# Patient Record
Sex: MALE | Race: WHITE | NOT HISPANIC OR LATINO | Employment: FULL TIME | URBAN - NONMETROPOLITAN AREA
[De-identification: names, ages, dates, MRNs, and addresses within clinical notes are randomized per-mention and may not be internally consistent; named-entity substitution may affect disease eponyms.]

---

## 2017-01-13 ENCOUNTER — TELEPHONE (OUTPATIENT)
Dept: MEDICAL GROUP | Facility: PHYSICIAN GROUP | Age: 57
End: 2017-01-13

## 2017-01-17 RX ORDER — GLIPIZIDE 5 MG/1
TABLET ORAL
Qty: 180 TAB | Refills: 3 | Status: SHIPPED | OUTPATIENT
Start: 2017-01-17 | End: 2017-11-07

## 2017-01-19 ENCOUNTER — APPOINTMENT (OUTPATIENT)
Dept: RADIOLOGY | Facility: IMAGING CENTER | Age: 57
End: 2017-01-19
Attending: ORTHOPAEDIC SURGERY
Payer: COMMERCIAL

## 2017-01-19 ENCOUNTER — NON-PROVIDER VISIT (OUTPATIENT)
Dept: URGENT CARE | Facility: PHYSICIAN GROUP | Age: 57
End: 2017-01-19
Payer: COMMERCIAL

## 2017-01-19 DIAGNOSIS — M54.5 LOW BACK PAIN, UNSPECIFIED BACK PAIN LATERALITY, UNSPECIFIED CHRONICITY, WITH SCIATICA PRESENCE UNSPECIFIED: ICD-10-CM

## 2017-01-19 DIAGNOSIS — M54.6 THORACIC BACK PAIN, UNSPECIFIED BACK PAIN LATERALITY, UNSPECIFIED CHRONICITY: ICD-10-CM

## 2017-01-19 DIAGNOSIS — M53.2X5: ICD-10-CM

## 2017-01-19 PROCEDURE — 72100 X-RAY EXAM L-S SPINE 2/3 VWS: CPT | Mod: TC | Performed by: PHYSICIAN ASSISTANT

## 2017-01-25 ENCOUNTER — HOSPITAL ENCOUNTER (OUTPATIENT)
Dept: LAB | Facility: MEDICAL CENTER | Age: 57
End: 2017-01-25
Attending: INTERNAL MEDICINE
Payer: COMMERCIAL

## 2017-01-25 DIAGNOSIS — Z12.5 SCREENING FOR PROSTATE CANCER: ICD-10-CM

## 2017-01-25 DIAGNOSIS — E66.9 DIABETES MELLITUS TYPE 2 IN OBESE (HCC): ICD-10-CM

## 2017-01-25 DIAGNOSIS — D64.9 ANEMIA OF UNKNOWN ETIOLOGY: ICD-10-CM

## 2017-01-25 DIAGNOSIS — E11.69 DIABETES MELLITUS TYPE 2 IN OBESE (HCC): ICD-10-CM

## 2017-01-25 LAB
ALBUMIN SERPL BCP-MCNC: 4 G/DL (ref 3.2–4.9)
ALBUMIN/GLOB SERPL: 1.4 G/DL
ALP SERPL-CCNC: 131 U/L (ref 30–99)
ALT SERPL-CCNC: 26 U/L (ref 2–50)
ANION GAP SERPL CALC-SCNC: 11 MMOL/L (ref 0–11.9)
AST SERPL-CCNC: 27 U/L (ref 12–45)
BASOPHILS # BLD AUTO: 0.04 K/UL (ref 0–0.12)
BASOPHILS NFR BLD AUTO: 0.6 % (ref 0–1.8)
BILIRUB SERPL-MCNC: 0.7 MG/DL (ref 0.1–1.5)
BUN SERPL-MCNC: 14 MG/DL (ref 8–22)
CALCIUM SERPL-MCNC: 9.2 MG/DL (ref 8.5–10.5)
CHLORIDE SERPL-SCNC: 102 MMOL/L (ref 96–112)
CO2 SERPL-SCNC: 25 MMOL/L (ref 20–33)
CREAT SERPL-MCNC: 1.07 MG/DL (ref 0.5–1.4)
EOSINOPHIL # BLD: 0.14 K/UL (ref 0–0.51)
EOSINOPHIL NFR BLD AUTO: 1.9 % (ref 0–6.9)
ERYTHROCYTE [DISTWIDTH] IN BLOOD BY AUTOMATED COUNT: 45.5 FL (ref 35.9–50)
EST. AVERAGE GLUCOSE BLD GHB EST-MCNC: 166 MG/DL
GLOBULIN SER CALC-MCNC: 2.9 G/DL (ref 1.9–3.5)
GLUCOSE SERPL-MCNC: 147 MG/DL (ref 65–99)
HBA1C MFR BLD: 7.4 % (ref 0–5.6)
HCT VFR BLD AUTO: 32.7 % (ref 42–52)
HGB BLD-MCNC: 10 G/DL (ref 14–18)
IMM GRANULOCYTES # BLD AUTO: 0.11 K/UL (ref 0–0.11)
IMM GRANULOCYTES NFR BLD AUTO: 1.5 % (ref 0–0.9)
LYMPHOCYTES # BLD: 1.21 K/UL (ref 1–4.8)
LYMPHOCYTES NFR BLD AUTO: 16.8 % (ref 22–41)
MCH RBC QN AUTO: 26.7 PG (ref 27–33)
MCHC RBC AUTO-ENTMCNC: 30.6 G/DL (ref 33.7–35.3)
MCV RBC AUTO: 87.2 FL (ref 81.4–97.8)
MONOCYTES # BLD: 0.42 K/UL (ref 0–0.85)
MONOCYTES NFR BLD AUTO: 5.8 % (ref 0–13.4)
NEUTROPHILS # BLD: 5.3 K/UL (ref 1.82–7.42)
NEUTROPHILS NFR BLD AUTO: 73.4 % (ref 44–72)
NRBC # BLD AUTO: 0 K/UL
NRBC BLD-RTO: 0 /100 WBC
PLATELET # BLD AUTO: 295 K/UL (ref 164–446)
PMV BLD AUTO: 9.2 FL (ref 9–12.9)
POTASSIUM SERPL-SCNC: 4.6 MMOL/L (ref 3.6–5.5)
PROT SERPL-MCNC: 6.9 G/DL (ref 6–8.2)
PSA SERPL DL<=0.01 NG/ML-MCNC: 0.77 NG/ML (ref 0–4)
RBC # BLD AUTO: 3.75 M/UL (ref 4.7–6.1)
SODIUM SERPL-SCNC: 138 MMOL/L (ref 135–145)
WBC # BLD AUTO: 7.2 K/UL (ref 4.8–10.8)

## 2017-01-25 PROCEDURE — 36415 COLL VENOUS BLD VENIPUNCTURE: CPT

## 2017-01-25 PROCEDURE — 80053 COMPREHEN METABOLIC PANEL: CPT

## 2017-01-25 PROCEDURE — 84153 ASSAY OF PSA TOTAL: CPT

## 2017-01-25 PROCEDURE — 83036 HEMOGLOBIN GLYCOSYLATED A1C: CPT

## 2017-01-25 PROCEDURE — 85025 COMPLETE CBC W/AUTO DIFF WBC: CPT

## 2017-01-31 ENCOUNTER — OFFICE VISIT (OUTPATIENT)
Dept: MEDICAL GROUP | Facility: PHYSICIAN GROUP | Age: 57
End: 2017-01-31
Payer: COMMERCIAL

## 2017-01-31 VITALS
HEIGHT: 72 IN | DIASTOLIC BLOOD PRESSURE: 84 MMHG | WEIGHT: 305 LBS | TEMPERATURE: 98.2 F | SYSTOLIC BLOOD PRESSURE: 132 MMHG | RESPIRATION RATE: 16 BRPM | BODY MASS INDEX: 41.31 KG/M2 | HEART RATE: 78 BPM | OXYGEN SATURATION: 95 %

## 2017-01-31 DIAGNOSIS — E11.69 DIABETES MELLITUS TYPE 2 IN OBESE (HCC): ICD-10-CM

## 2017-01-31 DIAGNOSIS — I10 ESSENTIAL HYPERTENSION: ICD-10-CM

## 2017-01-31 DIAGNOSIS — N40.1 BENIGN NODULAR PROSTATIC HYPERPLASIA WITH LOWER URINARY TRACT SYMPTOMS: ICD-10-CM

## 2017-01-31 DIAGNOSIS — Z79.891 CHRONIC USE OF OPIATE DRUGS THERAPEUTIC PURPOSES: ICD-10-CM

## 2017-01-31 DIAGNOSIS — D64.9 ANEMIA OF UNKNOWN ETIOLOGY: ICD-10-CM

## 2017-01-31 DIAGNOSIS — M51.37 DEGENERATION OF LUMBAR OR LUMBOSACRAL INTERVERTEBRAL DISC: ICD-10-CM

## 2017-01-31 DIAGNOSIS — M48.062 LUMBAR STENOSIS WITH NEUROGENIC CLAUDICATION: ICD-10-CM

## 2017-01-31 DIAGNOSIS — E66.9 DIABETES MELLITUS TYPE 2 IN OBESE (HCC): ICD-10-CM

## 2017-01-31 PROCEDURE — 99214 OFFICE O/P EST MOD 30 MIN: CPT | Performed by: INTERNAL MEDICINE

## 2017-01-31 RX ORDER — LANOLIN ALCOHOL/MO/W.PET/CERES
325 CREAM (GRAM) TOPICAL 2 TIMES DAILY WITH MEALS
Qty: 60 TAB | Refills: 11 | Status: SHIPPED | OUTPATIENT
Start: 2017-01-31 | End: 2017-05-18

## 2017-01-31 NOTE — MR AVS SNAPSHOT
"        Jamey Riojas   2017 1:40 PM   Office Visit   MRN: [id number]    Department:  [facility]   Dept Phone:  [phone]    Description:  Male : 1960   Provider:  Amira Vizcarra M.D.           Reason for Visit     Diabetes fv DM, labs      Allergies as of 2017     Allergen Noted Reactions    Penicillins 10/20/2009   Swelling    \"Tongue swells up\"    Phenergan [Promethazine Hcl] 10/15/2009       \"Jittery and hallucinating\"    Influenza Virus Vacc 2012       \"deathly ill\"      You were diagnosed with     Anemia of unknown etiology   [669133]       Lumbar stenosis with neurogenic claudication   [098194]       Chronic use of opiate drugs therapeutic purposes   [2152740]       Diabetes mellitus type 2 in obese (CMS-HCC)   [884413]       Essential hypertension   [9956017]       Degeneration of lumbar or lumbosacral intervertebral disc   [722.52.ICD-9-CM]   Uncontrolled, patient following with neurosurgery    Chronic use of opiate drugs therapeutic purposes   [5299860]   Uncontrolled, refill medication    Lumbar stenosis with neurogenic claudication   [776689]   Uncontrolled, patient to follow with Worker's Comp.      Vital Signs     Blood Pressure Pulse Temperature Respirations Height Weight    132/84 mmHg 78 36.8 °C (98.2 °F) 16 1.834 m (6' 0.2\") 138.347 kg (305 lb)    Body Mass Index Oxygen Saturation Smoking Status             41.13 kg/m2 95% Never Smoker          Basic Information     Date Of Birth Sex Race Ethnicity Preferred Language    1960 Male White Non- English      Problem List              ICD-10-CM Priority Class Noted - Resolved    Diabetes mellitus type 2 in obese (CMS-HCC) E11.9, E66.9   2011 - Present    HTN (hypertension) I10 Medium  2011 - Present    Insomnia G47.00   2011 - Present    GERD (gastroesophageal reflux disease) K21.9   2011 - Present    Chronic back pain M54.9, G89.29   2011 - Present    Vitamin D " deficiency disease E55.9 Low  1/12/2012 - Present    Sleep disorder breathing G47.30   8/30/2012 - Present    Chronic pain of left knee M25.562, G89.29   9/18/2012 - Present    Degeneration of cervical intervertebral disc M50.30 High  11/30/2012 - Present    Major depression F32.9 Low  12/2/2012 - Present    Obstructive sleep apnea G47.33 Medium  12/2/2012 - Present    Nausea & vomiting R11.2 High  12/2/2012 - Present    Iron deficiency anemia D50.9 Medium  12/2/2012 - Present    Degeneration of lumbar or lumbosacral intervertebral disc M51.37   12/30/2012 - Present    BPH (benign prostatic hyperplasia) N40.0   12/20/2013 - Present    Nephrolithiasis N20.0   7/30/2014 - Present    Dysuria R30.0   7/30/2014 - Present    Body aches R52   11/19/2014 - Present    Sinusitis J32.9   4/21/2015 - Present    Anemia of unknown etiology D64.9   5/21/2015 - Present    Rib pain on left side R07.81   10/22/2015 - Present    Lumbar stenosis with neurogenic claudication M48.06   12/31/2015 - Present    Chronic use of opiate drugs therapeutic purposes Z79.899   8/11/2016 - Present      Health Maintenance        Date Due Completion Dates    IMM HEP B VACCINE (1 of 3 - Primary Series) 1960 ---    RETINAL SCREENING 6/12/1980 ---    DIABETES MONOFILAMENT / LE EXAM 1/12/2013 1/12/2012 (N/S)    Override on 1/12/2012: (N/S)    IMM INFLUENZA (1) 9/1/2016 ---    COLONOSCOPY 1/1/2017 1/1/2012 (Done)    Override on 1/1/2012: Done (GI consult- polyps)    A1C SCREENING 7/25/2017 1/25/2017, 8/9/2016, 12/31/2015, 5/18/2015, 12/30/2013, 9/25/2013, 4/25/2013, 12/2/2012, 10/20/2012, 7/7/2012, 2/2/2012, 1/5/2012, 6/9/2010, 9/4/2007    FASTING LIPID PROFILE 8/9/2017 8/9/2016, 5/18/2015, 9/25/2013, 4/25/2013, 1/5/2012    URINE ACR / MICROALBUMIN 8/9/2017 8/9/2016, 5/19/2015, 2/2/2012    SERUM CREATININE 1/25/2018 1/25/2017, 8/9/2016, 12/31/2015, 12/11/2015, 5/18/2015, 12/30/2013, 9/25/2013, 4/25/2013, 1/3/2013, 1/2/2013, 12/5/2012, 12/3/2012,  12/2/2012, 11/27/2012, 10/20/2012, 9/10/2012, 7/7/2012, 4/25/2012, 4/23/2012, 2/2/2012, 1/5/2012, 11/15/2010, 6/9/2010, 5/10/2010, 10/15/2009, 9/10/2007, 9/4/2007    IMM DTaP/Tdap/Td Vaccine (2 - Td) 11/8/2025 11/8/2015            Current Immunizations     Pneumococcal polysaccharide vaccine (PPSV-23) 12/30/2005    Tdap Vaccine 11/8/2015      Below and/or attached are the medications your provider expects you to take. Review all of your home medications and newly ordered medications with your provider and/or pharmacist. Follow medication instructions as directed by your provider and/or pharmacist. Please keep your medication list with you and share with your provider. Update the information when medications are discontinued, doses are changed, or new medications (including over-the-counter products) are added; and carry medication information at all times in the event of emergency situations     Allergies:  PENICILLINS - Swelling     PHENERGAN - (reactions not documented)     INFLUENZA VIRUS VACC - (reactions not documented)               Medications  Valid as of: January 31, 2017 -  2:08 PM    Generic Name Brand Name Tablet Size Instructions for use    Albuterol Sulfate (Aero Soln) albuterol 108 (90 BASE) MCG/ACT Inhale 2 Puffs by mouth every 6 hours as needed for Shortness of Breath.        AmLODIPine Besylate (Tab) NORVASC 10 MG TAKE ONE TABLET BY MOUTH ONCE DAILY        Aspirin (Tab) aspirin 81 MG Take 81 mg by mouth every day.        Celecoxib (Cap) CELEBREX 200 MG TAKE ONE CAPSULE BY MOUTH TWICE DAILY        Cholecalciferol (Cap) Vitamin D 2000 UNITS Take  by mouth every day.        DULoxetine HCl (Cap DR Particles) CYMBALTA 30 MG Take 1 Cap by mouth every day.        Ferrous Sulfate (Tablet Delayed Response) ferrous sulfate 325 (65 FE) MG Take 1 Tab by mouth 2 times a day, with meals.        Fexofenadine HCl   Take  by mouth as needed.        Finasteride (Tab) PROSCAR 5 MG Take 1 Tab by mouth every day.           Fluticasone Propionate (Suspension) FLONASE 50 MCG/ACT Spray 2 Sprays in nose every day. Each Nostril        Gabapentin (Cap) NEURONTIN 300 MG Take 2 Caps by mouth 3 times a day.        GlipiZIDE (Tab) GLUCOTROL 5 MG TAKE ONE TABLET BY MOUTH TWICE DAILY        Glucosamine-Chondroitin-MSM   Take 2 Tabs by mouth 2 Times a Day.        HydrOXYzine HCl (Tab) ATARAX 50 MG Take 1 Tab by mouth 2 times a day as needed (insomnia).        Lansoprazole (CAPSULE DELAYED RELEASE) PREVACID 30 MG Take 1 Cap by mouth every day.        Losartan Potassium (Tab) COZAAR 100 MG TAKE ONE TABLET BY MOUTH ONCE DAILY        MetFORMIN HCl (Tab) GLUCOPHAGE 1000 MG TAKE ONE TABLET BY MOUTH TWICE DAILY WITH MEALS        Misc. Devices (Misc) Misc. Devices  One touch ultra test strips. Patient has DM type 2 and tests twice per day        Misc. Devices (Misc) Misc. Devices  CPAP supplies; fax to [facility] [phone].        Multiple Vitamins-Minerals   Take 1 Tab by mouth every day.          Nateglinide (Tab) STARLIX 120 MG TAKE ONE TABLET BY MOUTH THREE TIMES DAILY BEFORE MEAL(S)        Nystatin (Ointment) MYCOSTATIN 772630 UNIT/GM Apply to decubitus ulcer 8 times per day in a thick layer        Omeprazole (CAPSULE DELAYED RELEASE) PRILOSEC 20 MG TAKE ONE CAPSULE BY MOUTH ONCE DAILY        Oxycodone-Acetaminophen (Tab) PERCOCET-10  MG Take 1-2 Tabs by mouth every 6 hours as needed for Moderate Pain or Severe Pain (back pain).        QUEtiapine Fumarate (Tab) SEROQUEL 25 MG Take 1-2 Tabs by mouth every bedtime.        Sertraline HCl (Tab) ZOLOFT 25 MG Take 1 Tab by mouth every day.        Tamsulosin HCl (Cap) FLOMAX 0.4 MG Take 2 Caps by mouth every day.        TiZANidine HCl (Cap) ZANAFLEX 2 MG TAKE ONE CAPSULE BY MOUTH TWICE DAILY        TiZANidine HCl (Tab) ZANAFLEX 4 MG Take 1 Tab by mouth every 6 hours as needed (back pain).        TraZODone HCl (Tab) DESYREL 100 MG Take 100-200mg at night for sleep if needed.        Zolpidem Tartrate  (Tab) AMBIEN 10 MG TAKE ONE TABLET BY MOUTH AT BEDTIME AS NEEDED FOR SLEEP        .                 Medicines prescribed today were sent to:     [facility] - [place], [place] - [address]    [address] [place] [address]    Phone: [phone] Fax: [phone]    Open 24 Hours?: No    [facility] [address] [place], [place] - [address] [facility]    [address] [address]    Phone: [phone] Fax: [phone]    Open 24 Hours?: No      Medication refill instructions:       If your prescription bottle indicates you have medication refills left, it is not necessary to call your provider’s office. Please contact your pharmacy and they will refill your medication.    If your prescription bottle indicates you do not have any refills left, you may request refills at any time through one of the following ways: The online [employer] system (except Urgent Care), by calling your provider’s office, or by asking your pharmacy to contact your provider’s office with a refill request. Medication refills are processed only during regular business hours and may not be available until the next business day. Your provider may request additional information or to have a follow-up visit with you prior to refilling your medication.   *Please Note: Medication refills are assigned a new Rx number when refilled electronically. Your pharmacy may indicate that no refills were authorized even though a new prescription for the same medication is available at the pharmacy. Please request the medicine by name with the pharmacy before contacting your provider for a refill.        Instructions    1. Continue on your medications.    2. Follow up with Dr. Alcaraz in 4 weeks.       Other Notes About Your Plan     Last UDS:  5/21/15  DR ALCARAZ  Contolled Substance agreement signed: 5/21/15  DR ALCARAZ           [employer] Access Code: [email or web address]  Expires:  2/25/2017 11:39 AM    [employer]  A secure, online tool to manage your health information     [employer]® is a secure, online tool that connects you to your personalized health information from the privacy of your home -- day or night - making it very easy for you to manage your healthcare. Once the activation process is completed, you can even access your medical information using the [employer] piotr, which is available for free in the Apple Piotr store or Google Play store.     [employer] provides the following levels of access (as shown below):   My Chart Features   [facility] Doctor [facility]  Specialists [facility]  Urgent  Care Non-[facility]  Primary Care  Doctor   Email your healthcare team securely and privately 24/7 X X X    Manage appointments: schedule your next appointment; view details of past/upcoming appointments X      Request prescription refills. X      View recent personal medical records, including lab and immunizations X X X X   View health record, including health history, allergies, medications X X X X   Read reports about your outpatient visits, procedures, consult and ER notes X X X X   See your discharge summary, which is a recap of your hospital and/or ER visit that includes your diagnosis, lab results, and care plan. X X       How to register for [employer]:  1. Go to  [email or web address].  2. Click on the Sign Up Now box, which takes you to the New Member Sign Up page. You will need to provide the following information:  a. Enter your [employer] Access Code exactly as it appears at the top of this page. (You will not need to use this code after you’ve completed the sign-up process. If you do not sign up before the expiration date, you must request a new code.)   b. Enter your date of birth.   c. Enter your home email address.   d. Click Submit, and follow the next screen’s instructions.  3. Create a [employer] ID. This will be your [employer] login ID and cannot be changed, so think of one  that is secure and easy to remember.  4. Create a [employer] password. You can change your password at any time.  5. Enter your Password Reset Question and Answer. This can be used at a later time if you forget your password.   6. Enter your e-mail address. This allows you to receive e-mail notifications when new information is available in [employer].  7. Click Sign Up. You can now view your health information.    For assistance activating your [employer] account, call [phone]

## 2017-02-02 NOTE — ASSESSMENT & PLAN NOTE
Patient had anemia since his surgery last year. He has had a colonoscopy in the past. He recently had a CBC rechecked which shows persistent anemia but this is just after his recent surgery.

## 2017-02-02 NOTE — PROGRESS NOTES
Chief Complaint   Patient presents with   • Diabetes     fv DM, labs       HISTORY OF PRESENT ILLNESS: Patient is a 56 y.o. male established patient who presents today to be seen for acute and chronic issues.    Lumbar stenosis with neurogenic claudication  Patient is a 56-year-old male who's had a long history of chronic back problems. He had surgery in January 2016 on his back by Dr. Chan for chronic issues. 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 He had the onset of excruciating back pain and right knee pain. This was a Worker's Comp. issue and so it took several months for him to be able to have an MRI and see his neurosurgeon. He just underwent surgery by Dr. Chan this month and is recovering. He notes his pain is better but he does continue on narcotics. From our practice has received up to 6 tablets of Percocet per day previously. Neurosurgery has taken over his pain medications currently. He is going to see his neurosurgeon tomorrow. He is using anywhere from 6-8 Percocet per day. I've asked him to continue to follow with his neurosurgeon for pain meds for right now. I will see him back in a month and then take over his narcotics at that time I have told him he cannot write him for more than 6 tabs of Percocet per day. Patient's postoperative course was complicated by wound infection. He was treated with antibiotics and has improved.    BPH (benign prostatic hyperplasia)  At his last visit, patient describes symptoms of BPH. He was started on Flomax and had a PSA which is normal. Symptoms are much improved on the medication.    Anemia of unknown etiology  Patient had anemia since his surgery last year. He has had a colonoscopy in the past. He recently had a CBC rechecked which shows persistent anemia but this is just after his recent surgery.    Diabetes mellitus type 2 in obese  Patient has type 2 diabetes and continues on glipizide, Starlix and metformin.  Recent hemoglobin A1c is 7.4%. This is actually not terrible given the fact that the patient's undergone back surgery and had a postoperative infection. We discussed continuing his medications for now and will recheck hemoglobin A1c in 3 months. He is on losartan and statin.      Patient Active Problem List    Diagnosis Date Noted   • Nausea & vomiting 12/02/2012     Priority: High   • Degeneration of cervical intervertebral disc 11/30/2012     Priority: High   • Obstructive sleep apnea 12/02/2012     Priority: Medium   • Iron deficiency anemia 12/02/2012     Priority: Medium   • HTN (hypertension) 12/21/2011     Priority: Medium   • Major depression 12/02/2012     Priority: Low   • Vitamin D deficiency disease 01/12/2012     Priority: Low   • Chronic use of opiate drugs therapeutic purposes 08/11/2016   • Lumbar stenosis with neurogenic claudication 12/31/2015   • Rib pain on left side 10/22/2015   • Anemia of unknown etiology 05/21/2015   • Sinusitis 04/21/2015   • Body aches 11/19/2014   • Nephrolithiasis 07/30/2014   • Dysuria 07/30/2014   • BPH (benign prostatic hyperplasia) 12/20/2013   • Degeneration of lumbar or lumbosacral intervertebral disc 12/30/2012   • Chronic pain of left knee 09/18/2012   • Sleep disorder breathing 08/30/2012   • Diabetes mellitus type 2 in obese (CMS-[facility]) 12/21/2011   • Insomnia 12/21/2011   • GERD (gastroesophageal reflux disease) 12/21/2011   • Chronic back pain 12/21/2011       Allergies:Penicillins; Phenergan; and Influenza virus vacc    Current Outpatient Prescriptions Ordered in [facility]   Medication Sig Dispense Refill   • ferrous sulfate 325 (65 FE) MG EC tablet Take 1 Tab by mouth 2 times a day, with meals. 60 Tab 11   • glipiZIDE (GLUCOTROL) 5 MG Tab TAKE ONE TABLET BY MOUTH TWICE DAILY 180 Tab 3   • finasteride (PROSCAR) 5 MG Tab Take 1 Tab by mouth every day. 90 Tab 3   • oxycodone-acetaminophen (PERCOCET-10)  MG Tab Take 1-2 Tabs by mouth every 6 hours as needed for  Moderate Pain or Severe Pain (back pain). 180 Tab 0   • trazodone (DESYREL) 100 MG Tab Take 100-200mg at night for sleep if needed. 180 Tab 3   • celecoxib (CELEBREX) 200 MG Cap TAKE ONE CAPSULE BY MOUTH TWICE DAILY 180 Cap 3   • tamsulosin (FLOMAX) 0.4 MG capsule Take 2 Caps by mouth every day. 180 Cap 3   • nateglinide (STARLIX) 120 MG Tab TAKE ONE TABLET BY MOUTH THREE TIMES DAILY BEFORE MEAL(S) 270 Tab 3   • sertraline (ZOLOFT) 25 MG tablet Take 1 Tab by mouth every day. 90 Tab 3   • tizanidine (ZANAFLEX) 4 MG Tab Take 1 Tab by mouth every 6 hours as needed (back pain). 90 Tab 3   • amlodipine (NORVASC) 10 MG Tab TAKE ONE TABLET BY MOUTH ONCE DAILY 90 Tab 3   • losartan (COZAAR) 100 MG Tab TAKE ONE TABLET BY MOUTH ONCE DAILY 90 Tab 3   • metformin (GLUCOPHAGE) 1000 MG tablet TAKE ONE TABLET BY MOUTH TWICE DAILY WITH MEALS 180 Tab 3   • gabapentin (NEURONTIN) 300 MG Cap Take 2 Caps by mouth 3 times a day. 360 Cap 3   • lansoprazole (PREVACID) 30 MG CAPSULE DELAYED RELEASE Take 1 Cap by mouth every day. 30 Cap 3   • omeprazole (PRILOSEC) 20 MG delayed-release capsule TAKE ONE CAPSULE BY MOUTH ONCE DAILY 90 Cap 3   • nystatin (MYCOSTATIN) 570042 UNIT/GM Ointment Apply to decubitus ulcer 8 times per day in a thick layer 6 Tube 3   • Misc. Devices Misc CPAP supplies; fax to [facility] [phone]. 100 Device 3   • fluticasone (FLONASE) 50 MCG/ACT nasal spray Spray 2 Sprays in nose every day. Each Nostril 1 Bottle 3   • albuterol (VENTOLIN OR PROVENTIL) 108 (90 BASE) MCG/ACT AERS inhalation aerosol Inhale 2 Puffs by mouth every 6 hours as needed for Shortness of Breath. 8.5 g 3   • Misc. Devices MISC One touch ultra test strips. Patient has DM type 2 and tests twice per day 100 Each 3   • Multiple Vitamins-Minerals (MULTIVITAMIN PO) Take 1 Tab by mouth every day.       • Fexofenadine HCl (ALLEGRA PO) Take  by mouth as needed.     • Cholecalciferol (VITAMIN D) 2000 UNIT CAPS Take  by mouth every day.     •  GLUCOSAMINE-CHONDROITIN-MSM PO Take 2 Tabs by mouth 2 Times a Day.     • aspirin 81 MG tablet Take 81 mg by mouth every day.     • zolpidem (AMBIEN) 10 MG Tab TAKE ONE TABLET BY MOUTH AT BEDTIME AS NEEDED FOR SLEEP 90 Tab 0   • duloxetine (CYMBALTA) 30 MG Cap DR Particles Take 1 Cap by mouth every day. 30 Cap 3   • quetiapine (SEROQUEL) 25 MG Tab Take 1-2 Tabs by mouth every bedtime. 60 Tab 3   • hydrOXYzine (ATARAX) 50 MG Tab Take 1 Tab by mouth 2 times a day as needed (insomnia). 20 Tab 0   • tizanidine (ZANAFLEX) 2 MG capsule TAKE ONE CAPSULE BY MOUTH TWICE DAILY 180 Cap 3     No current Epic-ordered facility-administered medications on file.       Past Medical History   Diagnosis Date   • Arthritis      knees and shoulder joints and hands   • Snoring    • Diabetes      oral medication and diet   • Pain 14     lower back,neck, 5/10   • Hypertension    • Heart burn    • Insomnia 2011   • GERD (gastroesophageal reflux disease) 2011   • Psychiatric problem      depression   • Sleep apnea      CPAP, O2 @ HS 2.5 liters   • Bronchitis    • Enlarged liver    • Indigestion    • Anesthesia      mother has hard time waking up   • Nephrolithiasis 2014     This is a recurring problem. Patient states that he was evaluated for a kidney stone . CT scan showed a stone that was not obstructing at that time. He has been having increasing right-sided back pain for approximately the last month. He has not had any fever or chills.   • Dysuria 2014   • Body aches 2014   • Anxiety    • Obesity    • BRAIN (obstructive sleep apnea)        Social History   Substance Use Topics   • Smoking status: Never Smoker    • Smokeless tobacco: Never Used   • Alcohol Use: No       Family Status   Relation Status Death Age   • Mother Alive    • Father     • Maternal Grandfather       Family History   Problem Relation Age of Onset   • Other Mother      osteopenia   • Hypertension Mother    •  "Psychiatry Mother    • Other Father      brain tumor   • Heart Attack Maternal Grandfather        ROS:  Review of Systems   Constitutional: Negative for fever and malaise/fatigue.   HENT: Negative for congestion  Respiratory: Negative for cough  Cardiovascular: Negative for chest pain  Musculoskeletal Positive for back pain  All other systems reviewed and are negative except as in HPI.      Exam:  Blood pressure 132/84, pulse 78, temperature 36.8 °C (98.2 °F), resp. rate 16, height 1.834 m (6' 0.2\"), weight 138.347 kg (305 lb), SpO2 95 %.  General: Obese male in NAD  Head is grossly normal.  Neck: Supple without JVD   Pulmonary: Clear to ausculation and percussion.  Normal effort. No rales, ronchi, or wheezing.  Cardiovascular: Regular rate and rhythm without murmur. Carotid and radial pulses are intact and equal bilaterally.  Extremities: no clubbing, cyanosis, or edema.   MSK: Healing incision along the patient's back without erythema. Staples are in place.    Hospital Outpatient Visit on 01/25/2017   Component Date Value Ref Range Status   • Sodium 01/25/2017 138  135 - 145 mmol/L Final   • Potassium 01/25/2017 4.6  3.6 - 5.5 mmol/L Final   • Chloride 01/25/2017 102  96 - 112 mmol/L Final   • Co2 01/25/2017 25  20 - 33 mmol/L Final   • Anion Gap 01/25/2017 11.0  0.0 - 11.9 Final   • Glucose 01/25/2017 147* 65 - 99 mg/dL Final   • Bun 01/25/2017 14  8 - 22 mg/dL Final   • Creatinine 01/25/2017 1.07  0.50 - 1.40 mg/dL Final   • Calcium 01/25/2017 9.2  8.5 - 10.5 mg/dL Final   • AST(SGOT) 01/25/2017 27  12 - 45 U/L Final   • ALT(SGPT) 01/25/2017 26  2 - 50 U/L Final   • Alkaline Phosphatase 01/25/2017 131* 30 - 99 U/L Final   • Total Bilirubin 01/25/2017 0.7  0.1 - 1.5 mg/dL Final   • Albumin 01/25/2017 4.0  3.2 - 4.9 g/dL Final   • Total Protein 01/25/2017 6.9  6.0 - 8.2 g/dL Final   • Globulin 01/25/2017 2.9  1.9 - 3.5 g/dL Final   • A-G Ratio 01/25/2017 1.4   Final   • Glycohemoglobin 01/25/2017 7.4* 0.0 - 5.6 % " Final    Comment: Increased risk for diabetes:  5.7 -6.4%  Diabetes:  >6.4%  Glycemic control for adults with diabetes:  <7.0%  The above interpretations are per ADA guidelines.  Diagnosis  of diabetes mellitus on the basis of elevated Hemoglobin A1c  should be confirmed by repeating the Hb A1c test.     • Est Avg Glucose 01/25/2017 166   Final    Comment: The eAG calculation is based on the A1c-Derived Daily Glucose  (ADAG) study.  See the ADA's website for additional information.     • WBC 01/25/2017 7.2  4.8 - 10.8 K/uL Final   • RBC 01/25/2017 3.75* 4.70 - 6.10 M/uL Final   • Hemoglobin 01/25/2017 10.0* 14.0 - 18.0 g/dL Final   • Hematocrit 01/25/2017 32.7* 42.0 - 52.0 % Final   • MCV 01/25/2017 87.2  81.4 - 97.8 fL Final   • MCH 01/25/2017 26.7* 27.0 - 33.0 pg Final   • MCHC 01/25/2017 30.6* 33.7 - 35.3 g/dL Final   • RDW 01/25/2017 45.5  35.9 - 50.0 fL Final   • Platelet Count 01/25/2017 295  164 - 446 K/uL Final   • MPV 01/25/2017 9.2  9.0 - 12.9 fL Final   • Neutrophils-Polys 01/25/2017 73.40* 44.00 - 72.00 % Final   • Lymphocytes 01/25/2017 16.80* 22.00 - 41.00 % Final   • Monocytes 01/25/2017 5.80  0.00 - 13.40 % Final   • Eosinophils 01/25/2017 1.90  0.00 - 6.90 % Final   • Basophils 01/25/2017 0.60  0.00 - 1.80 % Final   • Immature Granulocytes 01/25/2017 1.50* 0.00 - 0.90 % Final   • Nucleated RBC 01/25/2017 0.00   Final   • Neutrophils (Absolute) 01/25/2017 5.30  1.82 - 7.42 K/uL Final    Includes immature neutrophils, if present.   • Lymphs (Absolute) 01/25/2017 1.21  1.00 - 4.80 K/uL Final   • Monos (Absolute) 01/25/2017 0.42  0.00 - 0.85 K/uL Final   • Eos (Absolute) 01/25/2017 0.14  0.00 - 0.51 K/uL Final   • Baso (Absolute) 01/25/2017 0.04  0.00 - 0.12 K/uL Final   • Immature Granulocytes (abs) 01/25/2017 0.11  0.00 - 0.11 K/uL Final   • NRBC (Absolute) 01/25/2017 0.00   Final   • Prostatic Specific Antigen Tot 01/25/2017 0.77  0.00 - 4.00 ng/mL Final    Comment: The Access [employer] PSA assay is  a paramagnetic particle,  chemiluminescent immunoassay for the quantitative determination  of total prostate specific antigen (PSA) levels using the  Access Immunoassay System. Values obtained with different  methods cannot be used interchangeably for patient monitoring.     • GFR If  01/25/2017 >60  >60 mL/min/1.73 m 2 Final   • GFR If Non  01/25/2017 >60  >60 mL/min/1.73 m 2 Final           Assessment/Plan:  1. Anemia of unknown etiology  ferrous sulfate 325 (65 FE) MG EC tablet    Control, blood loss from surgery   2. Lumbar stenosis with neurogenic claudication      Uncontrolled, following with neurosurgery   3. Chronic use of opiate drugs therapeutic purposes     4. Diabetes mellitus type 2 in obese (CMS-HCC)      Uncontrolled, mildly elevated but given recent surgery, will monitor   5. Essential hypertension      Control, on medications   6. Degeneration of lumbar or lumbosacral intervertebral disc      Uncontrolled, patient following with neurosurgery   7. Chronic use of opiate drugs therapeutic purposes      Uncontrolled, refill medication   8. Lumbar stenosis with neurogenic claudication      Uncontrolled, patient to follow with Worker's Comp.   9. Benign nodular prostatic hyperplasia with lower urinary tract symptoms      Controlled, improved on Flomax     Please note that this dictation was created using voice recognition software. I have made every reasonable attempt to correct obvious errors, but I expect that there are errors of grammar and possibly content that I did not discover before finalizing the note.

## 2017-02-02 NOTE — ASSESSMENT & PLAN NOTE
At his last visit, patient describes symptoms of BPH. He was started on Flomax and had a PSA which is normal. Symptoms are much improved on the medication.

## 2017-02-02 NOTE — ASSESSMENT & PLAN NOTE
Patient has type 2 diabetes and continues on glipizide, Starlix and metformin. Recent hemoglobin A1c is 7.4%. This is actually not terrible given the fact that the patient's undergone back surgery and had a postoperative infection. We discussed continuing his medications for now and will recheck hemoglobin A1c in 3 months. He is on losartan and statin.

## 2017-02-20 ENCOUNTER — NON-PROVIDER VISIT (OUTPATIENT)
Dept: URGENT CARE | Facility: PHYSICIAN GROUP | Age: 57
End: 2017-02-20
Payer: COMMERCIAL

## 2017-02-20 ENCOUNTER — APPOINTMENT (OUTPATIENT)
Dept: RADIOLOGY | Facility: IMAGING CENTER | Age: 57
End: 2017-02-20
Attending: ORTHOPAEDIC SURGERY
Payer: COMMERCIAL

## 2017-02-20 DIAGNOSIS — M54.6 THORACIC BACK PAIN, UNSPECIFIED BACK PAIN LATERALITY, UNSPECIFIED CHRONICITY: ICD-10-CM

## 2017-02-20 DIAGNOSIS — M54.5 LOW BACK PAIN, UNSPECIFIED BACK PAIN LATERALITY, UNSPECIFIED CHRONICITY, WITH SCIATICA PRESENCE UNSPECIFIED: ICD-10-CM

## 2017-02-20 PROCEDURE — 72100 X-RAY EXAM L-S SPINE 2/3 VWS: CPT | Mod: TC | Performed by: PHYSICIAN ASSISTANT

## 2017-02-20 PROCEDURE — 72070 X-RAY EXAM THORAC SPINE 2VWS: CPT | Mod: TC | Performed by: PHYSICIAN ASSISTANT

## 2017-02-27 ENCOUNTER — OFFICE VISIT (OUTPATIENT)
Dept: MEDICAL GROUP | Facility: PHYSICIAN GROUP | Age: 57
End: 2017-02-27
Payer: COMMERCIAL

## 2017-02-27 VITALS
HEIGHT: 72 IN | WEIGHT: 315 LBS | RESPIRATION RATE: 16 BRPM | OXYGEN SATURATION: 97 % | HEART RATE: 76 BPM | SYSTOLIC BLOOD PRESSURE: 124 MMHG | BODY MASS INDEX: 42.66 KG/M2 | TEMPERATURE: 98.1 F | DIASTOLIC BLOOD PRESSURE: 72 MMHG

## 2017-02-27 DIAGNOSIS — E66.9 DIABETES MELLITUS TYPE 2 IN OBESE (HCC): ICD-10-CM

## 2017-02-27 DIAGNOSIS — G89.29 CHRONIC BILATERAL LOW BACK PAIN WITHOUT SCIATICA: ICD-10-CM

## 2017-02-27 DIAGNOSIS — G47.33 OBSTRUCTIVE SLEEP APNEA: ICD-10-CM

## 2017-02-27 DIAGNOSIS — M48.062 LUMBAR STENOSIS WITH NEUROGENIC CLAUDICATION: ICD-10-CM

## 2017-02-27 DIAGNOSIS — M51.37 DEGENERATION OF LUMBAR OR LUMBOSACRAL INTERVERTEBRAL DISC: ICD-10-CM

## 2017-02-27 DIAGNOSIS — M54.50 CHRONIC BILATERAL LOW BACK PAIN WITHOUT SCIATICA: ICD-10-CM

## 2017-02-27 DIAGNOSIS — E11.8 TYPE 2 DIABETES MELLITUS WITH COMPLICATION, WITHOUT LONG-TERM CURRENT USE OF INSULIN (HCC): ICD-10-CM

## 2017-02-27 DIAGNOSIS — E11.69 DIABETES MELLITUS TYPE 2 IN OBESE (HCC): ICD-10-CM

## 2017-02-27 DIAGNOSIS — Z79.891 CHRONIC USE OF OPIATE DRUGS THERAPEUTIC PURPOSES: ICD-10-CM

## 2017-02-27 DIAGNOSIS — M48.061 LUMBAR STENOSIS: ICD-10-CM

## 2017-02-27 DIAGNOSIS — F33.1 MODERATE EPISODE OF RECURRENT MAJOR DEPRESSIVE DISORDER (HCC): ICD-10-CM

## 2017-02-27 DIAGNOSIS — F32.A DEPRESSIVE DISORDER: ICD-10-CM

## 2017-02-27 PROCEDURE — 99214 OFFICE O/P EST MOD 30 MIN: CPT | Performed by: INTERNAL MEDICINE

## 2017-02-27 RX ORDER — OXYCODONE AND ACETAMINOPHEN 10; 325 MG/1; MG/1
1-2 TABLET ORAL EVERY 6 HOURS PRN
Qty: 180 TAB | Refills: 0 | Status: SHIPPED | OUTPATIENT
Start: 2017-02-27 | End: 2017-02-27 | Stop reason: SDUPTHER

## 2017-02-27 RX ORDER — DIAZEPAM 5 MG/1
5 TABLET ORAL EVERY 6 HOURS PRN
COMMUNITY
End: 2017-05-18

## 2017-02-27 RX ORDER — TIZANIDINE 4 MG/1
4 TABLET ORAL EVERY 6 HOURS PRN
Qty: 270 TAB | Refills: 3 | Status: SHIPPED | OUTPATIENT
Start: 2017-02-27

## 2017-02-27 RX ORDER — OXYCODONE AND ACETAMINOPHEN 10; 325 MG/1; MG/1
1-2 TABLET ORAL EVERY 6 HOURS PRN
Qty: 180 TAB | Refills: 0 | Status: SHIPPED | OUTPATIENT
Start: 2017-02-27 | End: 2017-05-18 | Stop reason: SDUPTHER

## 2017-02-27 ASSESSMENT — ENCOUNTER SYMPTOMS: DEPRESSION: 1

## 2017-02-27 ASSESSMENT — LIFESTYLE VARIABLES: HISTORY_ALCOHOL_USE: 0

## 2017-02-27 NOTE — PATIENT INSTRUCTIONS
1. Increase sertraline to 50mg a day.    2. Dr. Vzicarra is taking your pain meds back over.    3. Follow up in early May with Diabetes labs

## 2017-02-27 NOTE — MR AVS SNAPSHOT
"        Jamey Riojas   2017 2:40 PM   Office Visit   MRN: [id number]    Department:  [facility]   Dept Phone:  [phone]    Description:  Male : 1960   Provider:  Amira Vizcarra M.D.           Reason for Visit     Pain fv back pain      Allergies as of 2017     Allergen Noted Reactions    Penicillins 10/20/2009   Swelling    \"Tongue swells up\"    Phenergan [Promethazine Hcl] 10/15/2009       \"Jittery and hallucinating\"    Influenza Virus Vacc 2012       \"deathly ill\"      You were diagnosed with     Lumbar stenosis with neurogenic claudication   [570144]       Chronic use of opiate drugs therapeutic purposes   [5446852]       Moderate episode of recurrent major depressive disorder (CMS-HCC)   [2701194]       Depressive disorder   [042405]       Diabetes mellitus type 2 in obese (CMS-HCC)   [790608]       Obstructive sleep apnea   [910360]       Type 2 diabetes mellitus with complication, without long-term current use of insulin (CMS-HCC)   [2338882]       Degeneration of lumbar or lumbosacral intervertebral disc   [722.52.ICD-9-CM]   Uncontrolled, patient following with neurosurgery    Lumbar stenosis with neurogenic claudication   [643751]   Uncontrolled, patient following with neurosurgery    Chronic bilateral low back pain without sciatica   [6025136]       Lumbar stenosis   [060752]       Chronic use of opiate drugs therapeutic purposes   [7312188]   Uncontrolled, refill medication    Lumbar stenosis with neurogenic claudication   [943812]   Uncontrolled, patient to follow with Worker's Comp.      Vital Signs     Blood Pressure Pulse Temperature Respirations Height Weight    124/72 mmHg 76 36.7 °C (98.1 °F) 16 1.829 m (6' 0.01\") 146.512 kg (323 lb)    Body Mass Index Oxygen Saturation Smoking Status             43.80 kg/m2 97% Never Smoker          Basic Information     Date Of Birth Sex Race Ethnicity Preferred Language    1960 Male White Non- English   "   Problem List              ICD-10-CM Priority Class Noted - Resolved    Diabetes mellitus type 2 in obese (CMS-HCC) E11.9, E66.9   12/21/2011 - Present    HTN (hypertension) I10 Medium  12/21/2011 - Present    Insomnia G47.00   12/21/2011 - Present    GERD (gastroesophageal reflux disease) K21.9   12/21/2011 - Present    Chronic back pain M54.9, G89.29   12/21/2011 - Present    Vitamin D deficiency disease E55.9 Low  1/12/2012 - Present    Sleep disorder breathing G47.30   8/30/2012 - Present    Chronic pain of left knee M25.562, G89.29   9/18/2012 - Present    Degeneration of cervical intervertebral disc M50.30 High  11/30/2012 - Present    Major depression F32.9 Low  12/2/2012 - Present    Obstructive sleep apnea G47.33 Medium  12/2/2012 - Present    Nausea & vomiting R11.2 High  12/2/2012 - Present    Iron deficiency anemia D50.9 Medium  12/2/2012 - Present    Degeneration of lumbar or lumbosacral intervertebral disc M51.37   12/30/2012 - Present    BPH (benign prostatic hyperplasia) N40.0   12/20/2013 - Present    Nephrolithiasis N20.0   7/30/2014 - Present    Dysuria R30.0   7/30/2014 - Present    Body aches R52   11/19/2014 - Present    Sinusitis J32.9   4/21/2015 - Present    Anemia of unknown etiology D64.9   5/21/2015 - Present    Rib pain on left side R07.81   10/22/2015 - Present    Lumbar stenosis with neurogenic claudication M48.06   12/31/2015 - Present    Chronic use of opiate drugs therapeutic purposes Z79.899   8/11/2016 - Present      Health Maintenance        Date Due Completion Dates    IMM HEP B VACCINE (1 of 3 - Primary Series) 1960 ---    RETINAL SCREENING 6/12/1980 ---    DIABETES MONOFILAMENT / LE EXAM 1/12/2013 1/12/2012 (N/S)    Override on 1/12/2012: (N/S)    IMM INFLUENZA (1) 9/1/2016 ---    COLONOSCOPY 1/1/2017 1/1/2012 (Done)    Override on 1/1/2012: Done (GI consult- polyps)    A1C SCREENING 7/25/2017 1/25/2017, 8/9/2016, 12/31/2015, 5/18/2015, 12/30/2013, 9/25/2013, 4/25/2013,  12/2/2012, 10/20/2012, 7/7/2012, 2/2/2012, 1/5/2012, 6/9/2010, 9/4/2007    FASTING LIPID PROFILE 8/9/2017 8/9/2016, 5/18/2015, 9/25/2013, 4/25/2013, 1/5/2012    URINE ACR / MICROALBUMIN 8/9/2017 8/9/2016, 5/19/2015, 2/2/2012    SERUM CREATININE 1/25/2018 1/25/2017, 8/9/2016, 12/31/2015, 12/11/2015, 5/18/2015, 12/30/2013, 9/25/2013, 4/25/2013, 1/3/2013, 1/2/2013, 12/5/2012, 12/3/2012, 12/2/2012, 11/27/2012, 10/20/2012, 9/10/2012, 7/7/2012, 4/25/2012, 4/23/2012, 2/2/2012, 1/5/2012, 11/15/2010, 6/9/2010, 5/10/2010, 10/15/2009, 9/10/2007, 9/4/2007    IMM DTaP/Tdap/Td Vaccine (2 - Td) 11/8/2025 11/8/2015            Current Immunizations     Pneumococcal polysaccharide vaccine (PPSV-23) 12/30/2005    Tdap Vaccine 11/8/2015      Below and/or attached are the medications your provider expects you to take. Review all of your home medications and newly ordered medications with your provider and/or pharmacist. Follow medication instructions as directed by your provider and/or pharmacist. Please keep your medication list with you and share with your provider. Update the information when medications are discontinued, doses are changed, or new medications (including over-the-counter products) are added; and carry medication information at all times in the event of emergency situations     Allergies:  PENICILLINS - Swelling     PHENERGAN - (reactions not documented)     INFLUENZA VIRUS VACC - (reactions not documented)               Medications  Valid as of: February 27, 2017 -  3:07 PM    Generic Name Brand Name Tablet Size Instructions for use    Albuterol Sulfate (Aero Soln) albuterol 108 (90 BASE) MCG/ACT Inhale 2 Puffs by mouth every 6 hours as needed for Shortness of Breath.        AmLODIPine Besylate (Tab) NORVASC 10 MG TAKE ONE TABLET BY MOUTH ONCE DAILY        Aspirin (Tab) aspirin 81 MG Take 81 mg by mouth every day.        Celecoxib (Cap) CELEBREX 200 MG TAKE ONE CAPSULE BY MOUTH TWICE DAILY        Cholecalciferol (Cap)  Vitamin D 2000 UNITS Take  by mouth every day.        DiazePAM (Tab) VALIUM 5 MG Take 5 mg by mouth every 6 hours as needed for Anxiety.        DULoxetine HCl (Cap DR Particles) CYMBALTA 30 MG Take 1 Cap by mouth every day.        Ferrous Sulfate (Tablet Delayed Response) ferrous sulfate 325 (65 FE) MG Take 1 Tab by mouth 2 times a day, with meals.        Fexofenadine HCl   Take  by mouth as needed.        Finasteride (Tab) PROSCAR 5 MG Take 1 Tab by mouth every day.        Fluticasone Propionate (Suspension) FLONASE 50 MCG/ACT Spray 2 Sprays in nose every day. Each Nostril        Gabapentin (Cap) NEURONTIN 300 MG Take 2 Caps by mouth 3 times a day.        GlipiZIDE (Tab) GLUCOTROL 5 MG TAKE ONE TABLET BY MOUTH TWICE DAILY        Glucosamine-Chondroitin-MSM   Take 2 Tabs by mouth 2 Times a Day.        HydrOXYzine HCl (Tab) ATARAX 50 MG Take 1 Tab by mouth 2 times a day as needed (insomnia).        Lansoprazole (CAPSULE DELAYED RELEASE) PREVACID 30 MG Take 1 Cap by mouth every day.        Losartan Potassium (Tab) COZAAR 100 MG TAKE ONE TABLET BY MOUTH ONCE DAILY        MetFORMIN HCl (Tab) GLUCOPHAGE 1000 MG TAKE ONE TABLET BY MOUTH TWICE DAILY WITH MEALS        Misc. Devices (Misc) Misc. Devices  CPAP supplies; fax to [facility] [phone].        Misc. Devices (Misc) Misc. Devices  One touch ultra test strips. Patient has DM type 2 and tests twice per day        Misc. Devices (Misc) Misc. Devices  One touch ultra lancets; patient has diabetes mellitus type 2 and test once per day        Multiple Vitamins-Minerals   Take 1 Tab by mouth every day.          Nateglinide (Tab) STARLIX 120 MG TAKE ONE TABLET BY MOUTH THREE TIMES DAILY BEFORE MEAL(S)        Nystatin (Ointment) MYCOSTATIN 412903 UNIT/GM Apply to decubitus ulcer 8 times per day in a thick layer        Omeprazole (CAPSULE DELAYED RELEASE) PRILOSEC 20 MG TAKE ONE CAPSULE BY MOUTH ONCE DAILY        Oxycodone-Acetaminophen (Tab) PERCOCET-10  MG Take 1-2 Tabs  by mouth every 6 hours as needed for Moderate Pain or Severe Pain (back pain).        Sertraline HCl (Tab) ZOLOFT 50 MG Take 1 Tab by mouth every day.        Tamsulosin HCl (Cap) FLOMAX 0.4 MG Take 2 Caps by mouth every day.        TiZANidine HCl (Cap) ZANAFLEX 2 MG TAKE ONE CAPSULE BY MOUTH TWICE DAILY        TiZANidine HCl (Tab) ZANAFLEX 4 MG Take 1 Tab by mouth every 6 hours as needed (back pain).        TraZODone HCl (Tab) DESYREL 100 MG Take 100-200mg at night for sleep if needed.        Zolpidem Tartrate (Tab) AMBIEN 10 MG TAKE ONE TABLET BY MOUTH AT BEDTIME AS NEEDED FOR SLEEP        .                 Medicines prescribed today were sent to:     [facility] [address] [place] [address] [address] [facility]    [address] [place] [address]    Phone: [phone] Fax: [phone]    Open 24 Hours?: No    [facility] MAIL ORDER [address] [place] [address] MAIL SERVICES    [address] [place] [address]    Phone: [phone] Fax: [phone]    Open 24 Hours?: No      Medication refill instructions:       If your prescription bottle indicates you have medication refills left, it is not necessary to call your provider’s office. Please contact your pharmacy and they will refill your medication.    If your prescription bottle indicates you do not have any refills left, you may request refills at any time through one of the following ways: The online [employer] system (except Urgent Care), by calling your provider’s office, or by asking your pharmacy to contact your provider’s office with a refill request. Medication refills are processed only during regular business hours and may not be available until the next business day. Your provider may request additional information or to have a follow-up visit with you prior to refilling your medication.   *Please Note: Medication refills are assigned a new Rx number when refilled electronically. Your pharmacy  may indicate that no refills were authorized even though a new prescription for the same medication is available at the pharmacy. Please request the medicine by name with the pharmacy before contacting your provider for a refill.        Your To Do List     Future Labs/Procedures Complete By Expires    COMP METABOLIC PANEL  As directed 2/27/2018    HEMOGLOBIN A1C  As directed 2/27/2018      Instructions    1. Increase sertraline to 50mg a day.    2. Dr. Alcaraz is taking your pain meds back over.    3. Follow up in early May with Diabetes labs       Other Notes About Your Plan     Last UDS:  5/21/15  DR ALCARAZ  Contolled Substance agreement signed: 5/21/15  DR ALCARAZ           [facility] Access Code: [email or web address]  Expires: 3/28/2017 10:26 AM    [facility]  A secure, online tool to manage your health information     [employer]’s [facility]® is a secure, online tool that connects you to your personalized health information from the privacy of your home -- day or night - making it very easy for you to manage your healthcare. Once the activation process is completed, you can even access your medical information using the [facility] piotr, which is available for free in the [employer] Piotr store or Google Play store.     [facility] provides the following levels of access (as shown below):   My Chart Features   [facility] Doctor [facility]  Specialists [facility]  Urgent  Care Non-[facility]  Primary Care  Doctor   Email your healthcare team securely and privately 24/7 X X X    Manage appointments: schedule your next appointment; view details of past/upcoming appointments X      Request prescription refills. X      View recent personal medical records, including lab and immunizations X X X X   View health record, including health history, allergies, medications X X X X   Read reports about your outpatient visits, procedures, consult and ER notes X X X X   See your discharge summary, which is a recap of your hospital and/or ER visit that includes  your diagnosis, lab results, and care plan. X X       How to register for [facility]:  1. Go to  [email or web address].  2. Click on the Sign Up Now box, which takes you to the New Member Sign Up page. You will need to provide the following information:  a. Enter your [facility] Access Code exactly as it appears at the top of this page. (You will not need to use this code after you’ve completed the sign-up process. If you do not sign up before the expiration date, you must request a new code.)   b. Enter your date of birth.   c. Enter your home email address.   d. Click Submit, and follow the next screen’s instructions.  3. Create a [facility] ID. This will be your [facility] login ID and cannot be changed, so think of one that is secure and easy to remember.  4. Create a [facility] password. You can change your password at any time.  5. Enter your Password Reset Question and Answer. This can be used at a later time if you forget your password.   6. Enter your e-mail address. This allows you to receive e-mail notifications when new information is available in [facility].  7. Click Sign Up. You can now view your health information.    For assistance activating your [facility] account, call [phone]

## 2017-02-28 NOTE — ASSESSMENT & PLAN NOTE
Patient has major depressive disorder and has been on sertraline 25 mg a day for several years. Recently has had worsening of mood in the setting of this frustration of trying to get his back and his knee assessed Worker's Compensation. He's not had any suicidal thoughts. We discussed increasing his Zoloft to 50 mg a day.

## 2017-02-28 NOTE — PROGRESS NOTES
Chief Complaint   Patient presents with   • Pain     fv back pain       HISTORY OF PRESENT ILLNESS: Patient is a 56 y.o. male established patient who presents today to be seen for acute and chronic issues.    Lumbar stenosis with neurogenic claudication  Patient is a 56-year-old male who comes in for follow-up. I last saw him about one month ago. Patient has a long history of chronic back problems and had surgery in January 2016. He was recovering from that surgery returned to work in the spring of 2016 when he had an injury at work. He had acute new back pain and worsening symptoms for several months. It is a long time for Worker's Compensation to address his symptoms and get him in with his surgeon. It was found that his hardware had become displaced from his most recent surgery. Patient has continued on a pain contract with our practice which he has been on for several years. He recently underwent back surgery by his surgeon and has been on narcotics from his surgeon. He will be transitioning back to us for his pain medications.    Chronic use of opiate drugs therapeutic purposes  He has not been on benzodiazepines but is currently on Ambien. We discussed that there is been no clear guidelines on use of narcotics and Ambien and while I'm okay with it for now, that may change in the future or with another provider. Patient most recently received #90 tabs of oxycodone acetaminophen 10/325mg from his surgeon on February 20. He is taking up to 8 tabs a day. I had clearly told him his last visit that I cannot write for this amount and he is willing to reduce to 6 tablets a day. He will be due for refill on March 3. I am prescribing his refill for #180 tabs of this medication to last one month dated March 3. He notes that his surgeon also gave him Valium to help with muscle spasm for sleep at night. I told him he must discontinue this medication and he tells me he will stop it. Patient received prescriptions for the  next 3 months.      I consulted the  report as of today and no concerns were noted.  Consequences of Chronic Opiate therapy:  (5 A's)  Analgesia:  not changed  Activity:  not changed  Adverse Events:  none  Aberrant Behaviors:  none  Affect/Mood: good grooming, full facial expressions  Last CMP:   This year  Appropriate Imaging done:   yes    Diabetes mellitus type 2 in obese  Patient has type 2 diabetes and does continue on metformin, glipizide, Starlix. He is on statin and losartan. Hemoglobin A1c was recent 7.4% which is not terrible given the fact that he's been in a lot of pain and had surgery. He is continuing to work on diet and will recheck in 3 months.    Major depression  Patient has major depressive disorder and has been on sertraline 25 mg a day for several years. Recently has had worsening of mood in the setting of this frustration of trying to get his back and his knee assessed Worker's Compensation. He's not had any suicidal thoughts. We discussed increasing his Zoloft to 50 mg a day.      Patient Active Problem List    Diagnosis Date Noted   • Nausea & vomiting 12/02/2012     Priority: High   • Degeneration of cervical intervertebral disc 11/30/2012     Priority: High   • Obstructive sleep apnea 12/02/2012     Priority: Medium   • Iron deficiency anemia 12/02/2012     Priority: Medium   • HTN (hypertension) 12/21/2011     Priority: Medium   • Major depression 12/02/2012     Priority: Low   • Vitamin D deficiency disease 01/12/2012     Priority: Low   • Chronic use of opiate drugs therapeutic purposes 08/11/2016   • Lumbar stenosis with neurogenic claudication 12/31/2015   • Rib pain on left side 10/22/2015   • Anemia of unknown etiology 05/21/2015   • Sinusitis 04/21/2015   • Body aches 11/19/2014   • Nephrolithiasis 07/30/2014   • Dysuria 07/30/2014   • BPH (benign prostatic hyperplasia) 12/20/2013   • Degeneration of lumbar or lumbosacral intervertebral disc 12/30/2012   • Chronic pain of left  knee 09/18/2012   • Sleep disorder breathing 08/30/2012   • Diabetes mellitus type 2 in obese (CMS-[facility]) 12/21/2011   • Insomnia 12/21/2011   • GERD (gastroesophageal reflux disease) 12/21/2011   • Chronic back pain 12/21/2011       Allergies:Penicillins; Phenergan; and Influenza virus vacc    Current Outpatient Prescriptions Ordered in [facility]   Medication Sig Dispense Refill   • diazepam (VALIUM) 5 MG Tab Take 5 mg by mouth every 6 hours as needed for Anxiety.     • sertraline (ZOLOFT) 50 MG Tab Take 1 Tab by mouth every day. 90 Tab 3   • Misc. Devices Misc One touch ultra test strips. Patient has DM type 2 and tests twice per day 100 Each 3   • Misc. Devices Misc One touch ultra lancets; patient has diabetes mellitus type 2 and test once per day 100 Device 3   • tizanidine (ZANAFLEX) 4 MG Tab Take 1 Tab by mouth every 6 hours as needed (back pain). 270 Tab 3   • oxycodone-acetaminophen (PERCOCET-10)  MG Tab Take 1-2 Tabs by mouth every 6 hours as needed for Moderate Pain or Severe Pain (back pain). 180 Tab 0   • ferrous sulfate 325 (65 FE) MG EC tablet Take 1 Tab by mouth 2 times a day, with meals. 60 Tab 11   • glipiZIDE (GLUCOTROL) 5 MG Tab TAKE ONE TABLET BY MOUTH TWICE DAILY 180 Tab 3   • finasteride (PROSCAR) 5 MG Tab Take 1 Tab by mouth every day. 90 Tab 3   • trazodone (DESYREL) 100 MG Tab Take 100-200mg at night for sleep if needed. 180 Tab 3   • celecoxib (CELEBREX) 200 MG Cap TAKE ONE CAPSULE BY MOUTH TWICE DAILY 180 Cap 3   • tamsulosin (FLOMAX) 0.4 MG capsule Take 2 Caps by mouth every day. 180 Cap 3   • nateglinide (STARLIX) 120 MG Tab TAKE ONE TABLET BY MOUTH THREE TIMES DAILY BEFORE MEAL(S) 270 Tab 3   • zolpidem (AMBIEN) 10 MG Tab TAKE ONE TABLET BY MOUTH AT BEDTIME AS NEEDED FOR SLEEP 90 Tab 0   • losartan (COZAAR) 100 MG Tab TAKE ONE TABLET BY MOUTH ONCE DAILY 90 Tab 3   • metformin (GLUCOPHAGE) 1000 MG tablet TAKE ONE TABLET BY MOUTH TWICE DAILY WITH MEALS 180 Tab 3   • gabapentin (NEURONTIN)  300 MG Cap Take 2 Caps by mouth 3 times a day. 360 Cap 3   • lansoprazole (PREVACID) 30 MG CAPSULE DELAYED RELEASE Take 1 Cap by mouth every day. 30 Cap 3   • omeprazole (PRILOSEC) 20 MG delayed-release capsule TAKE ONE CAPSULE BY MOUTH ONCE DAILY 90 Cap 3   • nystatin (MYCOSTATIN) 522938 UNIT/GM Ointment Apply to decubitus ulcer 8 times per day in a thick layer 6 Tube 3   • Misc. Devices Misc CPAP supplies; fax to [facility] [phone]. 100 Device 3   • Multiple Vitamins-Minerals (MULTIVITAMIN PO) Take 1 Tab by mouth every day.       • Fexofenadine HCl (ALLEGRA PO) Take  by mouth as needed.     • Cholecalciferol (VITAMIN D) 2000 UNIT CAPS Take  by mouth every day.     • GLUCOSAMINE-CHONDROITIN-MSM PO Take 2 Tabs by mouth 2 Times a Day.     • duloxetine (CYMBALTA) 30 MG Cap DR Particles Take 1 Cap by mouth every day. 30 Cap 3   • hydrOXYzine (ATARAX) 50 MG Tab Take 1 Tab by mouth 2 times a day as needed (insomnia). 20 Tab 0   • amlodipine (NORVASC) 10 MG Tab TAKE ONE TABLET BY MOUTH ONCE DAILY 90 Tab 3   • tizanidine (ZANAFLEX) 2 MG capsule TAKE ONE CAPSULE BY MOUTH TWICE DAILY 180 Cap 3   • fluticasone (FLONASE) 50 MCG/ACT nasal spray Spray 2 Sprays in nose every day. Each Nostril 1 Bottle 3   • albuterol (VENTOLIN OR PROVENTIL) 108 (90 BASE) MCG/ACT AERS inhalation aerosol Inhale 2 Puffs by mouth every 6 hours as needed for Shortness of Breath. 8.5 g 3   • aspirin 81 MG tablet Take 81 mg by mouth every day.       No current [facility]-ordered facility-administered medications on file.       Past Medical History   Diagnosis Date   • Arthritis      knees and shoulder joints and hands   • Snoring    • Diabetes      oral medication and diet   • Pain 12-14-12     lower back,neck, 5/10   • Hypertension    • Heart burn    • Insomnia 12/21/2011   • GERD (gastroesophageal reflux disease) 12/21/2011   • Psychiatric problem      depression   • Sleep apnea      CPAP, O2 @ HS 2.5 liters   • Bronchitis 2009   • Enlarged liver    •  "Indigestion    • Anesthesia      mother has hard time waking up   • Nephrolithiasis 2014     This is a recurring problem. Patient states that he was evaluated for a kidney stone . CT scan showed a stone that was not obstructing at that time. He has been having increasing right-sided back pain for approximately the last month. He has not had any fever or chills.   • Dysuria 2014   • Body aches 2014   • Anxiety    • Obesity    • BRAIN (obstructive sleep apnea)        Social History   Substance Use Topics   • Smoking status: Never Smoker    • Smokeless tobacco: Never Used   • Alcohol Use: No       Family Status   Relation Status Death Age   • Mother Alive    • Father     • Maternal Grandfather       Family History   Problem Relation Age of Onset   • Other Mother      osteopenia   • Hypertension Mother    • Psychiatry Mother    • Other Father      brain tumor   • Heart Attack Maternal Grandfather        ROS:  Review of Systems   Constitutional: Negative for fever and malaise/fatigue.   HENT: Negative for congestion  Respiratory: Negative for cough  Cardiovascular: Negative for chest pain  Gastrointestinal: Negative for nausea, vomiting and abdominal pain.  Musculoskeletal:positive for back pain, left knee pain  Psych: positive for depression, denies suicidal ideation  All other systems reviewed and are negative except as in HPI.      Exam:  Blood pressure 124/72, pulse 76, temperature 36.7 °C (98.1 °F), resp. rate 16, height 1.829 m (6' 0.01\"), weight 146.512 kg (323 lb), SpO2 97 %.  General: obese male in NAD  Head is grossly normal.  Neck: Supple without JVD   Pulmonary: Clear to ausculation and percussion.  Normal effort. No rales, ronchi, or wheezing.  Cardiovascular: Regular rate and rhythm without murmur. Carotid and radial pulses are intact and equal bilaterally.  Extremities: no clubbing, cyanosis, or edema.  Psych:alert and oriented x 3, depressed mood and " affect        Assessment/Plan:  1. Lumbar stenosis with neurogenic claudication  tizanidine (ZANAFLEX) 4 MG Tab    oxycodone-acetaminophen (PERCOCET-10)  MG Tab    DISCONTINUED: oxycodone-acetaminophen (PERCOCET-10)  MG Tab    DISCONTINUED: oxycodone-acetaminophen (PERCOCET-10)  MG Tab    Uncontrolled, healing after surgery   2. Chronic use of opiate drugs therapeutic purposes  oxycodone-acetaminophen (PERCOCET-10)  MG Tab    DISCONTINUED: oxycodone-acetaminophen (PERCOCET-10)  MG Tab    DISCONTINUED: oxycodone-acetaminophen (PERCOCET-10)  MG Tab    Controlled, continues on narcotics   3. Moderate episode of recurrent major depressive disorder (CMS-HCC)  sertraline (ZOLOFT) 50 MG Tab    Uncontrolled, increasing Zoloft   4. Depressive disorder     5. Diabetes mellitus type 2 in obese (CMS-HCC)  HEMOGLOBIN A1C    COMP METABOLIC PANEL    Uncontrolled, on medications   6. Obstructive sleep apnea     7. Type 2 diabetes mellitus with complication, without long-term current use of insulin ([facility])  Misc. Devices Misc   8. Degeneration of lumbar or lumbosacral intervertebral disc  tizanidine (ZANAFLEX) 4 MG Tab    oxycodone-acetaminophen (PERCOCET-10)  MG Tab    DISCONTINUED: oxycodone-acetaminophen (PERCOCET-10)  MG Tab    DISCONTINUED: oxycodone-acetaminophen (PERCOCET-10)  MG Tab    Uncontrolled, patient following with neurosurgery   9. Lumbar stenosis with neurogenic claudication  tizanidine (ZANAFLEX) 4 MG Tab    oxycodone-acetaminophen (PERCOCET-10)  MG Tab    DISCONTINUED: oxycodone-acetaminophen (PERCOCET-10)  MG Tab    DISCONTINUED: oxycodone-acetaminophen (PERCOCET-10)  MG Tab    Uncontrolled, patient following with neurosurgery   10. Chronic bilateral low back pain without sciatica  tizanidine (ZANAFLEX) 4 MG Tab   11. Lumbar stenosis  tizanidine (ZANAFLEX) 4 MG Tab   12. Chronic use of opiate drugs therapeutic purposes  oxycodone-acetaminophen  (PERCOCET-10)  MG Tab    DISCONTINUED: oxycodone-acetaminophen (PERCOCET-10)  MG Tab    DISCONTINUED: oxycodone-acetaminophen (PERCOCET-10)  MG Tab    Uncontrolled, refill medication   13. Lumbar stenosis with neurogenic claudication  tizanidine (ZANAFLEX) 4 MG Tab    oxycodone-acetaminophen (PERCOCET-10)  MG Tab    DISCONTINUED: oxycodone-acetaminophen (PERCOCET-10)  MG Tab    DISCONTINUED: oxycodone-acetaminophen (PERCOCET-10)  MG Tab    Uncontrolled, patient to follow with Worker's Comp.     Please note that this dictation was created using voice recognition software. I have made every reasonable attempt to correct obvious errors, but I expect that there are errors of grammar and possibly content that I did not discover before finalizing the note.

## 2017-02-28 NOTE — ASSESSMENT & PLAN NOTE
Patient is a 56-year-old male who comes in for follow-up. I last saw him about one month ago. Patient has a long history of chronic back problems and had surgery in January 2016. He was recovering from that surgery returned to work in the spring of 2016 when he had an injury at work. He had acute new back pain and worsening symptoms for several months. It is a long time for Worker's Compensation to address his symptoms and get him in with his surgeon. It was found that his hardware had become displaced from his most recent surgery. Patient has continued on a pain contract with our practice which he has been on for several years. He recently underwent back surgery by his surgeon and has been on narcotics from his surgeon. He will be transitioning back to us for his pain medications.

## 2017-02-28 NOTE — ASSESSMENT & PLAN NOTE
He has not been on benzodiazepines but is currently on Ambien. We discussed that there is been no clear guidelines on use of narcotics and Ambien and while I'm okay with it for now, that may change in the future or with another provider. Patient most recently received #90 tabs of oxycodone acetaminophen 10/325mg from his surgeon on February 20. He is taking up to 8 tabs a day. I had clearly told him his last visit that I cannot write for this amount and he is willing to reduce to 6 tablets a day. He will be due for refill on March 3. I am prescribing his refill for #180 tabs of this medication to last one month dated March 3. He notes that his surgeon also gave him Valium to help with muscle spasm for sleep at night. I told him he must discontinue this medication and he tells me he will stop it. Patient received prescriptions for the next 3 months.      I consulted the  report as of today and no concerns were noted.  Consequences of Chronic Opiate therapy:  (5 A's)  Analgesia:  not changed  Activity:  not changed  Adverse Events:  none  Aberrant Behaviors:  none  Affect/Mood: good grooming, full facial expressions  Last CMP:   This year  Appropriate Imaging done:   yes

## 2017-02-28 NOTE — ASSESSMENT & PLAN NOTE
Patient has type 2 diabetes and does continue on metformin, glipizide, Starlix. He is on statin and losartan. Hemoglobin A1c was recent 7.4% which is not terrible given the fact that he's been in a lot of pain and had surgery. He is continuing to work on diet and will recheck in 3 months.

## 2017-04-25 ENCOUNTER — HOSPITAL ENCOUNTER (OUTPATIENT)
Dept: LAB | Facility: MEDICAL CENTER | Age: 57
End: 2017-04-25
Attending: INTERNAL MEDICINE
Payer: COMMERCIAL

## 2017-04-25 DIAGNOSIS — E66.9 DIABETES MELLITUS TYPE 2 IN OBESE (HCC): ICD-10-CM

## 2017-04-25 DIAGNOSIS — E11.69 DIABETES MELLITUS TYPE 2 IN OBESE (HCC): ICD-10-CM

## 2017-04-25 LAB
ALBUMIN SERPL BCP-MCNC: 4.5 G/DL (ref 3.2–4.9)
ALBUMIN/GLOB SERPL: 1.6 G/DL
ALP SERPL-CCNC: 136 U/L (ref 30–99)
ALT SERPL-CCNC: 37 U/L (ref 2–50)
ANION GAP SERPL CALC-SCNC: 8 MMOL/L (ref 0–11.9)
AST SERPL-CCNC: 31 U/L (ref 12–45)
BILIRUB SERPL-MCNC: 0.9 MG/DL (ref 0.1–1.5)
BUN SERPL-MCNC: 17 MG/DL (ref 8–22)
CALCIUM SERPL-MCNC: 9.7 MG/DL (ref 8.5–10.5)
CHLORIDE SERPL-SCNC: 104 MMOL/L (ref 96–112)
CO2 SERPL-SCNC: 27 MMOL/L (ref 20–33)
CREAT SERPL-MCNC: 0.92 MG/DL (ref 0.5–1.4)
EST. AVERAGE GLUCOSE BLD GHB EST-MCNC: 174 MG/DL
GFR SERPL CREATININE-BSD FRML MDRD: >60 ML/MIN/1.73 M 2
GLOBULIN SER CALC-MCNC: 2.9 G/DL (ref 1.9–3.5)
GLUCOSE SERPL-MCNC: 152 MG/DL (ref 65–99)
HBA1C MFR BLD: 7.7 % (ref 0–5.6)
POTASSIUM SERPL-SCNC: 4.4 MMOL/L (ref 3.6–5.5)
PROT SERPL-MCNC: 7.4 G/DL (ref 6–8.2)
SODIUM SERPL-SCNC: 139 MMOL/L (ref 135–145)

## 2017-04-25 PROCEDURE — 80053 COMPREHEN METABOLIC PANEL: CPT

## 2017-04-25 PROCEDURE — 36415 COLL VENOUS BLD VENIPUNCTURE: CPT

## 2017-04-25 PROCEDURE — 83036 HEMOGLOBIN GLYCOSYLATED A1C: CPT

## 2017-04-27 NOTE — TELEPHONE ENCOUNTER
Was the patient seen in the last year in this department? Yes       Does patient have an active prescription for medications requested? No     Received Request Via: Pharmacy      Pt met protocol?: Yes pt last ov 2/2017

## 2017-04-28 RX ORDER — GABAPENTIN 300 MG/1
CAPSULE ORAL
Qty: 540 CAP | Refills: 0 | Status: SHIPPED | OUTPATIENT
Start: 2017-04-28 | End: 2017-08-10 | Stop reason: SDUPTHER

## 2017-05-18 ENCOUNTER — OFFICE VISIT (OUTPATIENT)
Dept: MEDICAL GROUP | Facility: PHYSICIAN GROUP | Age: 57
End: 2017-05-18
Payer: COMMERCIAL

## 2017-05-18 VITALS
DIASTOLIC BLOOD PRESSURE: 80 MMHG | OXYGEN SATURATION: 94 % | WEIGHT: 315 LBS | HEIGHT: 73 IN | RESPIRATION RATE: 16 BRPM | SYSTOLIC BLOOD PRESSURE: 128 MMHG | TEMPERATURE: 97.5 F | HEART RATE: 77 BPM | BODY MASS INDEX: 41.75 KG/M2

## 2017-05-18 DIAGNOSIS — Z79.891 CHRONIC USE OF OPIATE DRUGS THERAPEUTIC PURPOSES: ICD-10-CM

## 2017-05-18 DIAGNOSIS — F51.01 PRIMARY INSOMNIA: ICD-10-CM

## 2017-05-18 DIAGNOSIS — D50.9 IRON DEFICIENCY ANEMIA, UNSPECIFIED IRON DEFICIENCY ANEMIA TYPE: ICD-10-CM

## 2017-05-18 DIAGNOSIS — I10 ESSENTIAL HYPERTENSION: ICD-10-CM

## 2017-05-18 DIAGNOSIS — G47.33 OBSTRUCTIVE SLEEP APNEA: ICD-10-CM

## 2017-05-18 DIAGNOSIS — M48.062 LUMBAR STENOSIS WITH NEUROGENIC CLAUDICATION: ICD-10-CM

## 2017-05-18 DIAGNOSIS — E11.69 DIABETES MELLITUS TYPE 2 IN OBESE (HCC): ICD-10-CM

## 2017-05-18 DIAGNOSIS — M51.37 DEGENERATION OF LUMBAR OR LUMBOSACRAL INTERVERTEBRAL DISC: ICD-10-CM

## 2017-05-18 DIAGNOSIS — E66.9 DIABETES MELLITUS TYPE 2 IN OBESE (HCC): ICD-10-CM

## 2017-05-18 PROCEDURE — 99214 OFFICE O/P EST MOD 30 MIN: CPT | Performed by: FAMILY MEDICINE

## 2017-05-18 RX ORDER — OXYCODONE AND ACETAMINOPHEN 10; 325 MG/1; MG/1
1-2 TABLET ORAL EVERY 6 HOURS PRN
Qty: 180 TAB | Refills: 0 | Status: SHIPPED | OUTPATIENT
Start: 2017-05-18 | End: 2017-05-18 | Stop reason: SDUPTHER

## 2017-05-18 RX ORDER — OXYCODONE AND ACETAMINOPHEN 10; 325 MG/1; MG/1
1-2 TABLET ORAL EVERY 6 HOURS PRN
Qty: 180 TAB | Refills: 0 | Status: SHIPPED | OUTPATIENT
Start: 2017-07-16 | End: 2017-08-15

## 2017-05-18 RX ORDER — OXYCODONE AND ACETAMINOPHEN 10; 325 MG/1; MG/1
1-2 TABLET ORAL EVERY 6 HOURS PRN
Qty: 180 TAB | Refills: 0 | Status: SHIPPED | OUTPATIENT
Start: 2017-06-16 | End: 2017-05-18 | Stop reason: SDUPTHER

## 2017-05-18 RX ORDER — NALOXONE HYDROCHLORIDE 4 MG/.1ML
1 SPRAY NASAL PRN
Qty: 1 EACH | Refills: 0 | Status: SHIPPED | OUTPATIENT
Start: 2017-05-18 | End: 2018-05-10

## 2017-05-18 ASSESSMENT — PATIENT HEALTH QUESTIONNAIRE - PHQ9: CLINICAL INTERPRETATION OF PHQ2 SCORE: 0

## 2017-05-18 NOTE — ASSESSMENT & PLAN NOTE
He has chronic insomnia. This is managed with trazodone. He is also on a CPAP machine. He states symptoms are controlled.

## 2017-05-18 NOTE — PROGRESS NOTES
Subjective:   Jamey Riojas is a 56 y.o. male here today for hypertension, chronic pain and pain medication refills, diabetes type 2, and iron deficiency anemia.     HTN (hypertension)  Controlled on losartan and amlodipine. Patient states he has not tried lisinopril in the past. He has no vision changes. He has occasional mild headaches. He has no chest pain. He uses CPAP at night.    Chronic use of opiate drugs therapeutic purposes  Patient has significant back pain, lumbar radiculopathy and significant knee pain due to a meniscus injury on his left knee after a fall a year ago. He has had numerous surgeries on his back, last one being 3 years ago. The fall, twisted his back and caused injury to the site of previous surgery. Patient is currently on Percocet 10/325 milligrams tablets up to 6 times a day. He is not able to work because the pain. He does want to return to work and has knee surgery planned on June 16 to correct the meniscus injury. Patient states that he is unable to decrease the frequency or strength of the medication. Currently, due to severe pain. I advised him that for the next 3 months. We can stay at the current dose, but regardless of how surgery pends out. We will be weaning off opioid pain medication as this is not indicated for chronic pain management. He continues on Celebrex and gabapentin . Patient is on Zoloft 50 mg.  BMP and UDS reviewed with no inconsistencies.      Iron deficiency anemia  He states he has anemia only after back surgeries. He's had numerous spine surgeries in the past. Currently he is not on an iron supplement. He had a colonoscopy 4 years ago which showed one polyp and his next colonoscopy is due next year. He reports no blood in the stool, no night sweats, no lymphadenopathy.    Diabetes mellitus type 2 in obese  His last A1c was 7.7  He has had significant increase in weight due to decreased activity with recent pain in his knees. He is scheduled for knee  surgery.   LDL 59 without statin  He is on aspirin 81 mg and losartan.    He takes metformin 1000 mg twice a day and glipizide 5 mg. He states he is working on portion control and decreasing carbohydrates.     Insomnia  He has chronic insomnia. This is managed with trazodone. He is also on a CPAP machine. He states symptoms are controlled.    Obstructive sleep apnea  Patient continues on CPAP for sleep apnea. He is working on weight loss.         Current medicines (including changes today)  Current Outpatient Prescriptions   Medication Sig Dispense Refill   • Naloxone HCl 4 MG/0.1ML Liquid Spray 1 Spray in nose as needed (For severe sleepiness or difficulty breathing from possible overdose. Call 911 after administration.). 1 Each 0   • [START ON 7/16/2017] oxycodone-acetaminophen (PERCOCET-10)  MG Tab Take 1-2 Tabs by mouth every 6 hours as needed for Moderate Pain or Severe Pain (back pain) for up to 30 days. 180 Tab 0   • gabapentin (NEURONTIN) 300 MG Cap TAKE TWO CAPSULES BY MOUTH THREE TIMES DAILY 540 Cap 0   • sertraline (ZOLOFT) 50 MG Tab Take 1 Tab by mouth every day. 90 Tab 3   • Misc. Devices Misc One touch ultra test strips. Patient has DM type 2 and tests twice per day 100 Each 3   • Misc. Devices Misc One touch ultra lancets; patient has diabetes mellitus type 2 and test once per day 100 Device 3   • tizanidine (ZANAFLEX) 4 MG Tab Take 1 Tab by mouth every 6 hours as needed (back pain). 270 Tab 3   • glipiZIDE (GLUCOTROL) 5 MG Tab TAKE ONE TABLET BY MOUTH TWICE DAILY 180 Tab 3   • finasteride (PROSCAR) 5 MG Tab Take 1 Tab by mouth every day. 90 Tab 3   • trazodone (DESYREL) 100 MG Tab Take 100-200mg at night for sleep if needed. 180 Tab 3   • celecoxib (CELEBREX) 200 MG Cap TAKE ONE CAPSULE BY MOUTH TWICE DAILY 180 Cap 3   • tamsulosin (FLOMAX) 0.4 MG capsule Take 2 Caps by mouth every day. 180 Cap 3   • nateglinide (STARLIX) 120 MG Tab TAKE ONE TABLET BY MOUTH THREE TIMES DAILY BEFORE MEAL(S) 270  "Tab 3   • amlodipine (NORVASC) 10 MG Tab TAKE ONE TABLET BY MOUTH ONCE DAILY 90 Tab 3   • losartan (COZAAR) 100 MG Tab TAKE ONE TABLET BY MOUTH ONCE DAILY 90 Tab 3   • metformin (GLUCOPHAGE) 1000 MG tablet TAKE ONE TABLET BY MOUTH TWICE DAILY WITH MEALS 180 Tab 3   • lansoprazole (PREVACID) 30 MG CAPSULE DELAYED RELEASE Take 1 Cap by mouth every day. 30 Cap 3   • omeprazole (PRILOSEC) 20 MG delayed-release capsule TAKE ONE CAPSULE BY MOUTH ONCE DAILY 90 Cap 3   • nystatin (MYCOSTATIN) 637098 UNIT/GM Ointment Apply to decubitus ulcer 8 times per day in a thick layer 6 Tube 3   • Misc. Devices Misc CPAP supplies; fax to [facility] [phone]. 100 Device 3   • fluticasone (FLONASE) 50 MCG/ACT nasal spray Spray 2 Sprays in nose every day. Each Nostril 1 Bottle 3   • albuterol (VENTOLIN OR PROVENTIL) 108 (90 BASE) MCG/ACT AERS inhalation aerosol Inhale 2 Puffs by mouth every 6 hours as needed for Shortness of Breath. 8.5 g 3   • Multiple Vitamins-Minerals (MULTIVITAMIN PO) Take 1 Tab by mouth every day.       • Fexofenadine HCl (ALLEGRA PO) Take  by mouth as needed.     • Cholecalciferol (VITAMIN D) 2000 UNIT CAPS Take  by mouth every day.     • GLUCOSAMINE-CHONDROITIN-MSM PO Take 2 Tabs by mouth 2 Times a Day.     • aspirin 81 MG tablet Take 81 mg by mouth every day.       No current facility-administered medications for this visit.     He  has a past medical history of Arthritis; Snoring; Diabetes; Pain (12-14-12); Hypertension; Heart burn; Insomnia (12/21/2011); GERD (gastroesophageal reflux disease) (12/21/2011); Psychiatric problem; Sleep apnea; Bronchitis (2009); Enlarged liver; Indigestion; Anesthesia; Nephrolithiasis (7/30/2014); Dysuria (7/30/2014); Body aches (11/19/2014); Anxiety; Obesity; and BRAIN (obstructive sleep apnea).    ROS   No chest pain, no shortness of breath, no abdominal pain       Objective:     Blood pressure 128/80, pulse 77, temperature 36.4 °C (97.5 °F), resp. rate 16, height 1.854 m (6' 1\"), " weight 143.79 kg (317 lb), SpO2 94 %. Body mass index is 41.83 kg/(m^2).   Physical Exam:  Constitutional: Alert, no distress.  Skin: Warm, dry, good turgor, no rashes in visible areas.  Eye: Equal, round and reactive, conjunctiva clear, lids normal.  ENMT: Lips without lesions, good dentition, oropharynx clear.  Neck: Trachea midline, no masses, no thyromegaly. No cervical or supraclavicular lymphadenopathy  Respiratory: Unlabored respiratory effort, lungs clear to auscultation, no wheezes, no ronchi.  Cardiovascular: Normal S1, S2, no murmur, no edema.  Abdomen: Soft, non-tender, no masses, no hepatosplenomegaly.  Psych: Alert and oriented x3, normal affect and mood.        Assessment and Plan:   The following treatment plan was discussed    1. Essential hypertension  Controlled. Continue current medications.    2. Degeneration of lumbar or lumbosacral intervertebral disc  Continue with weight loss, regular exercise, gabapentin for pain control. Pain medications refilled  - oxycodone-acetaminophen (PERCOCET-10)  MG Tab; Take 1-2 Tabs by mouth every 6 hours as needed for Moderate Pain or Severe Pain (back pain) for up to 30 days.  Dispense: 180 Tab; Refill: 0    3. Lumbar stenosis with neurogenic claudication  Follow-up with spine specialist. Pain medications refilled. Advised on weight loss.  - oxycodone-acetaminophen (PERCOCET-10)  MG Tab; Take 1-2 Tabs by mouth every 6 hours as needed for Moderate Pain or Severe Pain (back pain) for up to 30 days.  Dispense: 180 Tab; Refill: 0    4. Chronic use of opiate drugs therapeutic purposes   advised on risk of respiratory depression. Patient did not seem to think this is a real risk. He understands that we will be gradually weaning down Percocet at next visit.  - Naloxone HCl 4 MG/0.1ML Liquid; Spray 1 Spray in nose as needed (For severe sleepiness or difficulty breathing from possible overdose. Call 911 after administration.).  Dispense: 1 Each; Refill: 0  -  oxycodone-acetaminophen (PERCOCET-10)  MG Tab; Take 1-2 Tabs by mouth every 6 hours as needed for Moderate Pain or Severe Pain (back pain) for up to 30 days.  Dispense: 180 Tab; Refill: 0    5. Iron deficiency anemia, unspecified iron deficiency anemia type  Follow-up with colonoscopy next year. May need iron supplementation after surgery planned next month.    6. Diabetes mellitus type 2 in obese (CMS-HCC)  Continue current medications.  - MICROALBUMIN CREAT RATIO URINE (LAB COLLECT); Future    7. Primary insomnia  Continue trazodone. 8. Obstructive sleep apnea  Continue CPAP machine. Advised on weight loss.      Followup: Return in about 3 months (around 8/18/2017) for htn, dm, pain med refills.

## 2017-05-18 NOTE — MR AVS SNAPSHOT
"        Jamey Riojas   2017 10:20 AM   Office Visit   MRN: [id number]    Department:  [facility]   Dept Phone:  [phone]    Description:  Male : 1960   Provider:  Ryann Street M.D.           Reason for Visit     Diabetes     Medication Refill percocet      Allergies as of 2017     Allergen Noted Reactions    Penicillins 10/20/2009   Swelling    \"Tongue swells up\"    Phenergan [Promethazine Hcl] 10/15/2009       \"Jittery and hallucinating\"    Influenza Virus Vacc 2012       \"deathly ill\"      You were diagnosed with     Essential hypertension   [2779445]       Degeneration of lumbar or lumbosacral intervertebral disc   [722.52.ICD-9-CM]   Uncontrolled, patient following with neurosurgery    Lumbar stenosis with neurogenic claudication   [088226]   Uncontrolled, healing after surgery    Chronic use of opiate drugs therapeutic purposes   [0559985]   Controlled, continues on narcotics    Iron deficiency anemia, unspecified iron deficiency anemia type   [7120643]       Diabetes mellitus type 2 in obese (CMS-[facility])   [017762]       Primary insomnia   [003062]         Vital Signs     Blood Pressure Pulse Temperature Respirations Height Weight    128/80 mmHg 77 36.4 °C (97.5 °F) 16 1.854 m (6' 1\") 143.79 kg (317 lb)    Body Mass Index Oxygen Saturation Smoking Status             41.83 kg/m2 94% Never Smoker          Basic Information     Date Of Birth Sex Race Ethnicity Preferred Language    1960 Male White Non- English      Your appointments     Aug 10, 2017 10:20 AM   Established Patient with Ryann Street M.D.   [facility] ([address] [address] [phone]           You will be receiving a confirmation call a few days before your appointment from our automated call confirmation system.              Problem List              ICD-10-CM Priority Class Noted - Resolved    Diabetes mellitus type 2 in obese " (CMS-HCC) E11.9, E66.9   12/21/2011 - Present    HTN (hypertension) I10 Medium  12/21/2011 - Present    Insomnia G47.00   12/21/2011 - Present    GERD (gastroesophageal reflux disease) K21.9   12/21/2011 - Present    Chronic back pain M54.9, G89.29   12/21/2011 - Present    Vitamin D deficiency disease E55.9 Low  1/12/2012 - Present    Sleep disorder breathing G47.30   8/30/2012 - Present    Chronic pain of left knee M25.562, G89.29   9/18/2012 - Present    Degeneration of cervical intervertebral disc M50.30 High  11/30/2012 - Present    Major depression (CMS-HCC) F32.9 Low  12/2/2012 - Present    Obstructive sleep apnea G47.33 Medium  12/2/2012 - Present    Nausea & vomiting R11.2 High  12/2/2012 - Present    Iron deficiency anemia D50.9 Medium  12/2/2012 - Present    Degeneration of lumbar or lumbosacral intervertebral disc M51.37   12/30/2012 - Present    BPH (benign prostatic hyperplasia) N40.0   12/20/2013 - Present    Nephrolithiasis N20.0   7/30/2014 - Present    Dysuria R30.0   7/30/2014 - Present    Body aches R52   11/19/2014 - Present    Sinusitis J32.9   4/21/2015 - Present    Anemia of unknown etiology D64.9   5/21/2015 - Present    Rib pain on left side R07.81   10/22/2015 - Present    Lumbar stenosis with neurogenic claudication M48.06   12/31/2015 - Present    Chronic use of opiate drugs therapeutic purposes Z79.891   8/11/2016 - Present      Health Maintenance        Date Due Completion Dates    IMM HEP B VACCINE (1 of 3 - Primary Series) 1960 ---    RETINAL SCREENING 6/12/1980 ---    DIABETES MONOFILAMENT / LE EXAM 1/12/2013 1/12/2012 (N/S)    Override on 1/12/2012: (N/S)    COLONOSCOPY 1/1/2017 1/1/2012 (Done)    Override on 1/1/2012: Done (GI consult- polyps)    FASTING LIPID PROFILE 8/9/2017 8/9/2016, 5/18/2015, 9/25/2013, 4/25/2013, 1/5/2012    URINE ACR / MICROALBUMIN 8/9/2017 8/9/2016, 5/19/2015, 2/2/2012    A1C SCREENING 10/25/2017 4/25/2017, 1/25/2017, 8/9/2016, 12/31/2015, 5/18/2015,  12/30/2013, 9/25/2013, 4/25/2013, 12/2/2012, 10/20/2012, 7/7/2012, 2/2/2012, 1/5/2012, 6/9/2010, 9/4/2007    SERUM CREATININE 4/25/2018 4/25/2017, 1/25/2017, 8/9/2016, 12/31/2015, 12/11/2015, 5/18/2015, 12/30/2013, 9/25/2013, 4/25/2013, 1/3/2013, 1/2/2013, 12/5/2012, 12/3/2012, 12/2/2012, 11/27/2012, 10/20/2012, 9/10/2012, 7/7/2012, 4/25/2012, 4/23/2012, 2/2/2012, 1/5/2012, 11/15/2010, 6/9/2010, 5/10/2010, 10/15/2009, 9/10/2007, 9/4/2007    IMM DTaP/Tdap/Td Vaccine (2 - Td) 11/8/2025 11/8/2015            Current Immunizations     Pneumococcal polysaccharide vaccine (PPSV-23) 12/30/2005    Tdap Vaccine 11/8/2015      Below and/or attached are the medications your provider expects you to take. Review all of your home medications and newly ordered medications with your provider and/or pharmacist. Follow medication instructions as directed by your provider and/or pharmacist. Please keep your medication list with you and share with your provider. Update the information when medications are discontinued, doses are changed, or new medications (including over-the-counter products) are added; and carry medication information at all times in the event of emergency situations     Allergies:  PENICILLINS - Swelling     PHENERGAN - (reactions not documented)     INFLUENZA VIRUS VACC - (reactions not documented)               Medications  Valid as of: May 18, 2017 - 11:12 AM    Generic Name Brand Name Tablet Size Instructions for use    Albuterol Sulfate (Aero Soln) albuterol 108 (90 BASE) MCG/ACT Inhale 2 Puffs by mouth every 6 hours as needed for Shortness of Breath.        AmLODIPine Besylate (Tab) NORVASC 10 MG TAKE ONE TABLET BY MOUTH ONCE DAILY        Aspirin (Tab) aspirin 81 MG Take 81 mg by mouth every day.        Celecoxib (Cap) CELEBREX 200 MG TAKE ONE CAPSULE BY MOUTH TWICE DAILY        Cholecalciferol (Cap) Vitamin D 2000 UNITS Take  by mouth every day.        Fexofenadine HCl   Take  by mouth as needed.         Finasteride (Tab) PROSCAR 5 MG Take 1 Tab by mouth every day.        Fluticasone Propionate (Suspension) FLONASE 50 MCG/ACT Spray 2 Sprays in nose every day. Each Nostril        Gabapentin (Cap) NEURONTIN 300 MG TAKE TWO CAPSULES BY MOUTH THREE TIMES DAILY        GlipiZIDE (Tab) GLUCOTROL 5 MG TAKE ONE TABLET BY MOUTH TWICE DAILY        Glucosamine-Chondroitin-MSM   Take 2 Tabs by mouth 2 Times a Day.        Lansoprazole (CAPSULE DELAYED RELEASE) PREVACID 30 MG Take 1 Cap by mouth every day.        Losartan Potassium (Tab) COZAAR 100 MG TAKE ONE TABLET BY MOUTH ONCE DAILY        MetFORMIN HCl (Tab) GLUCOPHAGE 1000 MG TAKE ONE TABLET BY MOUTH TWICE DAILY WITH MEALS        Misc. Devices (Misc) Misc. Devices  CPAP supplies; fax to [facility] [phone].        Misc. Devices (Misc) Misc. Devices  One touch ultra test strips. Patient has DM type 2 and tests twice per day        Misc. Devices (Misc) Misc. Devices  One touch ultra lancets; patient has diabetes mellitus type 2 and test once per day        Multiple Vitamins-Minerals   Take 1 Tab by mouth every day.          Naloxone HCl (Liquid) Naloxone HCl 4 MG/0.1ML Spray 1 Spray in nose as needed (For severe sleepiness or difficulty breathing from possible overdose. Call 911 after administration.).        Nateglinide (Tab) STARLIX 120 MG TAKE ONE TABLET BY MOUTH THREE TIMES DAILY BEFORE MEAL(S)        Nystatin (Ointment) MYCOSTATIN 721751 UNIT/GM Apply to decubitus ulcer 8 times per day in a thick layer        Omeprazole (CAPSULE DELAYED RELEASE) PRILOSEC 20 MG TAKE ONE CAPSULE BY MOUTH ONCE DAILY        Oxycodone-Acetaminophen (Tab) PERCOCET-10  MG Take 1-2 Tabs by mouth every 6 hours as needed for Moderate Pain or Severe Pain (back pain) for up to 30 days.        Sertraline HCl (Tab) ZOLOFT 50 MG Take 1 Tab by mouth every day.        Tamsulosin HCl (Cap) FLOMAX 0.4 MG Take 2 Caps by mouth every day.        TiZANidine HCl (Tab) ZANAFLEX 4 MG Take 1 Tab by mouth every  6 hours as needed (back pain).        TraZODone HCl (Tab) DESYREL 100 MG Take 100-200mg at night for sleep if needed.        .                 Medicines prescribed today were sent to:     [id number]  [place], [place] - [address]    [address] [place] [address]    Phone: [phone] Fax: [phone]    Open 24 Hours?: No    [facility] [address] [place], [place] - [address]    [address] [address]    Phone: [phone] Fax: [phone]    Open 24 Hours?: No      Medication refill instructions:       If your prescription bottle indicates you have medication refills left, it is not necessary to call your provider’s office. Please contact your pharmacy and they will refill your medication.    If your prescription bottle indicates you do not have any refills left, you may request refills at any time through one of the following ways: The online [employer] system (except Urgent Care), by calling your provider’s office, or by asking your pharmacy to contact your provider’s office with a refill request. Medication refills are processed only during regular business hours and may not be available until the next business day. Your provider may request additional information or to have a follow-up visit with you prior to refilling your medication.   *Please Note: Medication refills are assigned a new Rx number when refilled electronically. Your pharmacy may indicate that no refills were authorized even though a new prescription for the same medication is available at the pharmacy. Please request the medicine by name with the pharmacy before contacting your provider for a refill.        Your To Do List     Future Labs/Procedures Complete By Expires    MICROALBUMIN CREAT RATIO URINE (LAB COLLECT)  As directed 5/18/2018      Other Notes About Your Plan     Last UDS:  5/21/15  DR KIERAN Morganed Substance agreement signed:  5/21/15  DR ALCARAZ           MEETiiN Access Code: [email or web address]  Expires: 6/9/2017  1:18 PM    MEETiiN  A secure, online tool to manage your health information     [email or web address]’s MEETiiN® is a secure, online tool that connects you to your personalized health information from the privacy of your home -- day or night - making it very easy for you to manage your healthcare. Once the activation process is completed, you can even access your medical information using the MEETiiN piotr, which is available for free in the Apple Piotr store or Google Play store.     MEETiiN provides the following levels of access (as shown below):   My Chart Features   [facility] Primary [facility] Doctor [facility]  Specialists [facility]  Urgent  Care Non-[facility]  Primary Care  Doctor   Email your healthcare team securely and privately 24/7 X X X    Manage appointments: schedule your next appointment; view details of past/upcoming appointments X      Request prescription refills. X      View recent personal medical records, including lab and immunizations X X X X   View health record, including health history, allergies, medications X X X X   Read reports about your outpatient visits, procedures, consult and ER notes X X X X   See your discharge summary, which is a recap of your hospital and/or ER visit that includes your diagnosis, lab results, and care plan. X X       How to register for MEETiiN:  1. Go to  [email or web address].  2. Click on the Sign Up Now box, which takes you to the New Member Sign Up page. You will need to provide the following information:  a. Enter your MEETiiN Access Code exactly as it appears at the top of this page. (You will not need to use this code after you’ve completed the sign-up process. If you do not sign up before the expiration date, you must request a new code.)   b. Enter your date of birth.   c. Enter your home email address.   d. Click Submit, and follow the next screen’s instructions.  3. Create a MEETiiN ID.  This will be your Erenis login ID and cannot be changed, so think of one that is secure and easy to remember.  4. Create a Erenis password. You can change your password at any time.  5. Enter your Password Reset Question and Answer. This can be used at a later time if you forget your password.   6. Enter your e-mail address. This allows you to receive e-mail notifications when new information is available in Erenis.  7. Click Sign Up. You can now view your health information.    For assistance activating your Erenis account, call [phone]

## 2017-05-18 NOTE — ASSESSMENT & PLAN NOTE
Controlled on losartan and amlodipine. Patient states he has not tried lisinopril in the past. He has no vision changes. He has occasional mild headaches. He has no chest pain. He uses CPAP at night.

## 2017-05-18 NOTE — ASSESSMENT & PLAN NOTE
He states he has anemia only after back surgeries. He's had numerous spine surgeries in the past. Currently he is not on an iron supplement. He had a colonoscopy 4 years ago which showed one polyp and his next colonoscopy is due next year. He reports no blood in the stool, no night sweats, no lymphadenopathy.

## 2017-05-18 NOTE — ASSESSMENT & PLAN NOTE
His last A1c was 7.7  He has had significant increase in weight due to decreased activity with recent pain in his knees. He is scheduled for knee surgery.   LDL 59 without statin  He is on aspirin 81 mg and losartan.    He takes metformin 1000 mg twice a day and glipizide 5 mg. He states he is working on portion control and decreasing carbohydrates.

## 2017-05-18 NOTE — ASSESSMENT & PLAN NOTE
Patient has significant back pain, lumbar radiculopathy and significant knee pain due to a meniscus injury on his left knee after a fall a year ago. He has had numerous surgeries on his back, last one being 3 years ago. The fall, twisted his back and caused injury to the site of previous surgery. Patient is currently on Percocet 10/325 milligrams tablets up to 6 times a day. He is not able to work because the pain. He does want to return to work and has knee surgery planned on June 16 to correct the meniscus injury. Patient states that he is unable to decrease the frequency or strength of the medication. Currently, due to severe pain. I advised him that for the next 3 months. We can stay at the current dose, but regardless of how surgery pends out. We will be weaning off opioid pain medication as this is not indicated for chronic pain management. He continues on Celebrex and gabapentin . Patient is on Zoloft 50 mg.  BMP and UDS reviewed with no inconsistencies.

## 2017-06-08 ENCOUNTER — TELEPHONE (OUTPATIENT)
Dept: MEDICAL GROUP | Facility: PHYSICIAN GROUP | Age: 57
End: 2017-06-08

## 2017-06-08 ENCOUNTER — NON-PROVIDER VISIT (OUTPATIENT)
Dept: URGENT CARE | Facility: PHYSICIAN GROUP | Age: 57
End: 2017-06-08
Payer: COMMERCIAL

## 2017-06-08 ENCOUNTER — NON-PROVIDER VISIT (OUTPATIENT)
Dept: MEDICAL GROUP | Facility: PHYSICIAN GROUP | Age: 57
End: 2017-06-08
Payer: COMMERCIAL

## 2017-06-08 ENCOUNTER — APPOINTMENT (OUTPATIENT)
Dept: RADIOLOGY | Facility: IMAGING CENTER | Age: 57
End: 2017-06-08
Attending: ORTHOPAEDIC SURGERY
Payer: COMMERCIAL

## 2017-06-08 ENCOUNTER — HOSPITAL ENCOUNTER (OUTPATIENT)
Dept: LAB | Facility: MEDICAL CENTER | Age: 57
End: 2017-06-08
Attending: ORTHOPAEDIC SURGERY
Payer: COMMERCIAL

## 2017-06-08 DIAGNOSIS — Z01.818 PREOP EXAMINATION: ICD-10-CM

## 2017-06-08 DIAGNOSIS — Z01.812 PRE-OPERATIVE LABORATORY EXAMINATION: ICD-10-CM

## 2017-06-08 DIAGNOSIS — Z01.810 PRE-OPERATIVE CARDIOVASCULAR EXAMINATION: ICD-10-CM

## 2017-06-08 DIAGNOSIS — Z01.811 PRE-OPERATIVE RESPIRATORY EXAMINATION: ICD-10-CM

## 2017-06-08 DIAGNOSIS — Z01.818 PRE-OP EXAM: ICD-10-CM

## 2017-06-08 LAB
ALBUMIN SERPL BCP-MCNC: 4.2 G/DL (ref 3.2–4.9)
ALBUMIN/GLOB SERPL: 1.3 G/DL
ALP SERPL-CCNC: 139 U/L (ref 30–99)
ALT SERPL-CCNC: 27 U/L (ref 2–50)
ANION GAP SERPL CALC-SCNC: 11 MMOL/L (ref 0–11.9)
AST SERPL-CCNC: 21 U/L (ref 12–45)
BASOPHILS # BLD AUTO: 0.6 % (ref 0–1.8)
BASOPHILS # BLD: 0.05 K/UL (ref 0–0.12)
BILIRUB SERPL-MCNC: 0.8 MG/DL (ref 0.1–1.5)
BUN SERPL-MCNC: 20 MG/DL (ref 8–22)
CALCIUM SERPL-MCNC: 9.8 MG/DL (ref 8.5–10.5)
CHLORIDE SERPL-SCNC: 102 MMOL/L (ref 96–112)
CO2 SERPL-SCNC: 25 MMOL/L (ref 20–33)
CREAT SERPL-MCNC: 0.85 MG/DL (ref 0.5–1.4)
EOSINOPHIL # BLD AUTO: 0.13 K/UL (ref 0–0.51)
EOSINOPHIL NFR BLD: 1.5 % (ref 0–6.9)
ERYTHROCYTE [DISTWIDTH] IN BLOOD BY AUTOMATED COUNT: 40.3 FL (ref 35.9–50)
GFR SERPL CREATININE-BSD FRML MDRD: >60 ML/MIN/1.73 M 2
GLOBULIN SER CALC-MCNC: 3.2 G/DL (ref 1.9–3.5)
GLUCOSE SERPL-MCNC: 172 MG/DL (ref 65–99)
HCT VFR BLD AUTO: 43.4 % (ref 42–52)
HGB BLD-MCNC: 14.1 G/DL (ref 14–18)
IMM GRANULOCYTES # BLD AUTO: 0.05 K/UL (ref 0–0.11)
IMM GRANULOCYTES NFR BLD AUTO: 0.6 % (ref 0–0.9)
LYMPHOCYTES # BLD AUTO: 2.09 K/UL (ref 1–4.8)
LYMPHOCYTES NFR BLD: 24.8 % (ref 22–41)
MCH RBC QN AUTO: 26.5 PG (ref 27–33)
MCHC RBC AUTO-ENTMCNC: 32.5 G/DL (ref 33.7–35.3)
MCV RBC AUTO: 81.4 FL (ref 81.4–97.8)
MONOCYTES # BLD AUTO: 0.52 K/UL (ref 0–0.85)
MONOCYTES NFR BLD AUTO: 6.2 % (ref 0–13.4)
NEUTROPHILS # BLD AUTO: 5.59 K/UL (ref 1.82–7.42)
NEUTROPHILS NFR BLD: 66.3 % (ref 44–72)
NRBC # BLD AUTO: 0 K/UL
NRBC BLD AUTO-RTO: 0 /100 WBC
PLATELET # BLD AUTO: 225 K/UL (ref 164–446)
PMV BLD AUTO: 9.8 FL (ref 9–12.9)
POTASSIUM SERPL-SCNC: 4.4 MMOL/L (ref 3.6–5.5)
PROT SERPL-MCNC: 7.4 G/DL (ref 6–8.2)
RBC # BLD AUTO: 5.33 M/UL (ref 4.7–6.1)
SODIUM SERPL-SCNC: 138 MMOL/L (ref 135–145)
WBC # BLD AUTO: 8.4 K/UL (ref 4.8–10.8)

## 2017-06-08 PROCEDURE — 80053 COMPREHEN METABOLIC PANEL: CPT

## 2017-06-08 PROCEDURE — 36415 COLL VENOUS BLD VENIPUNCTURE: CPT

## 2017-06-08 PROCEDURE — 93000 ELECTROCARDIOGRAM COMPLETE: CPT | Mod: 29 | Performed by: PHYSICIAN ASSISTANT

## 2017-06-08 PROCEDURE — 85025 COMPLETE CBC W/AUTO DIFF WBC: CPT

## 2017-06-08 PROCEDURE — 71020 DX-CHEST-2 VIEWS: CPT | Mod: 26,29 | Performed by: PHYSICIAN ASSISTANT

## 2017-06-08 NOTE — PROGRESS NOTES
Jamey Riojas is a 56 y.o. male here for a non-provider visit for EKG    If abnormal was an in office provider notified today (if so, indicate provider)? Yes  Routed to PCP? Yes

## 2017-06-28 DIAGNOSIS — M48.062 LUMBAR STENOSIS WITH NEUROGENIC CLAUDICATION: ICD-10-CM

## 2017-06-28 RX ORDER — CELECOXIB 200 MG/1
CAPSULE ORAL
Qty: 180 CAP | Refills: 0 | Status: SHIPPED | OUTPATIENT
Start: 2017-06-28 | End: 2017-10-25 | Stop reason: SDUPTHER

## 2017-07-12 ENCOUNTER — TELEPHONE (OUTPATIENT)
Dept: MEDICAL GROUP | Facility: PHYSICIAN GROUP | Age: 57
End: 2017-07-12

## 2017-07-12 NOTE — TELEPHONE ENCOUNTER
MEDICATION PRIOR AUTHORIZATION NEEDED:    1. Name of Medication:  Celebrex    2. Requested By (Name of Pharmacy): [facility]     3. Is insurance on file current? YES    4. What is the name & phone number of the 3rd party payor? [employer] / Faxed 7/12/17  [phone]

## 2017-08-03 ENCOUNTER — HOSPITAL ENCOUNTER (OUTPATIENT)
Dept: LAB | Facility: MEDICAL CENTER | Age: 57
End: 2017-08-03
Attending: FAMILY MEDICINE
Payer: COMMERCIAL

## 2017-08-03 DIAGNOSIS — E66.9 DIABETES MELLITUS TYPE 2 IN OBESE (HCC): ICD-10-CM

## 2017-08-03 DIAGNOSIS — E11.69 DIABETES MELLITUS TYPE 2 IN OBESE (HCC): ICD-10-CM

## 2017-08-03 LAB
CREAT UR-MCNC: 215.8 MG/DL
MICROALBUMIN UR-MCNC: 1.7 MG/DL
MICROALBUMIN/CREAT UR: 8 MG/G (ref 0–30)

## 2017-08-03 PROCEDURE — 82043 UR ALBUMIN QUANTITATIVE: CPT

## 2017-08-03 PROCEDURE — 82570 ASSAY OF URINE CREATININE: CPT

## 2017-08-10 ENCOUNTER — APPOINTMENT (OUTPATIENT)
Dept: MEDICAL GROUP | Facility: PHYSICIAN GROUP | Age: 57
End: 2017-08-10
Payer: COMMERCIAL

## 2017-08-11 RX ORDER — LOSARTAN POTASSIUM 100 MG/1
TABLET ORAL
Qty: 90 TAB | Refills: 1 | Status: SHIPPED | OUTPATIENT
Start: 2017-08-11 | End: 2018-02-16 | Stop reason: SDUPTHER

## 2017-08-11 RX ORDER — GABAPENTIN 300 MG/1
CAPSULE ORAL
Qty: 540 CAP | Refills: 1 | Status: SHIPPED | OUTPATIENT
Start: 2017-08-11 | End: 2018-04-04 | Stop reason: SDUPTHER

## 2017-08-11 RX ORDER — AMLODIPINE BESYLATE 10 MG/1
TABLET ORAL
Qty: 90 TAB | Refills: 1 | Status: SHIPPED | OUTPATIENT
Start: 2017-08-11 | End: 2018-02-16 | Stop reason: SDUPTHER

## 2017-08-11 NOTE — TELEPHONE ENCOUNTER
Refill X 6 months, sent to pharmacy.Pt. Seen in the last 6 months per protocol.   Lab Results   Component Value Date/Time    SODIUM 138 06/08/2017 12:04 PM    POTASSIUM 4.4 06/08/2017 12:04 PM    CHLORIDE 102 06/08/2017 12:04 PM    CO2 25 06/08/2017 12:04 PM    GLUCOSE 172* 06/08/2017 12:04 PM    BUN 20 06/08/2017 12:04 PM    CREATININE 0.85 06/08/2017 12:04 PM

## 2017-08-17 ENCOUNTER — OFFICE VISIT (OUTPATIENT)
Dept: MEDICAL GROUP | Facility: PHYSICIAN GROUP | Age: 57
End: 2017-08-17
Payer: COMMERCIAL

## 2017-08-17 VITALS
RESPIRATION RATE: 16 BRPM | HEIGHT: 73 IN | SYSTOLIC BLOOD PRESSURE: 140 MMHG | DIASTOLIC BLOOD PRESSURE: 76 MMHG | OXYGEN SATURATION: 94 % | WEIGHT: 315 LBS | TEMPERATURE: 98.1 F | HEART RATE: 75 BPM | BODY MASS INDEX: 41.75 KG/M2

## 2017-08-17 DIAGNOSIS — Z98.890 H/O COLONOSCOPY WITH POLYPECTOMY: ICD-10-CM

## 2017-08-17 DIAGNOSIS — E66.9 DIABETES MELLITUS TYPE 2 IN OBESE (HCC): ICD-10-CM

## 2017-08-17 DIAGNOSIS — F51.01 PRIMARY INSOMNIA: ICD-10-CM

## 2017-08-17 DIAGNOSIS — M25.562 CHRONIC PAIN OF LEFT KNEE: ICD-10-CM

## 2017-08-17 DIAGNOSIS — G89.29 CHRONIC PAIN OF LEFT KNEE: ICD-10-CM

## 2017-08-17 DIAGNOSIS — E11.69 DIABETES MELLITUS TYPE 2 IN OBESE (HCC): ICD-10-CM

## 2017-08-17 DIAGNOSIS — M48.062 LUMBAR STENOSIS WITH NEUROGENIC CLAUDICATION: ICD-10-CM

## 2017-08-17 DIAGNOSIS — Z86.010 H/O COLONOSCOPY WITH POLYPECTOMY: ICD-10-CM

## 2017-08-17 DIAGNOSIS — Z12.11 SCREENING FOR COLON CANCER: ICD-10-CM

## 2017-08-17 PROCEDURE — 99214 OFFICE O/P EST MOD 30 MIN: CPT | Performed by: FAMILY MEDICINE

## 2017-08-17 RX ORDER — OXYCODONE AND ACETAMINOPHEN 10; 325 MG/1; MG/1
1-2 TABLET ORAL EVERY 4 HOURS PRN
Qty: 180 TAB | Refills: 0 | Status: SHIPPED | OUTPATIENT
Start: 2017-09-17 | End: 2017-08-17 | Stop reason: SDUPTHER

## 2017-08-17 RX ORDER — OXYCODONE AND ACETAMINOPHEN 10; 325 MG/1; MG/1
1-2 TABLET ORAL EVERY 4 HOURS PRN
Qty: 180 TAB | Refills: 0 | Status: SHIPPED | OUTPATIENT
Start: 2017-10-17 | End: 2017-11-16

## 2017-08-17 RX ORDER — OXYCODONE AND ACETAMINOPHEN 10; 325 MG/1; MG/1
1-2 TABLET ORAL EVERY 4 HOURS PRN
Qty: 180 TAB | Refills: 0 | Status: SHIPPED | OUTPATIENT
Start: 2017-08-17 | End: 2017-08-17 | Stop reason: SDUPTHER

## 2017-08-17 RX ORDER — ZOLPIDEM TARTRATE 5 MG/1
5 TABLET ORAL NIGHTLY PRN
Qty: 30 TAB | Refills: 2 | Status: SHIPPED | OUTPATIENT
Start: 2017-08-17 | End: 2018-01-30

## 2017-08-17 NOTE — ASSESSMENT & PLAN NOTE
He had surgery in January from low to mid back with laminectomy, removal of old hardware and new hardware put in.   He is currently in PT and finding it quite rough.   He is currently on chronic opiate pain medication for pain control.

## 2017-08-17 NOTE — ASSESSMENT & PLAN NOTE
Last colonoscopy was 5 years ago with polyp removed and recommendation was 5 year f/u.   He had back surgery in Jan and undergoing PT rehab so in a lot of pain, does not want it done now. will defer colonoscopy till feeling better, FIT ordered.

## 2017-08-17 NOTE — PROGRESS NOTES
Subjective:   Jamey Riojas is a 57 y.o. male here today for evaluation and management of:     Chronic pain of left knee  He had knee surgery on left knee in June  In PT now.     Lumbar stenosis with neurogenic claudication  He had surgery in January from low to mid back with laminectomy, removal of old hardware and new hardware put in.   He is currently in PT and finding it quite rough.   He is currently on chronic opiate pain medication for pain control.       Diabetes mellitus type 2 in obese  Last A1c in 7.7  Due for recheck  Normal microalbumin, LDL 59 without statin, due for recheck  Continues on aspirin and losartan.   He takes metformin 1000 bid and glipizide 5 mg  He is working on weight loss.       Insomnia  This is a chronic condition. Worse due to chronic pain. He states he has also always been a light sleeper.   He is on trazodone 100mg bid.   He also uses the CPAP every night.   Will rx ambien 5mg  Caution advised to patient reg respiratory depression especially since on opioid pain medication.     H/O colonoscopy with polypectomy  Last colonoscopy was 5 years ago with polyp removed and recommendation was 5 year f/u.   He had back surgery in Jan and undergoing PT rehab so in a lot of pain, does not want it done now. will defer colonoscopy till feeling better, FIT ordered.            Current medicines (including changes today)  Current Outpatient Prescriptions   Medication Sig Dispense Refill   • zolpidem (AMBIEN) 5 MG Tab Take 1 Tab by mouth at bedtime as needed for Sleep. 30 Tab 2   • [START ON 10/17/2017] oxycodone-acetaminophen (PERCOCET-10)  MG Tab Take 1-2 Tabs by mouth every four hours as needed for Severe Pain for up to 30 days. 180 Tab 0   • gabapentin (NEURONTIN) 300 MG Cap TAKE TWO CAPSULES BY MOUTH THREE TIMES DAILY 540 Cap 1   • losartan (COZAAR) 100 MG Tab TAKE ONE TABLET BY MOUTH ONCE DAILY 90 Tab 1   • amlodipine (NORVASC) 10 MG Tab TAKE ONE TABLET BY MOUTH ONCE DAILY 90  Tab 1   • metformin (GLUCOPHAGE) 1000 MG tablet TAKE ONE TABLET BY MOUTH TWICE DAILY WITH MEALS 180 Tab 3   • celecoxib (CELEBREX) 200 MG Cap TAKE ONE CAPSULE BY MOUTH TWICE DAILY 180 Cap 0   • sertraline (ZOLOFT) 50 MG Tab Take 1 Tab by mouth every day. 90 Tab 3   • Misc. Devices Misc One touch ultra test strips. Patient has DM type 2 and tests twice per day 100 Each 3   • Misc. Devices Misc One touch ultra lancets; patient has diabetes mellitus type 2 and test once per day 100 Device 3   • tizanidine (ZANAFLEX) 4 MG Tab Take 1 Tab by mouth every 6 hours as needed (back pain). 270 Tab 3   • glipiZIDE (GLUCOTROL) 5 MG Tab TAKE ONE TABLET BY MOUTH TWICE DAILY 180 Tab 3   • finasteride (PROSCAR) 5 MG Tab Take 1 Tab by mouth every day. 90 Tab 3   • trazodone (DESYREL) 100 MG Tab Take 100-200mg at night for sleep if needed. 180 Tab 3   • tamsulosin (FLOMAX) 0.4 MG capsule Take 2 Caps by mouth every day. 180 Cap 3   • nateglinide (STARLIX) 120 MG Tab TAKE ONE TABLET BY MOUTH THREE TIMES DAILY BEFORE MEAL(S) 270 Tab 3   • omeprazole (PRILOSEC) 20 MG delayed-release capsule TAKE ONE CAPSULE BY MOUTH ONCE DAILY 90 Cap 3   • nystatin (MYCOSTATIN) 473849 UNIT/GM Ointment Apply to decubitus ulcer 8 times per day in a thick layer 6 Tube 3   • Misc. Devices Misc CPAP supplies; fax to [facility] [phone]. 100 Device 3   • Multiple Vitamins-Minerals (MULTIVITAMIN PO) Take 1 Tab by mouth every day.       • Cholecalciferol (VITAMIN D) 2000 UNIT CAPS Take  by mouth every day.     • GLUCOSAMINE-CHONDROITIN-MSM PO Take 2 Tabs by mouth 2 Times a Day.     • aspirin 81 MG tablet Take 81 mg by mouth every day.     • Naloxone HCl 4 MG/0.1ML Liquid Spray 1 Spray in nose as needed (For severe sleepiness or difficulty breathing from possible overdose. Call 911 after administration.). 1 Each 0   • lansoprazole (PREVACID) 30 MG CAPSULE DELAYED RELEASE Take 1 Cap by mouth every day. 30 Cap 3   • fluticasone (FLONASE) 50 MCG/ACT nasal spray Spray 2  "Sprays in nose every day. Each Nostril 1 Bottle 3   • albuterol (VENTOLIN OR PROVENTIL) 108 (90 BASE) MCG/ACT AERS inhalation aerosol Inhale 2 Puffs by mouth every 6 hours as needed for Shortness of Breath. 8.5 g 3   • Fexofenadine HCl (ALLEGRA PO) Take  by mouth as needed.       No current facility-administered medications for this visit.     He  has a past medical history of Arthritis; Snoring; Diabetes; Pain (12-14-12); Hypertension; Heart burn; Insomnia (12/21/2011); GERD (gastroesophageal reflux disease) (12/21/2011); Psychiatric problem; Sleep apnea; Bronchitis (2009); Enlarged liver; Indigestion; Anesthesia; Nephrolithiasis (7/30/2014); Dysuria (7/30/2014); Body aches (11/19/2014); Anxiety; Obesity; and BRAIN (obstructive sleep apnea).    ROS  No chest pain, no shortness of breath, no abdominal pain       Objective:     Blood pressure 140/76, pulse 75, temperature 36.7 °C (98.1 °F), resp. rate 16, height 1.854 m (6' 1\"), weight 143.79 kg (317 lb), SpO2 94 %. Body mass index is 41.83 kg/(m^2).   Physical Exam:  Constitutional: Alert, no distress.  Skin: Warm, dry, good turgor, no rashes in visible areas.  Eye: Equal, round and reactive, conjunctiva clear, lids normal.  ENMT: Lips without lesions, good dentition, oropharynx clear.  Neck: Trachea midline, no masses, no thyromegaly. No cervical or supraclavicular lymphadenopathy  Respiratory: Unlabored respiratory effort, lungs clear to auscultation, no wheezes, no ronchi.  Cardiovascular: Normal S1, S2, no murmur, no edema.  Abdomen: Soft, non-tender, no masses, no hepatosplenomegaly.  Psych: Alert and oriented x3, normal affect and mood.        Assessment and Plan:   The following treatment plan was discussed    1. Chronic pain of left knee  Continue with PT    2. Lumbar stenosis with neurogenic claudication  Continue with PT and follow up with spine specialists  Percocet 10/325 180 pills each month refilled for 3 months. Plan to wean down when he is done with " PT.   Advised on risk of addiction, sedation, respiratory depression and death.   UDS,  reviewed and consistent.     3. Diabetes mellitus type 2 in obese (CMS-HCC)  Controlled. Continue current medication, due for labs.   - HEMOGLOBIN A1C; Future  - LIPID PROFILE; Future    4. Primary insomnia  Continue trazodone 200mg qhs, rx for ambien done today. Advised on risk of addiction, sedation, respiratory depression and death.       5. Screening for colon cancer,  H/O colonoscopy with polypectomy  Will refer to GI for colonoscopy when pain better controlled.   - OCCULT BLOOD FECES IMMUNOASSAY (FIT); Future    Followup: Return in about 3 months (around 11/17/2017) for pain med refills, DM, insomnia, HTN.

## 2017-08-17 NOTE — ASSESSMENT & PLAN NOTE
This is a chronic condition. Worse due to chronic pain. He states he has also always been a light sleeper.   He is on trazodone 100mg bid.   He also uses the CPAP every night.   Will rx ambien 5mg  Caution advised to patient reg respiratory depression especially since on opioid pain medication.

## 2017-08-17 NOTE — MR AVS SNAPSHOT
"        Jamey Riojas   2017 11:20 AM   Office Visit   MRN: [id number]    Department:  [facility]   Dept Phone:  [phone]    Description:  Male : 1960   Provider:  Ryann Street M.D.           Reason for Visit     Labs Only Results and med check      Allergies as of 2017     Allergen Noted Reactions    Penicillins 10/20/2009   Swelling    \"Tongue swells up\"    Phenergan [Promethazine Hcl] 10/15/2009       \"Jittery and hallucinating\"    Influenza Virus Vacc 2012       \"deathly ill\"      You were diagnosed with     Chronic pain of left knee   [673280]       Lumbar stenosis with neurogenic claudication   [026611]       Diabetes mellitus type 2 in obese (CMS-[facility])   [144180]       Primary insomnia   [554566]       Screening for colon cancer   [015762]         Vital Signs     Blood Pressure Pulse Temperature Respirations Height Weight    140/76 mmHg 75 36.7 °C (98.1 °F) 16 1.854 m (6' 1\") 143.79 kg (317 lb)    Body Mass Index Oxygen Saturation Smoking Status             41.83 kg/m2 94% Never Smoker          Basic Information     Date Of Birth Sex Race Ethnicity Preferred Language    1960 Male White Non- English      Your appointments     2017  1:40 PM   Established Patient with Ryann Street M.D.   [facility] ([place])    [address] [place] [id number] [phone]           You will be receiving a confirmation call a few days before your appointment from our automated call confirmation system.            2017 11:40 AM   New Patient with C Puneet   [facility] (--)    [address] [place] [address] A  [facility] [id number] [phone]           Please bring enclosed paperwork completed along with your insurance card and photo ID.              Problem List              ICD-10-CM Priority Class Noted - Resolved    Diabetes mellitus type 2 in obese (CMS-HCC) E11.9, E66.9   2011 - " Present    HTN (hypertension) I10 Medium  12/21/2011 - Present    Insomnia G47.00   12/21/2011 - Present    GERD (gastroesophageal reflux disease) K21.9   12/21/2011 - Present    Chronic back pain M54.9, G89.29   12/21/2011 - Present    Vitamin D deficiency disease E55.9 Low  1/12/2012 - Present    Sleep disorder breathing G47.30   8/30/2012 - Present    Chronic pain of left knee M25.562, G89.29   9/18/2012 - Present    Degeneration of cervical intervertebral disc M50.30 High  11/30/2012 - Present    Major depression (CMS-HCC) F32.9 Low  12/2/2012 - Present    Obstructive sleep apnea G47.33 Medium  12/2/2012 - Present    Iron deficiency anemia D50.9 Medium  12/2/2012 - Present    Degeneration of lumbar or lumbosacral intervertebral disc M51.37   12/30/2012 - Present    BPH (benign prostatic hyperplasia) N40.0   12/20/2013 - Present    Nephrolithiasis N20.0   7/30/2014 - Present    Dysuria R30.0   7/30/2014 - Present    Body aches R52   11/19/2014 - Present    Sinusitis J32.9   4/21/2015 - Present    Anemia of unknown etiology D64.9   5/21/2015 - Present    Rib pain on left side R07.81   10/22/2015 - Present    Lumbar stenosis with neurogenic claudication M48.06   12/31/2015 - Present    Chronic use of opiate drugs therapeutic purposes Z79.891   8/11/2016 - Present      Health Maintenance        Date Due Completion Dates    IMM HEP B VACCINE (1 of 3 - Primary Series) 1960 ---    RETINAL SCREENING 6/12/1980 ---    COLON CANCER SCREENING ANNUAL FIT 6/12/2012 ---    DIABETES MONOFILAMENT / LE EXAM 1/12/2013 1/12/2012 (N/S)    Override on 1/12/2012: (N/S)    COLONOSCOPY 1/1/2017 1/1/2012 (Done)    Override on 1/1/2012: Done (GI consult- polyps)    FASTING LIPID PROFILE 8/9/2017 8/9/2016, 5/18/2015, 9/25/2013, 4/25/2013, 1/5/2012    IMM INFLUENZA (1) 9/1/2017 ---    A1C SCREENING 10/25/2017 4/25/2017, 1/25/2017, 8/9/2016, 12/31/2015, 5/18/2015, 12/30/2013, 9/25/2013, 4/25/2013, 12/2/2012, 10/20/2012, 7/7/2012,  2/2/2012, 1/5/2012, 6/9/2010, 9/4/2007    SERUM CREATININE 6/8/2018 6/8/2017, 4/25/2017, 1/25/2017, 8/9/2016, 12/31/2015, 12/11/2015, 5/18/2015, 12/30/2013, 9/25/2013, 4/25/2013, 1/3/2013, 1/2/2013, 12/5/2012, 12/3/2012, 12/2/2012, 11/27/2012, 10/20/2012, 9/10/2012, 7/7/2012, 4/25/2012, 4/23/2012, 2/2/2012, 1/5/2012, 11/15/2010, 6/9/2010, 5/10/2010, 10/15/2009, 9/10/2007, 9/4/2007    URINE ACR / MICROALBUMIN 8/3/2018 8/3/2017, 8/9/2016, 5/19/2015, 2/2/2012    IMM DTaP/Tdap/Td Vaccine (2 - Td) 11/8/2025 11/8/2015            Current Immunizations     Pneumococcal polysaccharide vaccine (PPSV-23) 12/30/2005    Tdap Vaccine 11/8/2015      Below and/or attached are the medications your provider expects you to take. Review all of your home medications and newly ordered medications with your provider and/or pharmacist. Follow medication instructions as directed by your provider and/or pharmacist. Please keep your medication list with you and share with your provider. Update the information when medications are discontinued, doses are changed, or new medications (including over-the-counter products) are added; and carry medication information at all times in the event of emergency situations     Allergies:  PENICILLINS - Swelling     PHENERGAN - (reactions not documented)     INFLUENZA VIRUS VACC - (reactions not documented)               Medications  Valid as of: August 17, 2017 - 11:54 AM    Generic Name Brand Name Tablet Size Instructions for use    Albuterol Sulfate (Aero Soln) albuterol 108 (90 Base) MCG/ACT Inhale 2 Puffs by mouth every 6 hours as needed for Shortness of Breath.        AmLODIPine Besylate (Tab) NORVASC 10 MG TAKE ONE TABLET BY MOUTH ONCE DAILY        Aspirin (Tab) aspirin 81 MG Take 81 mg by mouth every day.        Celecoxib (Cap) CELEBREX 200 MG TAKE ONE CAPSULE BY MOUTH TWICE DAILY        Cholecalciferol (Cap) Vitamin D 2000 units Take  by mouth every day.        Fexofenadine HCl   Take  by mouth as  needed.        Finasteride (Tab) PROSCAR 5 MG Take 1 Tab by mouth every day.        Fluticasone Propionate (Suspension) FLONASE 50 MCG/ACT Spray 2 Sprays in nose every day. Each Nostril        Gabapentin (Cap) NEURONTIN 300 MG TAKE TWO CAPSULES BY MOUTH THREE TIMES DAILY        GlipiZIDE (Tab) GLUCOTROL 5 MG TAKE ONE TABLET BY MOUTH TWICE DAILY        Glucosamine-Chondroitin-MSM   Take 2 Tabs by mouth 2 Times a Day.        Lansoprazole (CAPSULE DELAYED RELEASE) PREVACID 30 MG Take 1 Cap by mouth every day.        Losartan Potassium (Tab) COZAAR 100 MG TAKE ONE TABLET BY MOUTH ONCE DAILY        MetFORMIN HCl (Tab) GLUCOPHAGE 1000 MG TAKE ONE TABLET BY MOUTH TWICE DAILY WITH MEALS        Misc. Devices (Misc) Misc. Devices  CPAP supplies; fax to [facility] [phone].        Misc. Devices (Misc) Misc. Devices  One touch ultra test strips. Patient has DM type 2 and tests twice per day        Misc. Devices (Misc) Misc. Devices  One touch ultra lancets; patient has diabetes mellitus type 2 and test once per day        Multiple Vitamins-Minerals   Take 1 Tab by mouth every day.          Naloxone HCl (Liquid) Naloxone HCl 4 MG/0.1ML Spray 1 Spray in nose as needed (For severe sleepiness or difficulty breathing from possible overdose. Call 911 after administration.).        Nateglinide (Tab) STARLIX 120 MG TAKE ONE TABLET BY MOUTH THREE TIMES DAILY BEFORE MEAL(S)        Nystatin (Ointment) MYCOSTATIN 187356 UNIT/GM Apply to decubitus ulcer 8 times per day in a thick layer        Omeprazole (CAPSULE DELAYED RELEASE) PRILOSEC 20 MG TAKE ONE CAPSULE BY MOUTH ONCE DAILY        Oxycodone-Acetaminophen (Tab) PERCOCET-10  MG Take 1-2 Tabs by mouth every four hours as needed for Severe Pain for up to 30 days.        Sertraline HCl (Tab) ZOLOFT 50 MG Take 1 Tab by mouth every day.        Tamsulosin HCl (Cap) FLOMAX 0.4 MG Take 2 Caps by mouth every day.        TiZANidine HCl (Tab) ZANAFLEX 4 MG Take 1 Tab by mouth every 6 hours as  needed (back pain).        TraZODone HCl (Tab) DESYREL 100 MG Take 100-200mg at night for sleep if needed.        Zolpidem Tartrate (Tab) AMBIEN 5 MG Take 1 Tab by mouth at bedtime as needed for Sleep.        .                 Medicines prescribed today were sent to:     [facility] [id number]  [place], [place] - [address] [facility]    [address] [place] [address]    Phone: [phone] Fax: [phone]    Open 24 Hours?: No    [facility] [address] [place], [place] - [address] [facility] [facility]    [address] [facility] [address]    Phone: [phone] Fax: [phone]    Open 24 Hours?: No      Medication refill instructions:       If your prescription bottle indicates you have medication refills left, it is not necessary to call your provider’s office. Please contact your pharmacy and they will refill your medication.    If your prescription bottle indicates you do not have any refills left, you may request refills at any time through one of the following ways: The online [employer] system (except Urgent Care), by calling your provider’s office, or by asking your pharmacy to contact your provider’s office with a refill request. Medication refills are processed only during regular business hours and may not be available until the next business day. Your provider may request additional information or to have a follow-up visit with you prior to refilling your medication.   *Please Note: Medication refills are assigned a new Rx number when refilled electronically. Your pharmacy may indicate that no refills were authorized even though a new prescription for the same medication is available at the pharmacy. Please request the medicine by name with the pharmacy before contacting your provider for a refill.        Your To Do List     Future Labs/Procedures Complete By Expires    HEMOGLOBIN A1C  As directed 8/18/2018    LIPID PROFILE  As directed 8/18/2018    OCCULT  BLOOD FECES IMMUNOASSAY (FIT)  As directed 8/17/2018      Other Notes About Your Plan     Last UDS:  5/21/15  DR ALCARAZ  Contolled Substance agreement signed: 5/21/15  DR KIERAN Bridges Access Code: [email or web address]  Expires: 8/21/2017  4:12 AM    MyChart  A secure, online tool to manage your health information     [employer] obiwon® is a secure, online tool that connects you to your personalized health information from the privacy of your home -- day or night - making it very easy for you to manage your healthcare. Once the activation process is completed, you can even access your medical information using the obiwon piotr, which is available for free in the Apple Piotr store or Google Play store.     obiwon provides the following levels of access (as shown below):   My Chart Features   [facility] Doctor [facility]  Urgent  Care Non-[facility]  Primary Care  Doctor   Email your healthcare team securely and privately 24/7 X X X    Manage appointments: schedule your next appointment; view details of past/upcoming appointments X      Request prescription refills. X      View recent personal medical records, including lab and immunizations X X X X   View health record, including health history, allergies, medications X X X X   Read reports about your outpatient visits, procedures, consult and ER notes X X X X   See your discharge summary, which is a recap of your hospital and/or ER visit that includes your diagnosis, lab results, and care plan. X X       How to register for obiwon:  1. Go to  [email or web address].  2. Click on the Sign Up Now box, which takes you to the New Member Sign Up page. You will need to provide the following information:  a. Enter your obiwon Access Code exactly as it appears at the top of this page. (You will not need to use this code after you’ve completed the sign-up process. If you do not sign up before the expiration date, you must request a new  code.)   b. Enter your date of birth.   c. Enter your home email address.   d. Click Submit, and follow the next screen’s instructions.  3. Create a BigEvidence ID. This will be your BigEvidence login ID and cannot be changed, so think of one that is secure and easy to remember.  4. Create a H[employer]Bosst password. You can change your password at any time.  5. Enter your Password Reset Question and Answer. This can be used at a later time if you forget your password.   6. Enter your e-mail address. This allows you to receive e-mail notifications when new information is available in BigEvidence.  7. Click Sign Up. You can now view your health information.    For assistance activating your BigEvidence account, call [phone]

## 2017-08-17 NOTE — ASSESSMENT & PLAN NOTE
Last A1c in 7.7  Due for recheck  Normal microalbumin, LDL 59 without statin, due for recheck  Continues on aspirin and losartan.   He takes metformin 1000 bid and glipizide 5 mg  He is working on weight loss.

## 2017-09-07 ENCOUNTER — HOSPITAL ENCOUNTER (OUTPATIENT)
Facility: MEDICAL CENTER | Age: 57
End: 2017-09-07
Attending: FAMILY MEDICINE
Payer: COMMERCIAL

## 2017-09-07 PROCEDURE — 82274 ASSAY TEST FOR BLOOD FECAL: CPT

## 2017-09-10 DIAGNOSIS — Z12.11 SCREENING FOR COLON CANCER: ICD-10-CM

## 2017-09-10 LAB — HEMOCCULT STL QL IA: NEGATIVE

## 2017-09-18 ENCOUNTER — TELEPHONE (OUTPATIENT)
Dept: MEDICAL GROUP | Facility: PHYSICIAN GROUP | Age: 57
End: 2017-09-18

## 2017-09-18 NOTE — TELEPHONE ENCOUNTER
[facility] called requesting an order for 4 wheeled walker with seat, heavy duty. Order filled out, pending provider signature.

## 2017-10-10 ENCOUNTER — TELEPHONE (OUTPATIENT)
Dept: MEDICAL GROUP | Facility: PHYSICIAN GROUP | Age: 57
End: 2017-10-10

## 2017-10-25 DIAGNOSIS — M48.062 LUMBAR STENOSIS WITH NEUROGENIC CLAUDICATION: ICD-10-CM

## 2017-10-25 NOTE — TELEPHONE ENCOUNTER
Was the patient seen in the last year in this department? Yes     Does patient have an active prescription for medications requested? No     Received Request Via: Pharmacy      Pt met protocol?: Yes    LAST OV 08/17/2017

## 2017-10-30 RX ORDER — CELECOXIB 200 MG/1
CAPSULE ORAL
Qty: 180 CAP | Refills: 0 | Status: SHIPPED | OUTPATIENT
Start: 2017-10-30 | End: 2018-02-16 | Stop reason: SDUPTHER

## 2017-11-03 ENCOUNTER — HOSPITAL ENCOUNTER (OUTPATIENT)
Dept: LAB | Facility: MEDICAL CENTER | Age: 57
End: 2017-11-03
Attending: FAMILY MEDICINE
Payer: COMMERCIAL

## 2017-11-03 DIAGNOSIS — E66.9 DIABETES MELLITUS TYPE 2 IN OBESE (HCC): ICD-10-CM

## 2017-11-03 DIAGNOSIS — E11.69 DIABETES MELLITUS TYPE 2 IN OBESE (HCC): ICD-10-CM

## 2017-11-03 LAB
CHOLEST SERPL-MCNC: 134 MG/DL (ref 100–199)
EST. AVERAGE GLUCOSE BLD GHB EST-MCNC: 183 MG/DL
HBA1C MFR BLD: 8 % (ref 0–5.6)
HDLC SERPL-MCNC: 35 MG/DL
LDLC SERPL CALC-MCNC: 47 MG/DL
TRIGL SERPL-MCNC: 261 MG/DL (ref 0–149)

## 2017-11-03 PROCEDURE — 83036 HEMOGLOBIN GLYCOSYLATED A1C: CPT

## 2017-11-03 PROCEDURE — 80061 LIPID PANEL: CPT

## 2017-11-03 PROCEDURE — 36415 COLL VENOUS BLD VENIPUNCTURE: CPT

## 2017-11-07 ENCOUNTER — OFFICE VISIT (OUTPATIENT)
Dept: MEDICAL GROUP | Facility: PHYSICIAN GROUP | Age: 57
End: 2017-11-07
Payer: COMMERCIAL

## 2017-11-07 VITALS
DIASTOLIC BLOOD PRESSURE: 80 MMHG | BODY MASS INDEX: 41.75 KG/M2 | OXYGEN SATURATION: 95 % | HEART RATE: 71 BPM | HEIGHT: 73 IN | SYSTOLIC BLOOD PRESSURE: 132 MMHG | RESPIRATION RATE: 14 BRPM | TEMPERATURE: 98.2 F | WEIGHT: 315 LBS

## 2017-11-07 DIAGNOSIS — E11.69 DIABETES MELLITUS TYPE 2 IN OBESE (HCC): ICD-10-CM

## 2017-11-07 DIAGNOSIS — I10 ESSENTIAL HYPERTENSION: ICD-10-CM

## 2017-11-07 DIAGNOSIS — Z86.010 H/O COLONOSCOPY WITH POLYPECTOMY: ICD-10-CM

## 2017-11-07 DIAGNOSIS — Z79.891 CHRONIC USE OF OPIATE DRUGS THERAPEUTIC PURPOSES: ICD-10-CM

## 2017-11-07 DIAGNOSIS — Z98.890 H/O COLONOSCOPY WITH POLYPECTOMY: ICD-10-CM

## 2017-11-07 DIAGNOSIS — E66.9 DIABETES MELLITUS TYPE 2 IN OBESE (HCC): ICD-10-CM

## 2017-11-07 PROCEDURE — 99214 OFFICE O/P EST MOD 30 MIN: CPT | Performed by: FAMILY MEDICINE

## 2017-11-07 RX ORDER — GLIPIZIDE 10 MG/1
10 TABLET ORAL 2 TIMES DAILY
Qty: 180 TAB | Refills: 3 | Status: SHIPPED | OUTPATIENT
Start: 2017-11-07 | End: 2018-11-07 | Stop reason: SDUPTHER

## 2017-11-07 NOTE — ASSESSMENT & PLAN NOTE
He is due for 5 year colonoscopy as last one had a polyp removed. He had surgery and PT rehab and unable to get colonoscopy done now.   FIT was normal this year

## 2017-11-07 NOTE — ASSESSMENT & PLAN NOTE
Chronic condition, well controlled on losartan and amlodipine. Dhe has no vision changes, no chest pain. Uses cpap at night.

## 2017-11-07 NOTE — ASSESSMENT & PLAN NOTE
Chronic condition due to back pain, lumbar radiculopathy and knee pain in left due to meniscus injury a year ago. He had many surgeries on his back the last one a year ago. Fall a year ago caused injury to site of prior surgery.   He is currently on percocet 10/325 mg up to 6 times a day.   Currently he is under [facility] and has pain meds filled by them  He also takes celebrex and gabapentin and tizanidine.

## 2017-11-07 NOTE — ASSESSMENT & PLAN NOTE
His A1c has gradually increased from 7 to now 8  He admits he did overindulge on halloween candy.   His LDL is 47   He takes losartan, metformin 1000bid, glipizide 5 bid will increase this to 10 bid, asa 81 mg

## 2017-11-08 ENCOUNTER — SLEEP CENTER VISIT (OUTPATIENT)
Dept: SLEEP MEDICINE | Facility: MEDICAL CENTER | Age: 57
End: 2017-11-08
Payer: COMMERCIAL

## 2017-11-08 VITALS
WEIGHT: 315 LBS | TEMPERATURE: 97.9 F | HEIGHT: 74 IN | HEART RATE: 67 BPM | OXYGEN SATURATION: 94 % | RESPIRATION RATE: 16 BRPM | DIASTOLIC BLOOD PRESSURE: 70 MMHG | SYSTOLIC BLOOD PRESSURE: 140 MMHG | BODY MASS INDEX: 40.43 KG/M2

## 2017-11-08 DIAGNOSIS — E66.9 DIABETES MELLITUS TYPE 2 IN OBESE (HCC): ICD-10-CM

## 2017-11-08 DIAGNOSIS — G47.33 OBSTRUCTIVE SLEEP APNEA: ICD-10-CM

## 2017-11-08 DIAGNOSIS — F51.01 PRIMARY INSOMNIA: ICD-10-CM

## 2017-11-08 DIAGNOSIS — M48.062 LUMBAR STENOSIS WITH NEUROGENIC CLAUDICATION: ICD-10-CM

## 2017-11-08 DIAGNOSIS — E11.69 DIABETES MELLITUS TYPE 2 IN OBESE (HCC): ICD-10-CM

## 2017-11-08 PROCEDURE — 99214 OFFICE O/P EST MOD 30 MIN: CPT | Performed by: INTERNAL MEDICINE

## 2017-11-08 NOTE — PROGRESS NOTES
Jamey Riojas is a 57 y.o. male here for sleep apnea on CPAP. Patient was referred by his primary care doctor.    History of Present Illness:      This patient was previously seen in our office over 2 years ago. He is had a very difficult two years, back surgery, then a fall, then required additional intervention. 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    He comes in with his wife, she has sleep apnea as well. His downloaded compliance data look very good, he utilizes his machine over 7 hours per night and apnea-hypopnea index is reduced to 2.5 with treatment. He sleeps well, wears his equipment faithfully,, he has good response.    However, the machine which is over 3 years old is now causing problems with noise and clunking, he is going to his durable medical equipment company to have this replaced. I put in a new prescription for his auto CPAP, range 13-18, appears to set around 15.    We will see him back in 6 months, sooner for problems.      Constitutional ROS: No unexpected change in weight, No unexplained fevers  Eyes: No change in vision or blurring or double vision  Mouth/Throat ROS: No sore throat, No recent change in voice or hoarseness  Pulmonary ROS: See present history for pertinent positives  Cardiovascular ROS: No chest pain to suggest acute coronary syndrome  Gastrointestinal ROS: No abdominal pain to suggest peptic disease  Musculoskeletal/Extremities ROS: Back pain, uses support to walk, multiple surgeries recently  Hematologic/Lymphatic ROS: No easy bleeding or unusual lymph node swelling  Neurologic ROS: No new or unusual weakness  Psychiatric ROS: No hallucinations  Allergic/Immunologic: No  urticaria or allergic rash      Current Outpatient Prescriptions   Medication Sig Dispense Refill   • glipiZIDE (GLUCOTROL) 10 MG Tab Take 1 Tab by mouth 2 times a day. 180 Tab 3   • celecoxib (CELEBREX) 200 MG Cap TAKE ONE CAPSULE BY MOUTH TWICE DAILY 180 Cap 0   • zolpidem  (AMBIEN) 5 MG Tab Take 1 Tab by mouth at bedtime as needed for Sleep. 30 Tab 2   • oxycodone-acetaminophen (PERCOCET-10)  MG Tab Take 1-2 Tabs by mouth every four hours as needed for Severe Pain for up to 30 days. 180 Tab 0   • gabapentin (NEURONTIN) 300 MG Cap TAKE TWO CAPSULES BY MOUTH THREE TIMES DAILY 540 Cap 1   • losartan (COZAAR) 100 MG Tab TAKE ONE TABLET BY MOUTH ONCE DAILY 90 Tab 1   • amlodipine (NORVASC) 10 MG Tab TAKE ONE TABLET BY MOUTH ONCE DAILY 90 Tab 1   • metformin (GLUCOPHAGE) 1000 MG tablet TAKE ONE TABLET BY MOUTH TWICE DAILY WITH MEALS 180 Tab 3   • Naloxone HCl 4 MG/0.1ML Liquid Spray 1 Spray in nose as needed (For severe sleepiness or difficulty breathing from possible overdose. Call 911 after administration.). 1 Each 0   • sertraline (ZOLOFT) 50 MG Tab Take 1 Tab by mouth every day. 90 Tab 3   • Misc. Devices Misc One touch ultra test strips. Patient has DM type 2 and tests twice per day 100 Each 3   • Misc. Devices Misc One touch ultra lancets; patient has diabetes mellitus type 2 and test once per day 100 Device 3   • tizanidine (ZANAFLEX) 4 MG Tab Take 1 Tab by mouth every 6 hours as needed (back pain). 270 Tab 3   • finasteride (PROSCAR) 5 MG Tab Take 1 Tab by mouth every day. 90 Tab 3   • trazodone (DESYREL) 100 MG Tab Take 100-200mg at night for sleep if needed. 180 Tab 3   • tamsulosin (FLOMAX) 0.4 MG capsule Take 2 Caps by mouth every day. 180 Cap 3   • nateglinide (STARLIX) 120 MG Tab TAKE ONE TABLET BY MOUTH THREE TIMES DAILY BEFORE MEAL(S) 270 Tab 3   • lansoprazole (PREVACID) 30 MG CAPSULE DELAYED RELEASE Take 1 Cap by mouth every day. 30 Cap 3   • omeprazole (PRILOSEC) 20 MG delayed-release capsule TAKE ONE CAPSULE BY MOUTH ONCE DAILY 90 Cap 3   • nystatin (MYCOSTATIN) 578169 UNIT/GM Ointment Apply to decubitus ulcer 8 times per day in a thick layer 6 Tube 3   • Misc. Devices Misc CPAP supplies; fax to [facility] [phone]. 100 Device 3   • Multiple Vitamins-Minerals  (MULTIVITAMIN PO) Take 1 Tab by mouth every day.       • Fexofenadine HCl (ALLEGRA PO) Take  by mouth as needed.     • Cholecalciferol (VITAMIN D) 2000 UNIT CAPS Take  by mouth every day.     • GLUCOSAMINE-CHONDROITIN-MSM PO Take 2 Tabs by mouth 2 Times a Day.     • aspirin 81 MG tablet Take 81 mg by mouth every day.     • fluticasone (FLONASE) 50 MCG/ACT nasal spray Spray 2 Sprays in nose every day. Each Nostril 1 Bottle 3   • albuterol (VENTOLIN OR PROVENTIL) 108 (90 BASE) MCG/ACT AERS inhalation aerosol Inhale 2 Puffs by mouth every 6 hours as needed for Shortness of Breath. 8.5 g 3     No current facility-administered medications for this visit.        Social History   Substance Use Topics   • Smoking status: Never Smoker   • Smokeless tobacco: Never Used   • Alcohol use No        Past Medical History:   Diagnosis Date   • Anesthesia     mother has hard time waking up   • Anxiety    • Arthritis     knees and shoulder joints and hands   • Body aches 11/19/2014   • Bronchitis 2009   • Chickenpox    • Diabetes     oral medication and diet   • Dysuria 7/30/2014   • Enlarged liver    • GERD (gastroesophageal reflux disease) 12/21/2011   • Heart burn    • Hypertension    • Indigestion    • Influenza    • Insomnia 12/21/2011   • Nephrolithiasis 7/30/2014    This is a recurring problem. Patient states that he was evaluated for a kidney stone 2012. CT scan showed a stone that was not obstructing at that time. He has been having increasing right-sided back pain for approximately the last month. He has not had any fever or chills.   • Obesity    • BRAIN (obstructive sleep apnea)    • Pain 12-14-12    lower back,neck, 5/10   • Psychiatric problem     depression   • Sleep apnea     CPAP, O2 @ HS 2.5 liters   • Snoring        Past Surgical History:   Procedure Laterality Date   • LUMBAR FUSION POSTERIOR  12/30/2012    Performed by Mohsen Chan M.D. at SURGERY [facility]   • CERVICAL DISK AND FUSION ANTERIOR  11/30/2012  "   Performed by Benjamin Kumar M.D. at SURGERY [facility]   • HARDWARE REMOVAL ORTHO  11/30/2012    Performed by Benjamin Kumar M.D. at SURGERY [facility]   • KNEE ARTHROSCOPY  9/18/2012    Performed by Jamse Francis M.D. at SURGERY [facility]   • MEDIAL MENISCECTOMY  9/18/2012    Performed by James Francis M.D. at SURGERY [facility]   • DEBRIDEMENT  9/18/2012    Performed by James Francis M.D. at SURGERY [facility]   • LUMBAR FUSION POSTERIOR  6/18/2010    Performed by BENJAMIN KUMAR at SURGERY [facility]   • LUMBAR DECOMPRESSION  6/18/2010    Performed by BENJAMIN KUMAR at SURGERY [facility]   • KNEE ARTHROSCOPY  10/20/2009    Performed by JAMES FRANCIS at SURGERY [facility]   • MEDIAL MENISCECTOMY  10/20/2009    Performed by JAMES FRANCIS at SURGERY [facility]   • CERVICAL FUSION POSTERIOR  9/2007    c -3-4-5    • CHOLECYSTECTOMY  1984    open   • FOOT SURGERY      left  Farrar's neuroma and heel spur       Allergies: Penicillins; Phenergan [promethazine hcl]; and Influenza virus vacc    Family History   Problem Relation Age of Onset   • Other Mother      osteopenia   • Hypertension Mother    • Psychiatry Mother    • Other Father      brain tumor   • Heart Attack Maternal Grandfather    • Sleep Apnea Neg Hx        Physical Examination    Vitals:    11/08/17 1045   Height: 1.88 m (6' 2\")   Weight: (!) 142.9 kg (315 lb)   Weight % change since last entry.: 0 %   BP: 140/70   Pulse: 67   BMI (Calculated): 40.44   Resp: 16   Temp: 36.6 °C (97.9 °F)   O2 sat % room air: 94 %   Neck circumference: 19.75       General Appearance: alert, no distress  Skin: Skin color, texture, turgor normal. No rashes or lesions.  Eyes: negative  Oropharynx: Lips, mucosa, and tongue normal. Teeth and gums normal. Oropharynx moist and without lesion  Lungs: positive findings:Clear by exam  Heart: negative. RRR without murmur, gallop, or rubs.  No ectopy.  Abdomen: " Abdomen soft, non-tender. . No masses,  No organomegaly  Extremities:  No deformities, edema, or skin discoloration  Joints: No acute arthritis  Peripheral Pulses:perfused  Neurologic: intact grossly  Protuberant abdomen, uses a support when walking    II (soft palate, uvula, fauces visible)    Imaging: None presently    PFTS: None      Assessment and Plan  1. Obstructive sleep apnea  - DME CPAP    2. Lumbar stenosis with neurogenic claudication    3. Diabetes mellitus type 2 in obese (CMS-HCC)    4. Primary insomnia  Uses trazodone    This patient was previously seen in our office over 2 years ago. He is had a very difficult two years, back surgery, then a fall, then required additional intervention. 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    He comes in with his wife, she has sleep apnea as well. His downloaded compliance data look very good, he utilizes his machine over 7 hours per night and apnea-hypopnea index is reduced to 2.5 with treatment. He sleeps well, wears his equipment faithfully,, he has good response.    However, the machine which is over 3 years old is now causing problems with noise and clunking, he is going to his durable medical equipment company to have this replaced. I put in a new prescription for his auto CPAP, range 13-18, appears to set around 15.    We will see him back in 6 months, sooner for problems.    Followup Return in about 6 months (around 5/8/2018) for follow up visit with sleep provider.

## 2017-11-08 NOTE — PATIENT INSTRUCTIONS
This patient was previously seen in our office over 2 years ago. He is had a very difficult two years, back surgery, then a fall, then required additional intervention. 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    He comes in with his wife, she has sleep apnea as well. His downloaded compliance data look very good, he utilizes his machine over 7 hours per night and apnea-hypopnea index is reduced to 2.5 with treatment. He sleeps well, wears his equipment faithfully,, he has good response.    However, the machine which is over 3 years old is now causing problems with noise and clunking, he is going to his durable medical equipment company to have this replaced. I put in a new prescription for his auto CPAP, range 13-18, appears to set around 15.    We will see him back in 6 months, sooner for problems.

## 2017-11-08 NOTE — PROGRESS NOTES
Subjective:   Jamey Riojas is a 57 y.o. male here today for evaluation and management of:     Diabetes mellitus type 2 in obese  His A1c has gradually increased from 7 to now 8  He admits he did overindulge on halloween candy.   His LDL is 47   He takes losartan, metformin 1000bid, glipizide 5 bid will increase this to 10 bid, asa 81 mg        Chronic use of opiate drugs therapeutic purposes  Chronic condition due to back pain, lumbar radiculopathy and knee pain in left due to meniscus injury a year ago. He had many surgeries on his back the last one a year ago. Fall a year ago caused injury to site of prior surgery.   He is currently on percocet 10/325 mg up to 6 times a day.   Currently he is under [facility] and has pain meds filled by them  He also takes celebrex and gabapentin and tizanidine.        HTN (hypertension)  Chronic condition, well controlled on losartan and amlodipine. Dhe has no vision changes, no chest pain. Uses cpap at night.     H/O colonoscopy with polypectomy  He is due for 5 year colonoscopy as last one had a polyp removed. He had surgery and PT rehab and unable to get colonoscopy done now.   FIT was normal this year         Current medicines (including changes today)  Current Outpatient Prescriptions   Medication Sig Dispense Refill   • glipiZIDE (GLUCOTROL) 10 MG Tab Take 1 Tab by mouth 2 times a day. 180 Tab 3   • celecoxib (CELEBREX) 200 MG Cap TAKE ONE CAPSULE BY MOUTH TWICE DAILY 180 Cap 0   • oxycodone-acetaminophen (PERCOCET-10)  MG Tab Take 1-2 Tabs by mouth every four hours as needed for Severe Pain for up to 30 days. 180 Tab 0   • gabapentin (NEURONTIN) 300 MG Cap TAKE TWO CAPSULES BY MOUTH THREE TIMES DAILY 540 Cap 1   • losartan (COZAAR) 100 MG Tab TAKE ONE TABLET BY MOUTH ONCE DAILY 90 Tab 1   • amlodipine (NORVASC) 10 MG Tab TAKE ONE TABLET BY MOUTH ONCE DAILY 90 Tab 1   • metformin (GLUCOPHAGE) 1000 MG tablet TAKE ONE TABLET BY MOUTH TWICE DAILY WITH  MEALS 180 Tab 3   • Naloxone HCl 4 MG/0.1ML Liquid Spray 1 Spray in nose as needed (For severe sleepiness or difficulty breathing from possible overdose. Call 911 after administration.). 1 Each 0   • sertraline (ZOLOFT) 50 MG Tab Take 1 Tab by mouth every day. 90 Tab 3   • Misc. Devices Misc One touch ultra test strips. Patient has DM type 2 and tests twice per day 100 Each 3   • Misc. Devices Misc One touch ultra lancets; patient has diabetes mellitus type 2 and test once per day 100 Device 3   • tizanidine (ZANAFLEX) 4 MG Tab Take 1 Tab by mouth every 6 hours as needed (back pain). 270 Tab 3   • finasteride (PROSCAR) 5 MG Tab Take 1 Tab by mouth every day. 90 Tab 3   • trazodone (DESYREL) 100 MG Tab Take 100-200mg at night for sleep if needed. 180 Tab 3   • tamsulosin (FLOMAX) 0.4 MG capsule Take 2 Caps by mouth every day. 180 Cap 3   • nateglinide (STARLIX) 120 MG Tab TAKE ONE TABLET BY MOUTH THREE TIMES DAILY BEFORE MEAL(S) 270 Tab 3   • lansoprazole (PREVACID) 30 MG CAPSULE DELAYED RELEASE Take 1 Cap by mouth every day. 30 Cap 3   • omeprazole (PRILOSEC) 20 MG delayed-release capsule TAKE ONE CAPSULE BY MOUTH ONCE DAILY 90 Cap 3   • nystatin (MYCOSTATIN) 653616 UNIT/GM Ointment Apply to decubitus ulcer 8 times per day in a thick layer 6 Tube 3   • Misc. Devices Misc CPAP supplies; fax to [facility] [phone]. 100 Device 3   • Multiple Vitamins-Minerals (MULTIVITAMIN PO) Take 1 Tab by mouth every day.       • Cholecalciferol (VITAMIN D) 2000 UNIT CAPS Take  by mouth every day.     • GLUCOSAMINE-CHONDROITIN-MSM PO Take 2 Tabs by mouth 2 Times a Day.     • aspirin 81 MG tablet Take 81 mg by mouth every day.     • zolpidem (AMBIEN) 5 MG Tab Take 1 Tab by mouth at bedtime as needed for Sleep. 30 Tab 2   • fluticasone (FLONASE) 50 MCG/ACT nasal spray Spray 2 Sprays in nose every day. Each Nostril 1 Bottle 3   • albuterol (VENTOLIN OR PROVENTIL) 108 (90 BASE) MCG/ACT AERS inhalation aerosol Inhale 2 Puffs by mouth every  "6 hours as needed for Shortness of Breath. 8.5 g 3   • Fexofenadine HCl (ALLEGRA PO) Take  by mouth as needed.       No current facility-administered medications for this visit.      He  has a past medical history of Anesthesia; Anxiety; Arthritis; Body aches (11/19/2014); Bronchitis (2009); Chickenpox; Diabetes; Dysuria (7/30/2014); Enlarged liver; GERD (gastroesophageal reflux disease) (12/21/2011); Heart burn; Hypertension; Indigestion; Influenza; Insomnia (12/21/2011); Nephrolithiasis (7/30/2014); Obesity; BRAIN (obstructive sleep apnea); Pain (12-14-12); Psychiatric problem; Sleep apnea; and Snoring.    ROS  No chest pain, no shortness of breath, no abdominal pain       Objective:     Blood pressure 132/80, pulse 71, temperature 36.8 °C (98.2 °F), resp. rate 14, height 1.854 m (6' 1\"), weight (!) 142.9 kg (315 lb), SpO2 95 %. Body mass index is 41.56 kg/m².   Physical Exam:  Constitutional: Alert, no distress.  Skin: Warm, dry, good turgor, no rashes in visible areas.  Eye: Equal, round and reactive, conjunctiva clear, lids normal.  ENMT: Lips without lesions, good dentition, oropharynx clear.  Neck: Trachea midline, no masses, no thyromegaly. No cervical or supraclavicular lymphadenopathy  Respiratory: Unlabored respiratory effort, lungs clear to auscultation, no wheezes, no ronchi.  Cardiovascular: Normal S1, S2, no murmur, no edema.  Abdomen: Soft, non-tender, no masses, no hepatosplenomegaly.  Psych: Alert and oriented x3, normal affect and mood.        Assessment and Plan:   The following treatment plan was discussed    1. Diabetes mellitus type 2 in obese (CMS-HCC)  Worsening. A1c increased to 8. Continue metformin 1000 twice a day, increased glipizide to 10 mg twice a day. Recheck A1c in 3 months, continue aspirin and losartan  - HEMOGLOBIN A1C; Future  - RENAL FUNCTION PANEL; Future    2. Chronic use of opiate drugs therapeutic purposes  Follow-up with pain management    3. Essential hypertension  Chronic " condition. Well-controlled    4. H/O colonoscopy with polypectomy  Normal physical. Reviewed. Patient will schedule colonoscopy, which is due.      Followup: Return in about 3 months (around 2/7/2018) for DM, HTN, .

## 2017-11-17 DIAGNOSIS — N40.1 BENIGN NODULAR PROSTATIC HYPERPLASIA WITH LOWER URINARY TRACT SYMPTOMS: ICD-10-CM

## 2017-11-17 DIAGNOSIS — F51.01 PRIMARY INSOMNIA: ICD-10-CM

## 2017-11-17 DIAGNOSIS — Z12.5 SCREENING FOR PROSTATE CANCER: ICD-10-CM

## 2017-11-20 RX ORDER — TRAZODONE HYDROCHLORIDE 100 MG/1
TABLET ORAL
Qty: 180 TAB | Refills: 3 | Status: SHIPPED | OUTPATIENT
Start: 2017-11-20 | End: 2018-11-07 | Stop reason: SDUPTHER

## 2017-11-20 RX ORDER — FINASTERIDE 5 MG/1
TABLET, FILM COATED ORAL
Qty: 90 TAB | Refills: 3 | Status: SHIPPED | OUTPATIENT
Start: 2017-11-20 | End: 2018-11-07 | Stop reason: SDUPTHER

## 2017-12-20 DIAGNOSIS — N40.1 BENIGN NODULAR PROSTATIC HYPERPLASIA WITH LOWER URINARY TRACT SYMPTOMS: ICD-10-CM

## 2017-12-20 RX ORDER — TAMSULOSIN HYDROCHLORIDE 0.4 MG/1
0.8 CAPSULE ORAL DAILY
Qty: 180 CAP | Refills: 0 | Status: SHIPPED | OUTPATIENT
Start: 2017-12-20 | End: 2018-03-27 | Stop reason: SDUPTHER

## 2017-12-21 NOTE — TELEPHONE ENCOUNTER
Was the patient seen in the last year in this department? Yes     Does patient have an active prescription for medications requested? No     Received Request Via: Pharmacy      Pt met protocol?: Yes    OV 11/17

## 2018-01-30 ENCOUNTER — HOSPITAL ENCOUNTER (OUTPATIENT)
Dept: LAB | Facility: MEDICAL CENTER | Age: 58
End: 2018-01-30
Attending: FAMILY MEDICINE
Payer: COMMERCIAL

## 2018-01-30 ENCOUNTER — OFFICE VISIT (OUTPATIENT)
Dept: MEDICAL GROUP | Facility: PHYSICIAN GROUP | Age: 58
End: 2018-01-30
Payer: COMMERCIAL

## 2018-01-30 VITALS
TEMPERATURE: 98.5 F | DIASTOLIC BLOOD PRESSURE: 78 MMHG | RESPIRATION RATE: 16 BRPM | WEIGHT: 308 LBS | SYSTOLIC BLOOD PRESSURE: 130 MMHG | HEART RATE: 66 BPM | BODY MASS INDEX: 40.82 KG/M2 | OXYGEN SATURATION: 96 % | HEIGHT: 73 IN

## 2018-01-30 DIAGNOSIS — E66.9 DIABETES MELLITUS TYPE 2 IN OBESE (HCC): ICD-10-CM

## 2018-01-30 DIAGNOSIS — M54.5 CHRONIC LOW BACK PAIN, UNSPECIFIED BACK PAIN LATERALITY, WITH SCIATICA PRESENCE UNSPECIFIED: ICD-10-CM

## 2018-01-30 DIAGNOSIS — E11.69 DIABETES MELLITUS TYPE 2 IN OBESE (HCC): ICD-10-CM

## 2018-01-30 DIAGNOSIS — G89.29 CHRONIC LOW BACK PAIN, UNSPECIFIED BACK PAIN LATERALITY, WITH SCIATICA PRESENCE UNSPECIFIED: ICD-10-CM

## 2018-01-30 DIAGNOSIS — F51.01 PRIMARY INSOMNIA: ICD-10-CM

## 2018-01-30 LAB
ALBUMIN SERPL BCP-MCNC: 4.6 G/DL (ref 3.2–4.9)
BUN SERPL-MCNC: 14 MG/DL (ref 8–22)
CALCIUM SERPL-MCNC: 8.9 MG/DL (ref 8.5–10.5)
CHLORIDE SERPL-SCNC: 105 MMOL/L (ref 96–112)
CO2 SERPL-SCNC: 25 MMOL/L (ref 20–33)
CREAT SERPL-MCNC: 0.9 MG/DL (ref 0.5–1.4)
EST. AVERAGE GLUCOSE BLD GHB EST-MCNC: 160 MG/DL
GLUCOSE SERPL-MCNC: 152 MG/DL (ref 65–99)
HBA1C MFR BLD: 7.2 % (ref 0–5.6)
PHOSPHATE SERPL-MCNC: 3.6 MG/DL (ref 2.5–4.5)
POTASSIUM SERPL-SCNC: 4.3 MMOL/L (ref 3.6–5.5)
SODIUM SERPL-SCNC: 138 MMOL/L (ref 135–145)

## 2018-01-30 PROCEDURE — 83036 HEMOGLOBIN GLYCOSYLATED A1C: CPT

## 2018-01-30 PROCEDURE — 36415 COLL VENOUS BLD VENIPUNCTURE: CPT

## 2018-01-30 PROCEDURE — 80069 RENAL FUNCTION PANEL: CPT

## 2018-01-30 PROCEDURE — 99214 OFFICE O/P EST MOD 30 MIN: CPT | Performed by: FAMILY MEDICINE

## 2018-01-30 RX ORDER — NATEGLINIDE 120 MG/1
120 TABLET ORAL
COMMUNITY
End: 2018-03-28 | Stop reason: CLARIF

## 2018-01-30 RX ORDER — ZOLPIDEM TARTRATE 10 MG/1
10 TABLET ORAL NIGHTLY PRN
Qty: 30 TAB | Refills: 2 | Status: SHIPPED | OUTPATIENT
Start: 2018-01-30 | End: 2018-05-01 | Stop reason: SDUPTHER

## 2018-01-30 NOTE — ASSESSMENT & PLAN NOTE
His A1c has gradually increased from 7 to now 8  He admits he did overindulge on halloween candy.   His LDL is 47   He takes losartan, metformin 1000bid, glipizide 5 bid was increased to 10 bid, asa 81 mg   he had rare episodes of feeling shaky last month.   He has repeat A1c order.   He is going to get scheduled for eye exam through work.   He notes no ulcers on feet but reports neuropathy and RLS that bothers him.   Advised on magnesium supplement, regular exercise.

## 2018-01-30 NOTE — ASSESSMENT & PLAN NOTE
Patient has chronic insomnia and RLS  He has been using melatonin, uses a cpap every night.   He used to get 7-8 hrs a night but now is only getting 6 at the most and feeling tired during the day.   He is taking trazodone 200mg at night, ambien did not help.   He was on ambien 5 mg but found it did not help. Will restart this at 10 mg  Advised caution regarding sedation and respiratory depression.

## 2018-01-30 NOTE — ASSESSMENT & PLAN NOTE
Patient is followed by his pain specialist, currently off opioid pain medications, he takes celebrex but is off it in preparation for an SI joint injection.

## 2018-02-01 ENCOUNTER — TELEPHONE (OUTPATIENT)
Dept: MEDICAL GROUP | Facility: PHYSICIAN GROUP | Age: 58
End: 2018-02-01

## 2018-02-01 NOTE — PROGRESS NOTES
Subjective:   Jamey Riojas is a 57 y.o. male here today for evaluation and management of:     Diabetes mellitus type 2 in obese  His A1c has gradually increased from 7 to now 8  He admits he did overindulge on halloween candy.   His LDL is 47   He takes losartan, metformin 1000bid, glipizide 5 bid was increased to 10 bid, asa 81 mg   he had rare episodes of feeling shaky last month.   He has repeat A1c order.   He is going to get scheduled for eye exam through work.   He notes no ulcers on feet but reports neuropathy and RLS that bothers him.   Advised on magnesium supplement, regular exercise.       Insomnia  Patient has chronic insomnia and RLS  He has been using melatonin, uses a cpap every night.   He used to get 7-8 hrs a night but now is only getting 6 at the most and feeling tired during the day.   He is taking trazodone 200mg at night, ambien did not help.   He was on ambien 5 mg but found it did not help. Will restart this at 10 mg  Advised caution regarding sedation and respiratory depression.       Chronic back pain  Patient is followed by his pain specialist, currently off opioid pain medications, he takes celebrex but is off it in preparation for an SI joint injection.            Current medicines (including changes today)  Current Outpatient Prescriptions   Medication Sig Dispense Refill   • nateglinide (STARLIX) 120 MG Tab Take 120 mg by mouth 2 times daily, before breakfast and dinner.     • zolpidem (AMBIEN) 10 MG Tab Take 1 Tab by mouth at bedtime as needed for Sleep for up to 30 days. 30 Tab 2   • tamsulosin (FLOMAX) 0.4 MG capsule Take 2 Caps by mouth every day. 180 Cap 0   • finasteride (PROSCAR) 5 MG Tab TAKE ONE TABLET BY MOUTH ONCE DAILY 90 Tab 3   • trazodone (DESYREL) 100 MG Tab TAKE 1-2 TABLETS (100-200MG) BY MOUTH AT NIGHT FOR SLEEP IF NEEDED 180 Tab 3   • glipiZIDE (GLUCOTROL) 10 MG Tab Take 1 Tab by mouth 2 times a day. 180 Tab 3   • celecoxib (CELEBREX) 200 MG Cap TAKE ONE  CAPSULE BY MOUTH TWICE DAILY 180 Cap 0   • gabapentin (NEURONTIN) 300 MG Cap TAKE TWO CAPSULES BY MOUTH THREE TIMES DAILY 540 Cap 1   • losartan (COZAAR) 100 MG Tab TAKE ONE TABLET BY MOUTH ONCE DAILY 90 Tab 1   • amlodipine (NORVASC) 10 MG Tab TAKE ONE TABLET BY MOUTH ONCE DAILY 90 Tab 1   • metformin (GLUCOPHAGE) 1000 MG tablet TAKE ONE TABLET BY MOUTH TWICE DAILY WITH MEALS 180 Tab 3   • Naloxone HCl 4 MG/0.1ML Liquid Spray 1 Spray in nose as needed (For severe sleepiness or difficulty breathing from possible overdose. Call 911 after administration.). 1 Each 0   • sertraline (ZOLOFT) 50 MG Tab Take 1 Tab by mouth every day. 90 Tab 3   • Misc. Devices Misc One touch ultra test strips. Patient has DM type 2 and tests twice per day 100 Each 3   • Misc. Devices Misc One touch ultra lancets; patient has diabetes mellitus type 2 and test once per day 100 Device 3   • tizanidine (ZANAFLEX) 4 MG Tab Take 1 Tab by mouth every 6 hours as needed (back pain). (Patient taking differently: Take 4 mg by mouth every day.) 270 Tab 3   • nateglinide (STARLIX) 120 MG Tab TAKE ONE TABLET BY MOUTH THREE TIMES DAILY BEFORE MEAL(S) (Patient taking differently: TAKE ONE TABLET BY MOUTH DAILY BEFORE MEAL(S)) 270 Tab 3   • lansoprazole (PREVACID) 30 MG CAPSULE DELAYED RELEASE Take 1 Cap by mouth every day. 30 Cap 3   • omeprazole (PRILOSEC) 20 MG delayed-release capsule TAKE ONE CAPSULE BY MOUTH ONCE DAILY 90 Cap 3   • nystatin (MYCOSTATIN) 160478 UNIT/GM Ointment Apply to decubitus ulcer 8 times per day in a thick layer 6 Tube 3   • Misc. Devices Misc CPAP supplies; fax to [place] [phone]. 100 Device 3   • fluticasone (FLONASE) 50 MCG/ACT nasal spray Spray 2 Sprays in nose every day. Each Nostril 1 Bottle 3   • Multiple Vitamins-Minerals (MULTIVITAMIN PO) Take 1 Tab by mouth every day.       • Fexofenadine HCl (ALLEGRA PO) Take  by mouth as needed.     • Cholecalciferol (VITAMIN D) 2000 UNIT CAPS Take  by mouth every day.     •  "GLUCOSAMINE-CHONDROITIN-MSM PO Take 2 Tabs by mouth 2 Times a Day.     • aspirin 81 MG tablet Take 81 mg by mouth every day.     • albuterol (VENTOLIN OR PROVENTIL) 108 (90 BASE) MCG/ACT AERS inhalation aerosol Inhale 2 Puffs by mouth every 6 hours as needed for Shortness of Breath. 8.5 g 3     No current facility-administered medications for this visit.      He  has a past medical history of Anesthesia; Anxiety; Arthritis; Body aches (11/19/2014); Bronchitis (2009); Chickenpox; Diabetes; Dysuria (7/30/2014); Enlarged liver; GERD (gastroesophageal reflux disease) (12/21/2011); Heart burn; Hypertension; Indigestion; Influenza; Insomnia (12/21/2011); Nephrolithiasis (7/30/2014); Obesity; BRAIN (obstructive sleep apnea); Pain (12-14-12); Psychiatric problem; Sleep apnea; and Snoring.    ROS  No chest pain, no shortness of breath, no abdominal pain       Objective:     Blood pressure 130/78, pulse 66, temperature 36.9 °C (98.5 °F), resp. rate 16, height 1.854 m (6' 1\"), weight (!) 139.7 kg (308 lb), SpO2 96 %. Body mass index is 40.64 kg/m².   Physical Exam:  Constitutional: Alert, no distress.  Skin: Warm, dry, good turgor, no rashes in visible areas.  Eye: Equal, round and reactive, conjunctiva clear, lids normal.  ENMT: Lips without lesions, good dentition, oropharynx clear.  Neck: Trachea midline, no masses, no thyromegaly. No cervical or supraclavicular lymphadenopathy  Respiratory: Unlabored respiratory effort, lungs clear to auscultation, no wheezes, no ronchi.  Cardiovascular: Normal S1, S2, no murmur, no edema.  Abdomen: Soft, non-tender, no masses, no hepatosplenomegaly.  Psych: Alert and oriented x3, normal affect and mood.        Assessment and Plan:   The following treatment plan was discussed    1. Diabetes mellitus type 2 in obese (CMS-HCC)worsening  Worsening, encouraged to make diet, lifestyle changes, medications for DM.     2. Primary insomnia  Uncontrolled. Will try increase of ambien to 10 mg.   - " zolpidem (AMBIEN) 10 MG Tab; Take 1 Tab by mouth at bedtime as needed for Sleep for up to 30 days.  Dispense: 30 Tab; Refill: 2    3. Chronic low back pain, unspecified back pain laterality, with sciatica presence unspecified  Follow up with pain management.       Followup: Return in about 3 months (around 4/30/2018) for DM, HTN, chronic pain, .

## 2018-02-13 ENCOUNTER — OFFICE VISIT (OUTPATIENT)
Dept: MEDICAL GROUP | Facility: PHYSICIAN GROUP | Age: 58
End: 2018-02-13
Payer: COMMERCIAL

## 2018-02-13 VITALS
DIASTOLIC BLOOD PRESSURE: 80 MMHG | RESPIRATION RATE: 16 BRPM | TEMPERATURE: 97.5 F | BODY MASS INDEX: 41.22 KG/M2 | WEIGHT: 311 LBS | HEIGHT: 73 IN | HEART RATE: 74 BPM | OXYGEN SATURATION: 98 % | SYSTOLIC BLOOD PRESSURE: 142 MMHG

## 2018-02-13 DIAGNOSIS — R52 BODY ACHES: ICD-10-CM

## 2018-02-13 DIAGNOSIS — R50.9 FEVER AND CHILLS: ICD-10-CM

## 2018-02-13 DIAGNOSIS — R19.7 DIARRHEA OF PRESUMED INFECTIOUS ORIGIN: ICD-10-CM

## 2018-02-13 LAB
FLUAV+FLUBV AG SPEC QL IA: NORMAL
INT CON NEG: NEGATIVE
INT CON POS: POSITIVE

## 2018-02-13 PROCEDURE — 87804 INFLUENZA ASSAY W/OPTIC: CPT | Performed by: NURSE PRACTITIONER

## 2018-02-13 PROCEDURE — 99214 OFFICE O/P EST MOD 30 MIN: CPT | Performed by: NURSE PRACTITIONER

## 2018-02-13 NOTE — PROGRESS NOTES
Chief Complaint   Patient presents with   • Cough     fever, diarrhea x 5 days         This is a 57 y.o.male patient that presents today with the following: Fever and chills, diarrhea    Fever and chills  This started late Thursday, early Friday of last week, which would put symptom onset of 4-5 days. He is afebrile in office today, 97.5, he states he has had fever at home, but he has not measured his temperature. He has all over body aches, headaches, runny nose, cough, and diarrhea. The diarrhea has improved, but the body aches, headache, runny nose, fever and chills. He has been exposed to sick contacts, he works at [employer], and his wife has been ill with the same symptoms. In office influenza A/B-. He does deny sore throat and thus we did not swab for strep. I did discuss with him that symptoms are still consistent with viral upper respiratory illness, we discussed the importance of supportive care including rest, fluids, over-the-counter analgesics such as acetaminophen or ibuprofen for pain and/or fever. He is to follow-up if symptoms worsen, he is to go to the emergency room if symptoms become severe.       Hospital Outpatient Visit on 01/30/2018   Component Date Value   • Glycohemoglobin 01/30/2018 7.2*   • Est Avg Glucose 01/30/2018 160    • Sodium 01/30/2018 138    • Potassium 01/30/2018 4.3    • Chloride 01/30/2018 105    • Co2 01/30/2018 25    • Glucose 01/30/2018 152*   • Creatinine 01/30/2018 0.90    • Bun 01/30/2018 14    • Calcium 01/30/2018 8.9    • Phosphorus 01/30/2018 3.6    • Albumin 01/30/2018 4.6    • GFR If  01/30/2018 >60    • GFR If Non  [facility]* 01/30/2018 >60              Past Medical History:   Diagnosis Date   • Anesthesia     mother has hard time waking up   • Anxiety    • Arthritis     knees and shoulder joints and hands   • Body aches 11/19/2014   • Bronchitis 2009   • Chickenpox    • Diabetes     oral medication and diet   • Dysuria 7/30/2014   • Enlarged liver     • GERD (gastroesophageal reflux disease) 12/21/2011   • Heart burn    • Hypertension    • Indigestion    • Influenza    • Insomnia 12/21/2011   • Nephrolithiasis 7/30/2014    This is a recurring problem. Patient states that he was evaluated for a kidney stone 2012. CT scan showed a stone that was not obstructing at that time. He has been having increasing right-sided back pain for approximately the last month. He has not had any fever or chills.   • Obesity    • BRAIN (obstructive sleep apnea)    • Pain 12-14-12    lower back,neck, 5/10   • Psychiatric problem     depression   • Sleep apnea     CPAP, O2 @ HS 2.5 liters   • Snoring        Past Surgical History:   Procedure Laterality Date   • LUMBAR FUSION POSTERIOR  12/30/2012    Performed by Benjamin Kumar M.D. at SUR[facility]ERY [facility]   • CERVICAL DISK AND FUSION ANTERIOR  11/30/2012    Performed by Benjamin Kumar M.D. at [facility]ERY [facility]   • HARDWARE REMOVAL ORTHO  11/30/2012    Performed by Benjamin Kumar M.D. at [facility]ERY [facility]   • KNEE ARTHROSCOPY  9/18/2012    Performed by Servando Francis M.D. at [facility]   • MEDIAL MENISCECTOMY  9/18/2012    Performed by Servando Francis M.D. at SURGERY [facility]   • DEBRIDEMENT  9/18/2012    Performed by Servando Francis M.D. at [facility]   • LUMBAR FUSION POSTERIOR  6/18/2010    Performed by BENJAMIN KUMAR at SURGERY [facility]   • LUMBAR DECOMPRESSION  6/18/2010    Performed by BENJAMIN KUMAR at [facility]GERY [facility]   • KNEE ARTHROSCOPY  10/20/2009    Performed by SERVANDO FRANCIS at [facility]   • MEDIAL MENISCECTOMY  10/20/2009    Performed by SERVANDO FRANCIS at [facility]   • CERVICAL FUSION POSTERIOR  9/2007    c -3-4-5    • CHOLECYSTECTOMY  1984    open   • FOOT SURGERY      left  Farrar's neuroma and heel spur       Family History   Problem Relation Age of Onset   • Other Mother      osteopenia   • Hypertension  Mother    • Psychiatry Mother    • Other Father      brain tumor   • Heart Attack Maternal Grandfather    • Sleep Apnea Neg Hx        Penicillins; Phenergan [promethazine hcl]; and Influenza virus vacc    Current Outpatient Prescriptions Ordered in [facility]   Medication Sig Dispense Refill   • nateglinide (STARLIX) 120 MG Tab Take 120 mg by mouth 2 times daily, before breakfast and dinner.     • zolpidem (AMBIEN) 10 MG Tab Take 1 Tab by mouth at bedtime as needed for Sleep for up to 30 days. 30 Tab 2   • tamsulosin (FLOMAX) 0.4 MG capsule Take 2 Caps by mouth every day. 180 Cap 0   • finasteride (PROSCAR) 5 MG Tab TAKE ONE TABLET BY MOUTH ONCE DAILY 90 Tab 3   • trazodone (DESYREL) 100 MG Tab TAKE 1-2 TABLETS (100-200MG) BY MOUTH AT NIGHT FOR SLEEP IF NEEDED 180 Tab 3   • glipiZIDE (GLUCOTROL) 10 MG Tab Take 1 Tab by mouth 2 times a day. 180 Tab 3   • celecoxib (CELEBREX) 200 MG Cap TAKE ONE CAPSULE BY MOUTH TWICE DAILY 180 Cap 0   • gabapentin (NEURONTIN) 300 MG Cap TAKE TWO CAPSULES BY MOUTH THREE TIMES DAILY 540 Cap 1   • losartan (COZAAR) 100 MG Tab TAKE ONE TABLET BY MOUTH ONCE DAILY 90 Tab 1   • amlodipine (NORVASC) 10 MG Tab TAKE ONE TABLET BY MOUTH ONCE DAILY 90 Tab 1   • metformin (GLUCOPHAGE) 1000 MG tablet TAKE ONE TABLET BY MOUTH TWICE DAILY WITH MEALS 180 Tab 3   • Naloxone HCl 4 MG/0.1ML Liquid Spray 1 Spray in nose as needed (For severe sleepiness or difficulty breathing from possible overdose. Call 911 after administration.). 1 Each 0   • sertraline (ZOLOFT) 50 MG Tab Take 1 Tab by mouth every day. 90 Tab 3   • Misc. Devices Misc One touch ultra test strips. Patient has DM type 2 and tests twice per day 100 Each 3   • Misc. Devices Misc One touch ultra lancets; patient has diabetes mellitus type 2 and test once per day 100 Device 3   • tizanidine (ZANAFLEX) 4 MG Tab Take 1 Tab by mouth every 6 hours as needed (back pain). (Patient taking differently: Take 4 mg by mouth every day.) 270 Tab 3   •  "nateglinide (STARLIX) 120 MG Tab TAKE ONE TABLET BY MOUTH THREE TIMES DAILY BEFORE MEAL(S) (Patient taking differently: TAKE ONE TABLET BY MOUTH DAILY BEFORE MEAL(S)) 270 Tab 3   • lansoprazole (PREVACID) 30 MG CAPSULE DELAYED RELEASE Take 1 Cap by mouth every day. 30 Cap 3   • omeprazole (PRILOSEC) 20 MG delayed-release capsule TAKE ONE CAPSULE BY MOUTH ONCE DAILY 90 Cap 3   • nystatin (MYCOSTATIN) 120837 UNIT/GM Ointment Apply to decubitus ulcer 8 times per day in a thick layer 6 Tube 3   • Misc. Devices Misc CPAP supplies; fax to [place] [phone]. 100 Device 3   • fluticasone (FLONASE) 50 MCG/ACT nasal spray Spray 2 Sprays in nose every day. Each Nostril 1 Bottle 3   • albuterol (VENTOLIN OR PROVENTIL) 108 (90 BASE) MCG/ACT AERS inhalation aerosol Inhale 2 Puffs by mouth every 6 hours as needed for Shortness of Breath. 8.5 g 3   • Multiple Vitamins-Minerals (MULTIVITAMIN PO) Take 1 Tab by mouth every day.       • Fexofenadine HCl (ALLEGRA PO) Take  by mouth as needed.     • Cholecalciferol (VITAMIN D) 2000 UNIT CAPS Take  by mouth every day.     • GLUCOSAMINE-CHONDROITIN-MSM PO Take 2 Tabs by mouth 2 Times a Day.     • aspirin 81 MG tablet Take 81 mg by mouth every day.       No current [facility]-ordered facility-administered medications on file.        Constitutional ROS: No unexpected change in weight, No weakness, Positive for chills and fever, per history of present illness  Pulmonary ROS: Positive for cough, runny nose  Cardiovascular ROS: No chest pain, No edema, No palpitations  Gastrointestinal ROS: No abdominal pain, No significant change in appetite, No abdominal bloating or early satiety, Positive for diarrhea, per history of present illness  Musculoskeletal/Extremities ROS: Positive for all over body aches, per history of present illness  Neurologic ROS: Normal development, No seizures, No weakness    Physical exam:  /80   Pulse 74   Temp 36.4 °C (97.5 °F)   Resp 16   Ht 1.854 m (6' 1\")   Wt " (!) 141.1 kg (311 lb)   SpO2 98%   BMI 41.03 kg/m²   General Appearance: Middle-aged male, alert, no distress, obese, well-groomed  Skin: Skin color, texture, turgor normal. No rashes or lesions.  Eyes: conjunctivae/corneas clear. PERRL, EOM's intact. Fundi benign  Ears: External ears normal. Canals clear. TM's normal.  Oropharynx: positive findings: mild oropharyngeal erythema  Lungs: negative findings: normal respiratory rate and rhythm, lungs clear to auscultation  Heart: negative. RRR without murmur, gallop, or rubs.  No ectopy.  Abdomen: Abdomen soft, non-tender. BS normal. No masses,  No organomegaly  Musculoskeletal: negative findings: no evidence of joint instability, strength normal, no deformities present  Neurologic: intact, oriented, mood appropriate, judgment intact. Cranial nerves II-12 grossly intact    Medical decision making/discussion: Discussed the importance of supportive care, rest, hydration, he is to continue on bland BRAT type diet and advance as tolerated. He can continue with over-the-counter Imodium for diarrhea. He can use over-the-counter analgesia such as acetaminophen or ibuprofen for pain and/or fever. He was given note for work. He is to return if symptoms worsen and he is to go to the emergency room should his symptoms become severe.    Jamey was seen today for cough.    Diagnoses and all orders for this visit:    Fever and chills  -     POCT Influenza A/B    Body aches    Diarrhea of presumed infectious origin          Please note that this dictation was created using voice recognition software. I have made every reasonable attempt to correct obvious errors, but I expect that there are errors of grammar and possibly content that I did not discover before finalizing the note.

## 2018-02-13 NOTE — ASSESSMENT & PLAN NOTE
This started late Thursday, early Friday of last week, which would put symptom onset of 4-5 days. He is afebrile in office today, 97.5, he states he has had fever at home, but he has not measured his temperature. He has all over body aches, headaches, runny nose, cough, and diarrhea. The diarrhea has improved, but the body aches, headache, runny nose, fever and chills. He has been exposed to sick contacts, he works at [employer], and his wife has been ill with the same symptoms. In office influenza A/B-. He does deny sore throat and thus we did not swab for strep. I did discuss with him that symptoms are still consistent with viral upper respiratory illness, we discussed the importance of supportive care including rest, fluids, over-the-counter analgesics such as acetaminophen or ibuprofen for pain and/or fever. He is to follow-up if symptoms worsen, he is to go to the emergency room if symptoms become severe.

## 2018-02-13 NOTE — LETTER
February 13, 2018         Patient: Jamey Riojas   YOB: 1960   Date of Visit: 2/13/2018           To Whom it May Concern:    Jamey Riojas was seen in my clinic on 2/13/2018. He may return to work on 02/17/18.    If you have any questions or concerns, please don't hesitate to call.        Sincerely,           MICHAEL Rodríguez.  Electronically Signed

## 2018-02-16 DIAGNOSIS — M48.062 LUMBAR STENOSIS WITH NEUROGENIC CLAUDICATION: ICD-10-CM

## 2018-02-16 DIAGNOSIS — F33.1 MODERATE EPISODE OF RECURRENT MAJOR DEPRESSIVE DISORDER (HCC): ICD-10-CM

## 2018-02-16 RX ORDER — LOSARTAN POTASSIUM 100 MG/1
TABLET ORAL
Qty: 90 TAB | Refills: 1 | Status: SHIPPED | OUTPATIENT
Start: 2018-02-16 | End: 2018-08-13 | Stop reason: SDUPTHER

## 2018-02-16 RX ORDER — CELECOXIB 200 MG/1
CAPSULE ORAL
Qty: 180 CAP | Refills: 1 | Status: SHIPPED | OUTPATIENT
Start: 2018-02-16 | End: 2018-08-06 | Stop reason: SDUPTHER

## 2018-02-16 RX ORDER — AMLODIPINE BESYLATE 10 MG/1
TABLET ORAL
Qty: 90 TAB | Refills: 1 | Status: SHIPPED | OUTPATIENT
Start: 2018-02-16 | End: 2018-08-06 | Stop reason: SDUPTHER

## 2018-02-16 NOTE — TELEPHONE ENCOUNTER
Was the patient seen in the last year in this department? Yes     Does patient have an active prescription for medications requested? No     Received Request Via: Pharmacy      Pt met protocol?: Yes, OV last week   BP Readings from Last 1 Encounters:   02/13/18 142/80

## 2018-02-16 NOTE — TELEPHONE ENCOUNTER
Refill X 6 months, sent to pharmacy.Pt. Seen in the last 6 months per protocol.   Lab Results   Component Value Date/Time    SODIUM 138 01/30/2018 10:19 AM    POTASSIUM 4.3 01/30/2018 10:19 AM    CHLORIDE 105 01/30/2018 10:19 AM    CO2 25 01/30/2018 10:19 AM    GLUCOSE 152 (H) 01/30/2018 10:19 AM    BUN 14 01/30/2018 10:19 AM    CREATININE 0.90 01/30/2018 10:19 AM    CREATININE 0.8 09/10/2007 10:15 AM

## 2018-02-16 NOTE — TELEPHONE ENCOUNTER
Was the patient seen in the last year in this department? Yes     Does patient have an active prescription for medications requested? No     Received Request Via: Pharmacy      Pt met protocol?: Yes, OV earlier this week

## 2018-03-09 ENCOUNTER — OFFICE VISIT (OUTPATIENT)
Dept: MEDICAL GROUP | Facility: PHYSICIAN GROUP | Age: 58
End: 2018-03-09
Payer: COMMERCIAL

## 2018-03-09 VITALS
TEMPERATURE: 97.3 F | HEIGHT: 73 IN | OXYGEN SATURATION: 96 % | DIASTOLIC BLOOD PRESSURE: 86 MMHG | SYSTOLIC BLOOD PRESSURE: 150 MMHG | BODY MASS INDEX: 41.08 KG/M2 | WEIGHT: 310 LBS | RESPIRATION RATE: 16 BRPM | HEART RATE: 78 BPM

## 2018-03-09 DIAGNOSIS — R50.9 FEVER AND CHILLS: ICD-10-CM

## 2018-03-09 PROCEDURE — 99212 OFFICE O/P EST SF 10 MIN: CPT | Performed by: NURSE PRACTITIONER

## 2018-03-10 NOTE — PROGRESS NOTES
CC: Follow-up on fever and chills and FMLA paperwork    HISTORY OF THE PRESENT ILLNESS: Patient is a 57 y.o. male. This pleasant patient is here today for follow-up on fever and chills and for FMLA paperwork revision.      Fever and chills  Patient reports he is feeling much better and he has been back to work for over 2 weeks. He denies diarrhea, fever, chills. He is needing FMLA paperwork revised. Apparently his employer and [employer] told him that some information was missing. After patient left today, I called [employer] and spoke with Jason. I answered their questions based on patient's 2/13/18 appointment with LENORE Pringle.  Jason said that everything has been taking care of over the phone. I did document on the paperwork and had it scanned into [employer]. Patient was notified he could  the paperwork for his personal records.      Allergies: Penicillins; Phenergan [promethazine hcl]; and Influenza virus vacc    Current Outpatient Prescriptions Ordered in [facility]   Medication Sig Dispense Refill   • amLODIPine (NORVASC) 10 MG Tab TAKE ONE TABLET BY MOUTH ONCE DAILY 90 Tab 1   • sertraline (ZOLOFT) 50 MG Tab TAKE ONE TABLET BY MOUTH ONCE DAILY 90 Tab 0   • celecoxib (CELEBREX) 200 MG Cap TAKE ONE CAPSULE BY MOUTH TWICE DAILY 180 Cap 1   • losartan (COZAAR) 100 MG Tab TAKE ONE TABLET BY MOUTH ONCE DAILY 90 Tab 1   • nateglinide (STARLIX) 120 MG Tab Take 120 mg by mouth 2 times daily, before breakfast and dinner.     • tamsulosin (FLOMAX) 0.4 MG capsule Take 2 Caps by mouth every day. 180 Cap 0   • finasteride (PROSCAR) 5 MG Tab TAKE ONE TABLET BY MOUTH ONCE DAILY 90 Tab 3   • trazodone (DESYREL) 100 MG Tab TAKE 1-2 TABLETS (100-200MG) BY MOUTH AT NIGHT FOR SLEEP IF NEEDED 180 Tab 3   • glipiZIDE (GLUCOTROL) 10 MG Tab Take 1 Tab by mouth 2 times a day. 180 Tab 3   • gabapentin (NEURONTIN) 300 MG Cap TAKE TWO CAPSULES BY MOUTH THREE TIMES DAILY 540 Cap 1   • metformin (GLUCOPHAGE) 1000 MG tablet TAKE ONE TABLET BY  MOUTH TWICE DAILY WITH MEALS 180 Tab 3   • Naloxone HCl 4 MG/0.1ML Liquid Spray 1 Spray in nose as needed (For severe sleepiness or difficulty breathing from possible overdose. Call 911 after administration.). 1 Each 0   • Misc. Devices Misc One touch ultra test strips. Patient has DM type 2 and tests twice per day 100 Each 3   • Misc. Devices Misc One touch ultra lancets; patient has diabetes mellitus type 2 and test once per day 100 Device 3   • tizanidine (ZANAFLEX) 4 MG Tab Take 1 Tab by mouth every 6 hours as needed (back pain). (Patient taking differently: Take 4 mg by mouth every day.) 270 Tab 3   • nateglinide (STARLIX) 120 MG Tab TAKE ONE TABLET BY MOUTH THREE TIMES DAILY BEFORE MEAL(S) (Patient taking differently: TAKE ONE TABLET BY MOUTH DAILY BEFORE MEAL(S)) 270 Tab 3   • lansoprazole (PREVACID) 30 MG CAPSULE DELAYED RELEASE Take 1 Cap by mouth every day. 30 Cap 3   • omeprazole (PRILOSEC) 20 MG delayed-release capsule TAKE ONE CAPSULE BY MOUTH ONCE DAILY 90 Cap 3   • nystatin (MYCOSTATIN) 493726 UNIT/GM Ointment Apply to decubitus ulcer 8 times per day in a thick layer 6 Tube 3   • Misc. Devices Misc CPAP supplies; fax to [facility] [phone]. 100 Device 3   • fluticasone (FLONASE) 50 MCG/ACT nasal spray Spray 2 Sprays in nose every day. Each Nostril 1 Bottle 3   • albuterol (VENTOLIN OR PROVENTIL) 108 (90 BASE) MCG/ACT AERS inhalation aerosol Inhale 2 Puffs by mouth every 6 hours as needed for Shortness of Breath. 8.5 g 3   • Multiple Vitamins-Minerals (MULTIVITAMIN PO) Take 1 Tab by mouth every day.       • Fexofenadine HCl (ALLEGRA PO) Take  by mouth as needed.     • Cholecalciferol (VITAMIN D) 2000 UNIT CAPS Take  by mouth every day.     • GLUCOSAMINE-CHONDROITIN-MSM PO Take 2 Tabs by mouth 2 Times a Day.     • aspirin 81 MG tablet Take 81 mg by mouth every day.       No current [facility]-ordered facility-administered medications on file.        Past Medical History:   Diagnosis Date   • Anesthesia      mother has hard time waking up   • Anxiety    • Arthritis     knees and shoulder joints and hands   • Body aches 11/19/2014   • Bronchitis 2009   • Chickenpox    • Diabetes     oral medication and diet   • Dysuria 7/30/2014   • Enlarged liver    • GERD (gastroesophageal reflux disease) 12/21/2011   • Heart burn    • Hypertension    • Indigestion    • Influenza    • Insomnia 12/21/2011   • Nephrolithiasis 7/30/2014    This is a recurring problem. Patient states that he was evaluated for a kidney stone 2012. CT scan showed a stone that was not obstructing at that time. He has been having increasing right-sided back pain for approximately the last month. He has not had any fever or chills.   • Obesity    • BRAIN (obstructive sleep apnea)    • Pain 12-14-12    lower back,neck, 5/10   • Psychiatric problem     depression   • Sleep apnea     CPAP, O2 @ HS 2.5 liters   • Snoring        Past Surgical History:   Procedure Laterality Date   • LUMBAR FUSION POSTERIOR  12/30/2012    Performed by Benjamin Kumar M.D. at SURGERY [facility]   • CERVICAL DISK AND FUSION ANTERIOR  11/30/2012    Performed by Benjamin Kumar M.D. at [facility]   • HARDWARE REMOVAL ORTHO  11/30/2012    Performed by Benjamin Kumar M.D. at [facility]   • KNEE ARTHROSCOPY  9/18/2012    Performed by Servando Long M.D. at [facility]   • MEDIAL MENISCECTOMY  9/18/2012    Performed by Servando Long M.D. at [facility]   • DEBRIDEMENT  9/18/2012    Performed by Servando Long M.D. at [facility]   • LUMBAR FUSION POSTERIOR  6/18/2010    Performed by BENJAMIN KUMAR at [facility]   • LUMBAR DECOMPRESSION  6/18/2010    Performed by BENJAMIN KUMAR at [facility]   • KNEE ARTHROSCOPY  10/20/2009    Performed by SERVANDO LONG at [facility]   • MEDIAL MENISCECTOMY  10/20/2009    Performed by SERVANDO LONG at [facility]   •  "CERVICAL FUSION POSTERIOR  9/2007    c -3-4-5    • CHOLECYSTECTOMY  1984    open   • FOOT SURGERY      left  Farrar's neuroma and heel spur       Social History   Substance Use Topics   • Smoking status: Never Smoker   • Smokeless tobacco: Never Used   • Alcohol use No       Family History   Problem Relation Age of Onset   • Other Mother      osteopenia   • Hypertension Mother    • Psychiatry Mother    • Other Father      brain tumor   • Heart Attack Maternal Grandfather    • Sleep Apnea Neg Hx        ROS:   As in HPI  Negative for chest pain, abdominal pain          Exam: Blood pressure 150/86, pulse 78, temperature 36.3 °C (97.3 °F), resp. rate 16, height 1.854 m (6' 1\"), weight (!) 140.6 kg (310 lb), SpO2 96 %. Body mass index is 40.9 kg/m².    General: Alert, pleasant, well nourished, well developed male in NAD  Pulmonary: Clear to ausculation.  Normal effort. No rales, ronchi, or wheezing.  Cardiovascular: Normal rate and rhythm without murmur. Carotid and radial pulses are intact and equal bilaterally.  No lower extremity edema.  Abdomen: Soft, nontender, nondistended. Normal bowel sounds.   Psych: Normal mood and affect. Alert and oriented. Judgment and insight is normal.    Please note that this dictation was created using voice recognition software. I have made every reasonable attempt to correct obvious errors, but I expect that there are errors of grammar and possibly content that I did not discover before finalizing the note.      Assessment/Plan  1. Fever and chills  FMLA paperwork revised. Telephone call made to Jason at [employer]. They needed to know if patient was seen more than once for his acute illness and any prescriptions were done.      "

## 2018-03-10 NOTE — ASSESSMENT & PLAN NOTE
Patient reports he is feeling much better and he has been back to work for over 2 weeks. He denies diarrhea, fever, chills. He is needing [facility] paperwork revised. Apparently his employer and [employer] told him that some information was missing. After patient left today, I called [employer] and spoke with Jason. I answered their questions based on patient's 2/13/18 appointment with LENORE Pringle.  Jason said that everything has been taking care of over the phone. I did document on the paperwork and had it scanned into [facility]. Patient was notified he could  the paperwork for his personal records.

## 2018-03-27 DIAGNOSIS — N40.1 BENIGN NODULAR PROSTATIC HYPERPLASIA WITH LOWER URINARY TRACT SYMPTOMS: ICD-10-CM

## 2018-03-27 DIAGNOSIS — F33.1 MODERATE EPISODE OF RECURRENT MAJOR DEPRESSIVE DISORDER (HCC): ICD-10-CM

## 2018-03-28 RX ORDER — NATEGLINIDE 120 MG/1
TABLET ORAL
Qty: 270 TAB | Refills: 0 | Status: SHIPPED | OUTPATIENT
Start: 2018-03-28 | End: 2018-08-06 | Stop reason: SDUPTHER

## 2018-03-28 RX ORDER — TAMSULOSIN HYDROCHLORIDE 0.4 MG/1
CAPSULE ORAL
Qty: 180 CAP | Refills: 0 | Status: SHIPPED | OUTPATIENT
Start: 2018-03-28 | End: 2018-07-03 | Stop reason: SDUPTHER

## 2018-03-28 NOTE — TELEPHONE ENCOUNTER
Was the patient seen in the last year in this department? Yes     Does patient have an active prescription for medications requested? No     Received Request Via: Pharmacy      Pt met protocol?: Yes    LAST OV 03/09/2018      Lab Results  Component Value Date/Time   HBA1C 7.2 (H) 01/30/2018 1019       Lab Results  Component Value Date/Time   AVGLUC 160 01/30/2018 1019       Lab Results  Component Value Date/Time   CHOLSTRLTOT 134 11/03/2017 1010       Lab Results  Component Value Date/Time   TRIGLYCERIDE 261 (H) 11/03/2017 1010       Lab Results  Component Value Date/Time   HDL 35 (A) 11/03/2017 1010       Lab Results  Component Value Date/Time   LDL 47 11/03/2017 1010

## 2018-03-28 NOTE — TELEPHONE ENCOUNTER
*PT NEEDS TO UPDATE LABS*  Was the patient seen in the last year in this department? Yes     Does patient have an active prescription for medications requested? No     Received Request Via: Pharmacy      Pt met protocol?: Yes    LAST OV 03/09/2018      Lab Results  Component Value Date/Time   SIGIND POS (POS) 07/29/2014 1130       Lab Results  Component Value Date/Time   SOURCE UR 07/29/2014 1130       Lab Results  Component Value Date/Time   URINECULT Klebsiella pneumoniae  10-50,000 cfu/mL (A) 07/29/2014 1130

## 2018-04-05 RX ORDER — GABAPENTIN 300 MG/1
CAPSULE ORAL
Qty: 540 CAP | Refills: 0 | Status: SHIPPED | OUTPATIENT
Start: 2018-04-05 | End: 2018-07-03 | Stop reason: SDUPTHER

## 2018-04-12 ENCOUNTER — OFFICE VISIT (OUTPATIENT)
Dept: MEDICAL GROUP | Facility: PHYSICIAN GROUP | Age: 58
End: 2018-04-12
Payer: COMMERCIAL

## 2018-04-12 VITALS
OXYGEN SATURATION: 95 % | HEART RATE: 78 BPM | HEIGHT: 73 IN | RESPIRATION RATE: 16 BRPM | SYSTOLIC BLOOD PRESSURE: 152 MMHG | DIASTOLIC BLOOD PRESSURE: 80 MMHG | TEMPERATURE: 97.6 F | WEIGHT: 315 LBS | BODY MASS INDEX: 41.75 KG/M2

## 2018-04-12 DIAGNOSIS — E11.69 DIABETES MELLITUS TYPE 2 IN OBESE (HCC): ICD-10-CM

## 2018-04-12 DIAGNOSIS — I10 ESSENTIAL HYPERTENSION: ICD-10-CM

## 2018-04-12 DIAGNOSIS — E66.9 DIABETES MELLITUS TYPE 2 IN OBESE (HCC): ICD-10-CM

## 2018-04-12 PROCEDURE — 99214 OFFICE O/P EST MOD 30 MIN: CPT | Performed by: FAMILY MEDICINE

## 2018-04-12 RX ORDER — METOPROLOL SUCCINATE 25 MG/1
25 TABLET, EXTENDED RELEASE ORAL DAILY
Qty: 90 TAB | Refills: 3 | Status: SHIPPED | OUTPATIENT
Start: 2018-04-12 | End: 2019-03-27 | Stop reason: SDUPTHER

## 2018-04-12 RX ORDER — LIDOCAINE 50 MG/G
1 PATCH TOPICAL EVERY 24 HOURS
Qty: 10 PATCH | Refills: 6 | Status: SHIPPED | OUTPATIENT
Start: 2018-04-12 | End: 2018-05-10

## 2018-04-12 RX ORDER — AMITRIPTYLINE HYDROCHLORIDE 50 MG/1
50 TABLET, FILM COATED ORAL
Qty: 90 TAB | Refills: 3 | Status: SHIPPED | OUTPATIENT
Start: 2018-04-12 | End: 2019-03-27 | Stop reason: SDUPTHER

## 2018-04-12 NOTE — ASSESSMENT & PLAN NOTE
His A1c improved to 7.2  His LDL is 47   He takes losartan, metformin 1000bid, glipizide 5 bid was increased to 10 bid, asa 81 mg   he had rare episodes of feeling shaky last month.   He has repeat A1c order.   He is going to get scheduled for eye exam through work.   He notes no ulcers on feet but reports neuropathy and RLS that bothers him.   Advised on magnesium supplement, regular exercise.

## 2018-04-12 NOTE — PROGRESS NOTES
Subjective:   Jamey Riojas is a 57 y.o. male here today for evaluation and management of:     Diabetes mellitus type 2 in obese  His A1c improved to 7.2  His LDL is 47   He takes losartan, metformin 1000bid, glipizide 5 bid was increased to 10 bid, asa 81 mg   he had rare episodes of feeling shaky last month.   He has repeat A1c order.   He is going to get scheduled for eye exam through work.   He notes no ulcers on feet but reports neuropathy and RLS that bothers him.   Advised on magnesium supplement, regular exercise.     HTN (hypertension)  Increased bp today 152/80 persistently above 140 systolic  Is taking losartan 100mg and amlodipine 10 mg, will add metoprolol 25 mg, his heart rate is in mid to upper 70s I think he will tolerate the beta blocker.          Current medicines (including changes today)  Current Outpatient Prescriptions   Medication Sig Dispense Refill   • amitriptyline (ELAVIL) 50 MG Tab Take 1 Tab by mouth every bedtime. 90 Tab 3   • lidocaine (LIDODERM) 5 % Patch Apply 1 Patch to skin as directed every 24 hours. 10 Patch 6   • metoprolol SR (TOPROL XL) 25 MG TABLET SR 24 HR Take 1 Tab by mouth every day. 90 Tab 3   • gabapentin (NEURONTIN) 300 MG Cap TAKE TWO CAPSULES BY MOUTH THREE TIMES DAILY 540 Cap 0   • tamsulosin (FLOMAX) 0.4 MG capsule TAKE TWO CAPSULES BY MOUTH ONCE DAILY 180 Cap 0   • nateglinide (STARLIX) 120 MG Tab TAKE ONE TABLET BY MOUTH THREE TIMES DAILY BEFORE MEAL(S) 270 Tab 0   • amLODIPine (NORVASC) 10 MG Tab TAKE ONE TABLET BY MOUTH ONCE DAILY 90 Tab 1   • sertraline (ZOLOFT) 50 MG Tab TAKE ONE TABLET BY MOUTH ONCE DAILY 90 Tab 0   • celecoxib (CELEBREX) 200 MG Cap TAKE ONE CAPSULE BY MOUTH TWICE DAILY 180 Cap 1   • losartan (COZAAR) 100 MG Tab TAKE ONE TABLET BY MOUTH ONCE DAILY 90 Tab 1   • finasteride (PROSCAR) 5 MG Tab TAKE ONE TABLET BY MOUTH ONCE DAILY 90 Tab 3   • trazodone (DESYREL) 100 MG Tab TAKE 1-2 TABLETS (100-200MG) BY MOUTH AT NIGHT FOR SLEEP IF  NEEDED 180 Tab 3   • glipiZIDE (GLUCOTROL) 10 MG Tab Take 1 Tab by mouth 2 times a day. 180 Tab 3   • metformin (GLUCOPHAGE) 1000 MG tablet TAKE ONE TABLET BY MOUTH TWICE DAILY WITH MEALS 180 Tab 3   • Naloxone HCl 4 MG/0.1ML Liquid Spray 1 Spray in nose as needed (For severe sleepiness or difficulty breathing from possible overdose. Call 911 after administration.). 1 Each 0   • Misc. Devices Misc One touch ultra test strips. Patient has DM type 2 and tests twice per day 100 Each 3   • Misc. Devices Misc One touch ultra lancets; patient has diabetes mellitus type 2 and test once per day 100 Device 3   • tizanidine (ZANAFLEX) 4 MG Tab Take 1 Tab by mouth every 6 hours as needed (back pain). (Patient taking differently: Take 4 mg by mouth every day.) 270 Tab 3   • lansoprazole (PREVACID) 30 MG CAPSULE DELAYED RELEASE Take 1 Cap by mouth every day. 30 Cap 3   • omeprazole (PRILOSEC) 20 MG delayed-release capsule TAKE ONE CAPSULE BY MOUTH ONCE DAILY 90 Cap 3   • nystatin (MYCOSTATIN) 069809 UNIT/GM Ointment Apply to decubitus ulcer 8 times per day in a thick layer 6 Tube 3   • Misc. Devices Misc CPAP supplies; fax to [facility] [phone]. 100 Device 3   • fluticasone (FLONASE) 50 MCG/ACT nasal spray Spray 2 Sprays in nose every day. Each Nostril 1 Bottle 3   • albuterol (VENTOLIN OR PROVENTIL) 108 (90 BASE) MCG/ACT AERS inhalation aerosol Inhale 2 Puffs by mouth every 6 hours as needed for Shortness of Breath. 8.5 g 3   • Multiple Vitamins-Minerals (MULTIVITAMIN PO) Take 1 Tab by mouth every day.       • Fexofenadine HCl (ALLEGRA PO) Take  by mouth as needed.     • Cholecalciferol (VITAMIN D) 2000 UNIT CAPS Take  by mouth every day.     • GLUCOSAMINE-CHONDROITIN-MSM PO Take 2 Tabs by mouth 2 Times a Day.     • aspirin 81 MG tablet Take 81 mg by mouth every day.       No current facility-administered medications for this visit.      He  has a past medical history of Anesthesia; Anxiety; Arthritis; Body aches (11/19/2014);  "Bronchitis (2009); Chickenpox; Diabetes; Dysuria (7/30/2014); Enlarged liver; GERD (gastroesophageal reflux disease) (12/21/2011); Heart burn; Hypertension; Indigestion; Influenza; Insomnia (12/21/2011); Nephrolithiasis (7/30/2014); Obesity; BRAIN (obstructive sleep apnea); Pain (12-14-12); Psychiatric problem; Sleep apnea; and Snoring.    ROS  No chest pain, no shortness of breath, no abdominal pain       Objective:     Blood pressure 152/80, pulse 78, temperature 36.4 °C (97.6 °F), resp. rate 16, height 1.854 m (6' 1\"), weight (!) 142.9 kg (315 lb), SpO2 95 %. Body mass index is 41.56 kg/m².   Physical Exam:  Constitutional: Alert, no distress.  Skin: Warm, dry, good turgor, no rashes in visible areas.  Eye: Equal, round and reactive, conjunctiva clear, lids normal.  ENMT: Lips without lesions, good dentition, oropharynx clear.  Neck: Trachea midline, no masses, no thyromegaly. No cervical or supraclavicular lymphadenopathy  Respiratory: Unlabored respiratory effort, lungs clear to auscultation, no wheezes, no ronchi.  Cardiovascular: Normal S1, S2, no murmur, no edema.  Abdomen: Soft, non-tender, no masses, no hepatosplenomegaly.  Psych: Alert and oriented x3, normal affect and mood.        Assessment and Plan:   The following treatment plan was discussed    1. Diabetes mellitus type 2 in obese (CMS-HCC)  Improving  Continue metformin, glipizide, asa, statin, losartan, regular foot inspections, weight loss, exercise  Recheck A1c.       2. Essential hypertension  Uncontrolled.   Pt attributed it to stress as he's waiting on job decision at the end of the month.   Continue losartan and amlodipine.   Add metoprolol  Encouraged weight loss, low salt diet and exercise.         Followup: Return for with wife: end of JUly: thurs/Frid.         "

## 2018-04-12 NOTE — ASSESSMENT & PLAN NOTE
Increased bp today 152/80 persistently above 140 systolic  Is taking losartan 100mg and amlodipine 10 mg, will add metoprolol 25 mg, his heart rate is in mid to upper 70s I think he will tolerate the beta blocker.

## 2018-05-01 DIAGNOSIS — F51.01 PRIMARY INSOMNIA: ICD-10-CM

## 2018-05-02 RX ORDER — ZOLPIDEM TARTRATE 10 MG/1
10 TABLET ORAL NIGHTLY PRN
Qty: 30 TAB | Refills: 2 | Status: SHIPPED
Start: 2018-05-02 | End: 2018-07-31

## 2018-05-08 DIAGNOSIS — F33.1 MODERATE EPISODE OF RECURRENT MAJOR DEPRESSIVE DISORDER (HCC): ICD-10-CM

## 2018-05-10 ENCOUNTER — SLEEP CENTER VISIT (OUTPATIENT)
Dept: SLEEP MEDICINE | Facility: MEDICAL CENTER | Age: 58
End: 2018-05-10
Payer: COMMERCIAL

## 2018-05-10 VITALS
HEART RATE: 77 BPM | RESPIRATION RATE: 16 BRPM | BODY MASS INDEX: 40.42 KG/M2 | TEMPERATURE: 97.7 F | WEIGHT: 305 LBS | SYSTOLIC BLOOD PRESSURE: 124 MMHG | HEIGHT: 73 IN | OXYGEN SATURATION: 94 % | DIASTOLIC BLOOD PRESSURE: 76 MMHG

## 2018-05-10 DIAGNOSIS — G47.33 OBSTRUCTIVE SLEEP APNEA: ICD-10-CM

## 2018-05-10 DIAGNOSIS — E66.01 MORBID OBESITY WITH BMI OF 40.0-44.9, ADULT (HCC): ICD-10-CM

## 2018-05-10 PROCEDURE — 99213 OFFICE O/P EST LOW 20 MIN: CPT | Performed by: NURSE PRACTITIONER

## 2018-05-10 RX ORDER — ZOLPIDEM TARTRATE 10 MG/1
TABLET ORAL
Refills: 2 | OUTPATIENT
Start: 2018-05-10

## 2018-05-10 NOTE — PROGRESS NOTES
CC:  Here for f/u sleep issues as listed below    HPI:   Jamey presents today for follow up obstructive sleep apnea.  PSG from 2012 indicated an AHI of 36 and low oxygenation of 83%.  Currently he is being treated with autoCPAP @ [id number].  Compliance download from the dates 4/10/2018 - 5/9/2018 indicates he is wearing the device 100% for an avg of 8 hours and 13 minutes per night with a reduced AHI of 0.6.  Avg pressure is 13.3 to a max of 14.6.     He does tolerate pressure and mask well.  He wakes up refreshed and is less tired throughout the day. They deny morning H/A. He sleeps better overall. He will continue to clean supplies weekly and change them as insurance allows.  He is in the process of workmans comp and disability.     Patient Active Problem List    Diagnosis Date Noted   • Degeneration of cervical intervertebral disc 11/30/2012     Priority: High   • Obstructive sleep apnea 12/02/2012     Priority: Medium   • Iron deficiency anemia 12/02/2012     Priority: Medium   • HTN (hypertension) 12/21/2011     Priority: Medium   • Major depression 12/02/2012     Priority: Low   • Vitamin D deficiency disease 01/12/2012     Priority: Low   • Morbid obesity with BMI of 40.0-44.9, adult ([facility]) 05/10/2018   • Fever and chills 02/13/2018   • Diarrhea of presumed infectious origin 02/13/2018   • H/O colonoscopy with polypectomy 08/17/2017   • Chronic use of opiate drugs therapeutic purposes 08/11/2016   • Lumbar stenosis with neurogenic claudication 12/31/2015   • Rib pain on left side 10/22/2015   • Anemia of unknown etiology 05/21/2015   • Sinusitis 04/21/2015   • Body aches 11/19/2014   • Nephrolithiasis 07/30/2014   • Dysuria 07/30/2014   • BPH (benign prostatic hyperplasia) 12/20/2013   • Degeneration of lumbar or lumbosacral intervertebral disc 12/30/2012   • Chronic pain of left knee 09/18/2012   • Diabetes mellitus type 2 in obese ([facility]) 12/21/2011   • Insomnia 12/21/2011   • GERD (gastroesophageal reflux  disease) 12/21/2011   • Chronic back pain 12/21/2011       Past Medical History:   Diagnosis Date   • Anesthesia     mother has hard time waking up   • Anxiety    • Arthritis     knees and shoulder joints and hands   • Body aches 11/19/2014   • Bronchitis 2009   • Chickenpox    • Diabetes     oral medication and diet   • Dysuria 7/30/2014   • Enlarged liver    • GERD (gastroesophageal reflux disease) 12/21/2011   • Heart burn    • Hypertension    • Indigestion    • Influenza    • Insomnia 12/21/2011   • Nephrolithiasis 7/30/2014    This is a recurring problem. Patient states that he was evaluated for a kidney stone 2012. CT scan showed a stone that was not obstructing at that time. He has been having increasing right-sided back pain for approximately the last month. He has not had any fever or chills.   • Obesity    • BRAIN (obstructive sleep apnea)    • Pain 12-14-12    lower back,neck, 5/10   • Psychiatric problem     depression   • Sleep apnea     CPAP, O2 @ HS 2.5 liters   • Snoring        Past Surgical History:   Procedure Laterality Date   • LUMBAR FUSION POSTERIOR  12/30/2012    Performed by Benjamin Kumar M.D. at SURGERY [facility]   • CERVICAL DISK AND FUSION ANTERIOR  11/30/2012    Performed by Benjamin Kumar M.D. at [facility]   • HARDWARE REMOVAL ORTHO  11/30/2012    Performed by Benjamin Kumar M.D. at [facility]   • KNEE ARTHROSCOPY  9/18/2012    Performed by Servando Long M.D. at [facility]   • MEDIAL MENISCECTOMY  9/18/2012    Performed by Servando Long M.D. at [facility]   • DEBRIDEMENT  9/18/2012    Performed by Servando Long M.D. at [facility]   • LUMBAR FUSION POSTERIOR  6/18/2010    Performed by BENJAMIN KUMAR at [facility]   • LUMBAR DECOMPRESSION  6/18/2010    Performed by BENJAMIN KUMAR at [facility]   • KNEE ARTHROSCOPY  10/20/2009    Performed by SERVANDO LONG at [facility]  [facility]S   • MEDIAL MENISCECTOMY  10/20/2009    Performed by SERVANDO LONG at SURGERY [facility]S   • CERVICAL FUSION POSTERIOR  9/2007    c -3-4-5    • CHOLECYSTECTOMY  1984    open   • FOOT SURGERY      left  Farrar's neuroma and heel spur       Family History   Problem Relation Age of Onset   • Other Mother      osteopenia   • Hypertension Mother    • Psychiatry Mother    • Other Father      brain tumor   • Heart Attack Maternal Grandfather    • Sleep Apnea Neg Hx        Social History     Social History   • Marital status:      Spouse name: N/A   • Number of children: N/A   • Years of education: N/A     Occupational History   • Not on file.     Social History Main Topics   • Smoking status: Never Smoker   • Smokeless tobacco: Never Used   • Alcohol use No   • Drug use: No   • Sexual activity: Yes     Partners: Female     Other Topics Concern   • Not on file     Social History Narrative   • No narrative on file       Current Outpatient Prescriptions   Medication Sig Dispense Refill   • sertraline (ZOLOFT) 50 MG Tab TAKE 1 TABLET BY MOUTH ONCE DAILY 90 Tab 0   • amitriptyline (ELAVIL) 50 MG Tab Take 1 Tab by mouth every bedtime. 90 Tab 3   • metoprolol SR (TOPROL XL) 25 MG TABLET SR 24 HR Take 1 Tab by mouth every day. 90 Tab 3   • gabapentin (NEURONTIN) 300 MG Cap TAKE TWO CAPSULES BY MOUTH THREE TIMES DAILY 540 Cap 0   • tamsulosin (FLOMAX) 0.4 MG capsule TAKE TWO CAPSULES BY MOUTH ONCE DAILY 180 Cap 0   • nateglinide (STARLIX) 120 MG Tab TAKE ONE TABLET BY MOUTH THREE TIMES DAILY BEFORE MEAL(S) 270 Tab 0   • amLODIPine (NORVASC) 10 MG Tab TAKE ONE TABLET BY MOUTH ONCE DAILY 90 Tab 1   • celecoxib (CELEBREX) 200 MG Cap TAKE ONE CAPSULE BY MOUTH TWICE DAILY 180 Cap 1   • losartan (COZAAR) 100 MG Tab TAKE ONE TABLET BY MOUTH ONCE DAILY 90 Tab 1   • finasteride (PROSCAR) 5 MG Tab TAKE ONE TABLET BY MOUTH ONCE DAILY 90 Tab 3   • trazodone (DESYREL) 100 MG Tab TAKE 1-2 TABLETS (100-200MG) BY  MOUTH AT NIGHT FOR SLEEP IF NEEDED 180 Tab 3   • glipiZIDE (GLUCOTROL) 10 MG Tab Take 1 Tab by mouth 2 times a day. 180 Tab 3   • metformin (GLUCOPHAGE) 1000 MG tablet TAKE ONE TABLET BY MOUTH TWICE DAILY WITH MEALS 180 Tab 3   • tizanidine (ZANAFLEX) 4 MG Tab Take 1 Tab by mouth every 6 hours as needed (back pain). (Patient taking differently: Take 4 mg by mouth every day.) 270 Tab 3   • lansoprazole (PREVACID) 30 MG CAPSULE DELAYED RELEASE Take 1 Cap by mouth every day. 30 Cap 3   • omeprazole (PRILOSEC) 20 MG delayed-release capsule TAKE ONE CAPSULE BY MOUTH ONCE DAILY 90 Cap 3   • fluticasone (FLONASE) 50 MCG/ACT nasal spray Spray 2 Sprays in nose every day. Each Nostril 1 Bottle 3   • Multiple Vitamins-Minerals (MULTIVITAMIN PO) Take 1 Tab by mouth every day.       • Cholecalciferol (VITAMIN D) 2000 UNIT CAPS Take  by mouth every day.     • GLUCOSAMINE-CHONDROITIN-MSM PO Take 2 Tabs by mouth 2 Times a Day.     • aspirin 81 MG tablet Take 81 mg by mouth every day.     • zolpidem (AMBIEN) 10 MG Tab Take 1 Tab by mouth at bedtime as needed for Sleep for up to 90 days. 30 Tab 2   • lidocaine (LIDODERM) 5 % Patch Apply 1 Patch to skin as directed every 24 hours. (Patient not taking: Reported on 5/10/2018) 10 Patch 6   • Naloxone HCl 4 MG/0.1ML Liquid Spray 1 Spray in nose as needed (For severe sleepiness or difficulty breathing from possible overdose. Call 911 after administration.). (Patient not taking: Reported on 5/10/2018) 1 Each 0   • Misc. Devices Misc One touch ultra test strips. Patient has DM type 2 and tests twice per day 100 Each 3   • Misc. Devices Misc One touch ultra lancets; patient has diabetes mellitus type 2 and test once per day 100 Device 3   • nystatin (MYCOSTATIN) 863871 UNIT/GM Ointment Apply to decubitus ulcer 8 times per day in a thick layer (Patient not taking: Reported on 5/10/2018) 6 Tube 3   • Misc. Devices Misc CPAP supplies; fax to [place] [phone]. 100 Device 3   • albuterol  "(VENTOLIN OR PROVENTIL) 108 (90 BASE) MCG/ACT AERS inhalation aerosol Inhale 2 Puffs by mouth every 6 hours as needed for Shortness of Breath. 8.5 g 3   • Fexofenadine HCl (ALLEGRA PO) Take  by mouth as needed.       No current facility-administered medications for this visit.           Allergies: Penicillins; Phenergan [promethazine hcl]; and Influenza virus vacc      ROS   Gen: Denies fever, chills, unintentional weight loss, fatigue  Resp:Denies Dyspnea  CV: Denies chest pain, chest tightness  Sleep:Denies morning headache, insomnia, daytime somnolence, snoring, gasping for air, apnea  Neuro: Denies frequent headaches, weakness, dizziness  See HPI.  All other systems reviewed and negative        Vital signs for this encounter:  Vitals:    05/10/18 1006   Height: 1.854 m (6' 1\")   Weight: (!) 138.3 kg (305 lb)   Weight % change since last entry.: 0 %   BP: 124/76   Pulse: 77   BMI (Calculated): 40.24   Resp: 16   Temp: 36.5 °C (97.7 °F)   O2 sat % room air: 94 %                   Physical Exam:   Gen:         Alert and oriented, No apparent distress.   Neck:        No Lymphadenopathy.  Lungs:     Clear to auscultation bilaterally.    CV:          Regular rate and rhythm. No murmurs, rubs or gallops.   Abd:         Soft non tender, non distended.            Ext:          No clubbing, cyanosis, edema.    Assessment   1. Morbid obesity with BMI of 40.0-44.9, adult (HCC)  DME MASK AND SUPPLIES   2. Obstructive sleep apnea  DME MASK AND SUPPLIES       PLAN:   Patient Instructions   1) Continue autoCPAP at [id number]  2) Clean mask and supplies weekly and change them as insurance allows  4) Vaccines: Up to date with Pneumovax 23  5) Return in about 1 year (around 5/10/2019) for follow up with LENORE Rhoades, if not sooner, review of symptoms, Compliance.          "

## 2018-05-10 NOTE — PATIENT INSTRUCTIONS
1) Continue autoCPAP at [id number]  2) Clean mask and supplies weekly and change them as insurance allows  4) Vaccines: Up to date with Pneumovax 23  5) Return in about 1 year (around 5/10/2019) for follow up with LENORE Rhoades, if not sooner, review of symptoms, Compliance.

## 2018-06-07 ENCOUNTER — OFFICE VISIT (OUTPATIENT)
Dept: URGENT CARE | Facility: PHYSICIAN GROUP | Age: 58
End: 2018-06-07
Payer: COMMERCIAL

## 2018-06-07 VITALS
RESPIRATION RATE: 18 BRPM | BODY MASS INDEX: 40.56 KG/M2 | OXYGEN SATURATION: 97 % | HEIGHT: 73 IN | TEMPERATURE: 98 F | DIASTOLIC BLOOD PRESSURE: 72 MMHG | HEART RATE: 84 BPM | WEIGHT: 306 LBS | SYSTOLIC BLOOD PRESSURE: 120 MMHG

## 2018-06-07 DIAGNOSIS — J40 BRONCHITIS: ICD-10-CM

## 2018-06-07 DIAGNOSIS — J02.9 SORE THROAT: ICD-10-CM

## 2018-06-07 DIAGNOSIS — J01.40 ACUTE NON-RECURRENT PANSINUSITIS: ICD-10-CM

## 2018-06-07 LAB
INT CON NEG: NORMAL
INT CON POS: NORMAL
S PYO AG THROAT QL: NORMAL

## 2018-06-07 PROCEDURE — 87880 STREP A ASSAY W/OPTIC: CPT | Performed by: NURSE PRACTITIONER

## 2018-06-07 PROCEDURE — 99213 OFFICE O/P EST LOW 20 MIN: CPT | Performed by: NURSE PRACTITIONER

## 2018-06-07 RX ORDER — BENZONATATE 100 MG/1
100 CAPSULE ORAL 3 TIMES DAILY PRN
Qty: 30 CAP | Refills: 0 | Status: SHIPPED | OUTPATIENT
Start: 2018-06-07 | End: 2018-07-26

## 2018-06-07 RX ORDER — DOXYCYCLINE HYCLATE 100 MG
100 TABLET ORAL 2 TIMES DAILY
Qty: 20 TAB | Refills: 0 | Status: SHIPPED | OUTPATIENT
Start: 2018-06-07 | End: 2018-07-26

## 2018-06-07 ASSESSMENT — ENCOUNTER SYMPTOMS
SINUS PAIN: 1
DIARRHEA: 0
HEADACHES: 0
SORE THROAT: 1
EYES NEGATIVE: 1
GASTROINTESTINAL NEGATIVE: 1
NEUROLOGICAL NEGATIVE: 1
COUGH: 1
MUSCULOSKELETAL NEGATIVE: 1
WHEEZING: 0
CARDIOVASCULAR NEGATIVE: 1
SHORTNESS OF BREATH: 0
NAUSEA: 0
VOMITING: 0
DIZZINESS: 0
CONSTITUTIONAL NEGATIVE: 1
FEVER: 0

## 2018-06-07 NOTE — PROGRESS NOTES
Subjective:      Jamey Riojas is a 57 y.o. male who presents with Pharyngitis (Possible strep)            HPI  Patient presents with his wife complaining of cough ×6 days  Patient also complains of a sore throat  Sinus congestion and tenderness  Tactile temperature  History of sinus infections and bronchitis  No chest pain or shortness of breath  No n/v/d  Missed last few days of work      Medication tried musinex  Allergic to flu vaccine    PMH:  has a past medical history of Anesthesia; Anxiety; Arthritis; Body aches (11/19/2014); Bronchitis (2009); Chickenpox; Diabetes; Dysuria (7/30/2014); Enlarged liver; GERD (gastroesophageal reflux disease) (12/21/2011); Heart burn; Hypertension; Indigestion; Influenza; Insomnia (12/21/2011); Nephrolithiasis (7/30/2014); Obesity; BRAIN (obstructive sleep apnea); Pain (12-14-12); Psychiatric problem; Sleep apnea; and Snoring.  MEDS:   Current Outpatient Prescriptions:   •  doxycycline (VIBRAMYCIN) 100 MG Tab, Take 1 Tab by mouth 2 times a day., Disp: 20 Tab, Rfl: 0  •  benzonatate (TESSALON) 100 MG Cap, Take 1 Cap by mouth 3 times a day as needed for Cough., Disp: 30 Cap, Rfl: 0  •  sertraline (ZOLOFT) 50 MG Tab, TAKE 1 TABLET BY MOUTH ONCE DAILY, Disp: 90 Tab, Rfl: 0  •  zolpidem (AMBIEN) 10 MG Tab, Take 1 Tab by mouth at bedtime as needed for Sleep for up to 90 days., Disp: 30 Tab, Rfl: 2  •  amitriptyline (ELAVIL) 50 MG Tab, Take 1 Tab by mouth every bedtime., Disp: 90 Tab, Rfl: 3  •  metoprolol SR (TOPROL XL) 25 MG TABLET SR 24 HR, Take 1 Tab by mouth every day., Disp: 90 Tab, Rfl: 3  •  gabapentin (NEURONTIN) 300 MG Cap, TAKE TWO CAPSULES BY MOUTH THREE TIMES DAILY, Disp: 540 Cap, Rfl: 0  •  tamsulosin (FLOMAX) 0.4 MG capsule, TAKE TWO CAPSULES BY MOUTH ONCE DAILY, Disp: 180 Cap, Rfl: 0  •  nateglinide (STARLIX) 120 MG Tab, TAKE ONE TABLET BY MOUTH THREE TIMES DAILY BEFORE MEAL(S), Disp: 270 Tab, Rfl: 0  •  amLODIPine (NORVASC) 10 MG Tab, TAKE ONE TABLET BY MOUTH  ONCE DAILY, Disp: 90 Tab, Rfl: 1  •  celecoxib (CELEBREX) 200 MG Cap, TAKE ONE CAPSULE BY MOUTH TWICE DAILY, Disp: 180 Cap, Rfl: 1  •  losartan (COZAAR) 100 MG Tab, TAKE ONE TABLET BY MOUTH ONCE DAILY, Disp: 90 Tab, Rfl: 1  •  finasteride (PROSCAR) 5 MG Tab, TAKE ONE TABLET BY MOUTH ONCE DAILY, Disp: 90 Tab, Rfl: 3  •  trazodone (DESYREL) 100 MG Tab, TAKE 1-2 TABLETS (100-200MG) BY MOUTH AT NIGHT FOR SLEEP IF NEEDED, Disp: 180 Tab, Rfl: 3  •  glipiZIDE (GLUCOTROL) 10 MG Tab, Take 1 Tab by mouth 2 times a day., Disp: 180 Tab, Rfl: 3  •  metformin (GLUCOPHAGE) 1000 MG tablet, TAKE ONE TABLET BY MOUTH TWICE DAILY WITH MEALS, Disp: 180 Tab, Rfl: 3  •  Misc. Devices Misc, One touch ultra test strips. Patient has DM type 2 and tests twice per day, Disp: 100 Each, Rfl: 3  •  Misc. Devices Misc, One touch ultra lancets; patient has diabetes mellitus type 2 and test once per day, Disp: 100 Device, Rfl: 3  •  tizanidine (ZANAFLEX) 4 MG Tab, Take 1 Tab by mouth every 6 hours as needed (back pain). (Patient taking differently: Take 4 mg by mouth every day.), Disp: 270 Tab, Rfl: 3  •  lansoprazole (PREVACID) 30 MG CAPSULE DELAYED RELEASE, Take 1 Cap by mouth every day., Disp: 30 Cap, Rfl: 3  •  omeprazole (PRILOSEC) 20 MG delayed-release capsule, TAKE ONE CAPSULE BY MOUTH ONCE DAILY, Disp: 90 Cap, Rfl: 3  •  Misc. Devices Misc, CPAP supplies; fax to [facility] [phone]., Disp: 100 Device, Rfl: 3  •  fluticasone (FLONASE) 50 MCG/ACT nasal spray, Spray 2 Sprays in nose every day. Each Nostril, Disp: 1 Bottle, Rfl: 3  •  Multiple Vitamins-Minerals (MULTIVITAMIN PO), Take 1 Tab by mouth every day.  , Disp: , Rfl:   •  Fexofenadine HCl (ALLEGRA PO), Take  by mouth as needed., Disp: , Rfl:   •  Cholecalciferol (VITAMIN D) 2000 UNIT CAPS, Take  by mouth every day., Disp: , Rfl:   •  GLUCOSAMINE-CHONDROITIN-MSM PO, Take 2 Tabs by mouth 2 Times a Day., Disp: , Rfl:   •  aspirin 81 MG tablet, Take 81 mg by mouth every day., Disp: , Rfl:   •  " albuterol (VENTOLIN OR PROVENTIL) 108 (90 BASE) MCG/ACT AERS inhalation aerosol, Inhale 2 Puffs by mouth every 6 hours as needed for Shortness of Breath., Disp: 8.5 g, Rfl: 3  ALLERGIES:   Allergies   Allergen Reactions   • Penicillins Swelling     \"Tongue swells up\"   • Phenergan [Promethazine Hcl]      \"Jittery and hallucinating\"   • Influenza Virus Vacc      \"deathly ill\"     SURGHX:   Past Surgical History:   Procedure Laterality Date   • LUMBAR FUSION POSTERIOR  12/30/2012    Performed by Benjamin Kumar M.D. at SURGERY [facility]   • CERVICAL DISK AND FUSION ANTERIOR  11/30/2012    Performed by Benjamin Kumar M.D. at [facility]   • HARDWARE REMOVAL ORTHO  11/30/2012    Performed by Benjamin Kumar M.D. at [facility]   • KNEE ARTHROSCOPY  9/18/2012    Performed by James Francis M.D. at SURGERY [facility]   • MEDIAL MENISCECTOMY  9/18/2012    Performed by James Francis M.D. at SURGERY [facility]   • DEBRIDEMENT  9/18/2012    Performed by James Francis M.D. at SURGERY [facility]   • LUMBAR FUSION POSTERIOR  6/18/2010    Performed by BENJAMIN KUMAR at [facility]   • LUMBAR DECOMPRESSION  6/18/2010    Performed by BENJAMIN KUMAR at SURGERY [facility]   • KNEE ARTHROSCOPY  10/20/2009    Performed by JAMES FRANCIS at SURGERY [facility]   • MEDIAL MENISCECTOMY  10/20/2009    Performed by JAMES FRANCIS at [facility]   • CERVICAL FUSION POSTERIOR  9/2007    c -3-4-5    • CHOLECYSTECTOMY  1984    open   • FOOT SURGERY      left  Farrar's neuroma and heel spur     SOCHX:  reports that he has never smoked. He has never used smokeless tobacco. He reports that he does not drink alcohol or use drugs.  FH: family history includes Heart Attack in his maternal grandfather; Hypertension in his mother; Other in his father and mother; Psychiatry in his mother.      Review of Systems   Constitutional: Negative.  Negative " "for fever.   HENT: Positive for congestion, sinus pain and sore throat.    Eyes: Negative.    Respiratory: Positive for cough. Negative for shortness of breath and wheezing.    Cardiovascular: Negative.  Negative for chest pain.   Gastrointestinal: Negative.  Negative for diarrhea, nausea and vomiting.   Genitourinary: Negative.    Musculoskeletal: Negative.    Skin: Negative.  Negative for rash.   Neurological: Negative.  Negative for dizziness and headaches.   Endo/Heme/Allergies: Negative.           Objective:     /72   Pulse 84   Temp 36.7 °C (98 °F)   Resp 18   Ht 1.854 m (6' 1\")   Wt (!) 138.8 kg (306 lb)   SpO2 97%   BMI 40.37 kg/m²      Physical Exam   Constitutional: He is oriented to person, place, and time. He appears well-developed and well-nourished.   HENT:   Head: Normocephalic and atraumatic.   Right Ear: Hearing, tympanic membrane, external ear and ear canal normal.   Left Ear: Hearing, tympanic membrane, external ear and ear canal normal.   Nose: Mucosal edema and rhinorrhea present. Right sinus exhibits maxillary sinus tenderness and frontal sinus tenderness. Left sinus exhibits maxillary sinus tenderness and frontal sinus tenderness.   Mouth/Throat: Uvula is midline and mucous membranes are normal. Posterior oropharyngeal edema and posterior oropharyngeal erythema present. No oropharyngeal exudate. Tonsils are 0 on the right. Tonsils are 0 on the left.   Eyes: Conjunctivae are normal.   Neck: Normal range of motion. Neck supple.   Cardiovascular: Normal rate, regular rhythm and normal heart sounds.    Pulmonary/Chest: Effort normal and breath sounds normal.   97% on RA  Scattered rhonchi cleared with coughing  No wheezing no rales  No amu     Abdominal: Soft. Bowel sounds are normal.   Musculoskeletal: Normal range of motion.   Neurological: He is alert and oriented to person, place, and time.   Skin: Skin is warm and dry. Capillary refill takes less than 2 seconds.   Psychiatric: He " has a normal mood and affect. His behavior is normal. Judgment and thought content normal.               Assessment/Plan:     1. Bronchitis  doxycycline (VIBRAMYCIN) 100 MG Tab    benzonatate (TESSALON) 100 MG Cap   2. Acute non-recurrent pansinusitis  doxycycline (VIBRAMYCIN) 100 MG Tab   3. Sore throat  POCT Rapid Strep A     Discussed pt symptoms in detail  Stressed adequate hydration  Start medication as directed  Discussed medication and side effects  Can use tylenol PRN fever or discomfort  Rapid strep was negative  Monitor and f/u for worsening or persistent symptoms

## 2018-06-07 NOTE — LETTER
June 7, 2018    To Whom It May Concern:         This is confirmation that Jamey Riojas attended his scheduled appointment with FERNANDO Williamson on 6/07/18. Patient was evaluated today in the urgent care for cough and cold symptoms that began 6 days ago.          If you have any questions please do not hesitate to call me at the phone number listed below.    Sincerely,          LION WilliamsonRThoN.  [phone]

## 2018-06-27 ENCOUNTER — OFFICE VISIT (OUTPATIENT)
Dept: URGENT CARE | Facility: PHYSICIAN GROUP | Age: 58
End: 2018-06-27
Payer: COMMERCIAL

## 2018-06-27 VITALS
HEART RATE: 100 BPM | RESPIRATION RATE: 16 BRPM | TEMPERATURE: 98.3 F | DIASTOLIC BLOOD PRESSURE: 70 MMHG | OXYGEN SATURATION: 95 % | BODY MASS INDEX: 41.72 KG/M2 | HEIGHT: 72 IN | SYSTOLIC BLOOD PRESSURE: 124 MMHG | WEIGHT: 308 LBS

## 2018-06-27 DIAGNOSIS — J01.00 ACUTE MAXILLARY SINUSITIS, RECURRENCE NOT SPECIFIED: ICD-10-CM

## 2018-06-27 DIAGNOSIS — R05.9 COUGH: ICD-10-CM

## 2018-06-27 LAB
INT CON NEG: NEGATIVE
INT CON POS: POSITIVE
S PYO AG THROAT QL: NEGATIVE

## 2018-06-27 PROCEDURE — 99214 OFFICE O/P EST MOD 30 MIN: CPT | Performed by: PHYSICIAN ASSISTANT

## 2018-06-27 PROCEDURE — 87880 STREP A ASSAY W/OPTIC: CPT | Performed by: PHYSICIAN ASSISTANT

## 2018-06-27 RX ORDER — METHYLPREDNISOLONE 4 MG/1
TABLET ORAL
Qty: 21 TAB | Refills: 0 | Status: SHIPPED | OUTPATIENT
Start: 2018-06-27 | End: 2018-07-26

## 2018-06-27 RX ORDER — SULFAMETHOXAZOLE AND TRIMETHOPRIM 800; 160 MG/1; MG/1
1 TABLET ORAL 2 TIMES DAILY
Qty: 20 TAB | Refills: 0 | Status: SHIPPED | OUTPATIENT
Start: 2018-06-27 | End: 2018-07-07

## 2018-06-27 ASSESSMENT — ENCOUNTER SYMPTOMS
SORE THROAT: 0
VOMITING: 0
SINUS PRESSURE: 1
PALPITATIONS: 0
TINGLING: 0
WHEEZING: 0
SENSORY CHANGE: 0
FEVER: 0
ABDOMINAL PAIN: 0
HEADACHES: 1
SPUTUM PRODUCTION: 1
SINUS PAIN: 1
NAUSEA: 0
DIAPHORESIS: 1
COUGH: 1
FOCAL WEAKNESS: 0
CHILLS: 0
SHORTNESS OF BREATH: 0
NECK PAIN: 0

## 2018-06-28 NOTE — PROGRESS NOTES
Subjective:      Jamey Riojas is a 58 y.o. male who presents with Ear Fullness (R ear) and Sore Throat (on and off since Sunday )            Sinus Problem   This is a new problem. Episode onset: 3 weeks  The problem is unchanged. Maximum temperature: subjective. Associated symptoms include congestion, coughing (unproductive with chest congestion), diaphoresis, headaches and sinus pressure. Pertinent negatives include no chills, ear pain, neck pain, shortness of breath, sneezing or sore throat. (Right eye with drainage/crust ) Treatments tried: Doxycycline x 7 days 3 weeks ago, Mucinex DM, Flonase. The treatment provided mild (mild relief- intitally. returned ) relief.     Past Medical History:   Diagnosis Date   • Anesthesia     mother has hard time waking up   • Anxiety    • Arthritis     knees and shoulder joints and hands   • Body aches 11/19/2014   • Bronchitis 2009   • Chickenpox    • Diabetes     oral medication and diet   • Dysuria 7/30/2014   • Enlarged liver    • GERD (gastroesophageal reflux disease) 12/21/2011   • Heart burn    • Hypertension    • Indigestion    • Influenza    • Insomnia 12/21/2011   • Nephrolithiasis 7/30/2014    This is a recurring problem. Patient states that he was evaluated for a kidney stone 2012. CT scan showed a stone that was not obstructing at that time. He has been having increasing right-sided back pain for approximately the last month. He has not had any fever or chills.   • Obesity    • BRAIN (obstructive sleep apnea)    • Pain 12-14-12    lower back,neck, 5/10   • Psychiatric problem     depression   • Sleep apnea     CPAP, O2 @ HS 2.5 liters   • Snoring        Past Surgical History:   Procedure Laterality Date   • LUMBAR FUSION POSTERIOR  12/30/2012    Performed by Mohsen Chan M.D. at SURGERY [facility]   • CERVICAL DISK AND FUSION ANTERIOR  11/30/2012    Performed by Mohsen Chan M.D. at SURGERY [facility]   • HARDWARE REMOVAL ORTHO  11/30/2012     "Performed by Benjamin Kumar M.D. at SURGERY [facility]   • KNEE ARTHROSCOPY  9/18/2012    Performed by James Francis M.D. at SURGERY [facility]   • MEDIAL MENISCECTOMY  9/18/2012    Performed by James Francis M.D. at SURGERY [facility]   • DEBRIDEMENT  9/18/2012    Performed by James Francis M.D. at SURGERY [facility]   • LUMBAR FUSION POSTERIOR  6/18/2010    Performed by BENJAMIN KUMAR at SURGERY [facility]   • LUMBAR DECOMPRESSION  6/18/2010    Performed by BENJAMIN KUMAR at SURGERY [facility]   • KNEE ARTHROSCOPY  10/20/2009    Performed by JAMES FRANCIS at SURGERY [facility]   • MEDIAL MENISCECTOMY  10/20/2009    Performed by JAMES FRANCIS at SURGERY [facility]   • CERVICAL FUSION POSTERIOR  9/2007    c -3-4-5    • CHOLECYSTECTOMY  1984    open   • FOOT SURGERY      left  Farrar's neuroma and heel spur       Family History   Problem Relation Age of Onset   • Other Mother      osteopenia   • Hypertension Mother    • Psychiatry Mother    • Other Father      brain tumor   • Heart Attack Maternal Grandfather    • Sleep Apnea Neg Hx        Allergies   Allergen Reactions   • Penicillins Swelling     \"Tongue swells up\"   • Phenergan [Promethazine Hcl]      \"Jittery and hallucinating\"   • Influenza Virus Vacc      \"deathly ill\"       Medications, Allergies, and current problem list reviewed today in Epic    Review of Systems   Constitutional: Positive for diaphoresis. Negative for chills, fever and malaise/fatigue.   HENT: Positive for congestion, sinus pain and sinus pressure. Negative for ear discharge, ear pain, sneezing and sore throat.    Respiratory: Positive for cough (unproductive with chest congestion) and sputum production. Negative for shortness of breath and wheezing.    Cardiovascular: Negative for chest pain, palpitations and leg swelling.   Gastrointestinal: Negative for abdominal pain, nausea and vomiting.   Musculoskeletal: Negative for " neck pain.   Neurological: Positive for headaches. Negative for tingling, sensory change and focal weakness.     All other systems reviewed and are negative.        Objective:     /70   Pulse 100   Temp 36.8 °C (98.3 °F)   Resp 16   Ht 1.829 m (6')   Wt (!) 139.7 kg (308 lb)   SpO2 95%   BMI 41.77 kg/m²      Physical Exam   Constitutional: He is oriented to person, place, and time. He appears well-developed and well-nourished. No distress.   HENT:   Head: Normocephalic and atraumatic.   Right Ear: Tympanic membrane, external ear and ear canal normal.   Left Ear: Tympanic membrane, external ear and ear canal normal.   Nose: Mucosal edema and rhinorrhea present. Right sinus exhibits maxillary sinus tenderness. Left sinus exhibits maxillary sinus tenderness.   Mouth/Throat: Uvula is midline, oropharynx is clear and moist and mucous membranes are normal.   Eyes: Conjunctivae are normal.   Neck: Neck supple.   Cardiovascular: Normal rate, regular rhythm and normal heart sounds.  Exam reveals no gallop and no friction rub.    No murmur heard.  Pulmonary/Chest: Effort normal and breath sounds normal. No respiratory distress. He has no wheezes. He has no rales.   Lymphadenopathy:     He has no cervical adenopathy.   Neurological: He is alert and oriented to person, place, and time. No cranial nerve deficit.   Skin: Skin is warm and dry. No rash noted.   Psychiatric: He has a normal mood and affect. His behavior is normal. Judgment and thought content normal.               Assessment/Plan:     1. Acute maxillary sinusitis, recurrence not specified  sulfamethoxazole-trimethoprim (BACTRIM DS) 800-160 MG tablet    POCT Rapid Strep A   2. Cough  MethylPREDNISolone (MEDROL DOSEPAK) 4 MG Tablet Therapy Pack     poct strep- negative      Current Outpatient Prescriptions:   •  sulfamethoxazole-trimethoprim (BACTRIM DS) 800-160 MG tablet, Take 1 Tab by mouth 2 times a day for 10 days., Disp: 20 Tab, Rfl: 0  - encouraged  daily probiotic use with antibiotic    •  MethylPREDNISolone (MEDROL DOSEPAK) 4 MG Tablet Therapy Pack, Take as directed on package. 1 pack., Disp: 21 Tab, Rfl: 0    Encouraged fluids, saline rinses, Flonase  Monitor sugars while taking steroid.     Differential diagnoses, Supportive care, and indications for immediate follow-up discussed with patient.   Instructed to return to clinic or nearest emergency department for any change in condition, further concerns, or worsening of symptoms.    The patient demonstrated a good understanding and agreed with the treatment plan.    Jhoana Patrick P.A.-C.

## 2018-07-03 DIAGNOSIS — N40.1 BENIGN NODULAR PROSTATIC HYPERPLASIA WITH LOWER URINARY TRACT SYMPTOMS: ICD-10-CM

## 2018-07-05 RX ORDER — TAMSULOSIN HYDROCHLORIDE 0.4 MG/1
CAPSULE ORAL
Qty: 180 CAP | Refills: 0 | Status: SHIPPED | OUTPATIENT
Start: 2018-07-05 | End: 2018-10-02 | Stop reason: SDUPTHER

## 2018-07-05 RX ORDER — GABAPENTIN 300 MG/1
CAPSULE ORAL
Qty: 540 CAP | Refills: 0 | Status: SHIPPED | OUTPATIENT
Start: 2018-07-05 | End: 2018-10-02 | Stop reason: SDUPTHER

## 2018-07-05 NOTE — TELEPHONE ENCOUNTER
Was the patient seen in the last year in this department? Yes     Does patient have an active prescription for medications requested? No     Received Request Via: Pharmacy      Pt met protocol?: Yes    OV 4/18

## 2018-07-05 NOTE — TELEPHONE ENCOUNTER
Was the patient seen in the last year in this department? Yes     Does patient have an active prescription for medications requested? No     Received Request Via: Pharmacy      Pt met protocol?: Yes    OV  4/18  A1C 1/18

## 2018-07-09 NOTE — ASSESSMENT & PLAN NOTE
Patient is a 56-year-old male who's had a long history of chronic back problems. He had surgery in January 2016 on his back by Dr. Chan for chronic issues. 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 He had the onset of excruciating back pain and right knee pain. This was a Worker's Comp. issue and so it took several months for him to be able to have an MRI and see his neurosurgeon. He just underwent surgery by Dr. Chan this month and is recovering. He notes his pain is better but he does continue on narcotics. From our practice has received up to 6 tablets of Percocet per day previously. Neurosurgery has taken over his pain medications currently. He is going to see his neurosurgeon tomorrow. He is using anywhere from 6-8 Percocet per day. I've asked him to continue to follow with his neurosurgeon for pain meds for right now. I will see him back in a month and then take over his narcotics at that time I have told him he cannot write him for more than 6 tabs of Percocet per day. Patient's postoperative course was complicated by wound infection. He was treated with antibiotics and has improved.   Name band;

## 2018-07-26 ENCOUNTER — OFFICE VISIT (OUTPATIENT)
Dept: MEDICAL GROUP | Facility: PHYSICIAN GROUP | Age: 58
End: 2018-07-26
Payer: COMMERCIAL

## 2018-07-26 VITALS
HEIGHT: 74 IN | RESPIRATION RATE: 18 BRPM | HEART RATE: 69 BPM | DIASTOLIC BLOOD PRESSURE: 76 MMHG | OXYGEN SATURATION: 96 % | TEMPERATURE: 98.3 F | WEIGHT: 312 LBS | SYSTOLIC BLOOD PRESSURE: 122 MMHG | BODY MASS INDEX: 40.04 KG/M2

## 2018-07-26 DIAGNOSIS — Z13.1 DIABETES MELLITUS SCREENING: ICD-10-CM

## 2018-07-26 DIAGNOSIS — Z12.5 ENCOUNTER FOR SCREENING FOR MALIGNANT NEOPLASM OF PROSTATE: ICD-10-CM

## 2018-07-26 DIAGNOSIS — E78.5 DYSLIPIDEMIA: ICD-10-CM

## 2018-07-26 DIAGNOSIS — E11.69 DIABETES MELLITUS TYPE 2 IN OBESE (HCC): ICD-10-CM

## 2018-07-26 DIAGNOSIS — E66.9 DIABETES MELLITUS TYPE 2 IN OBESE (HCC): ICD-10-CM

## 2018-07-26 DIAGNOSIS — Z12.11 COLON CANCER SCREENING: ICD-10-CM

## 2018-07-26 DIAGNOSIS — I10 ESSENTIAL HYPERTENSION: ICD-10-CM

## 2018-07-26 PROBLEM — R50.9 FEVER AND CHILLS: Status: RESOLVED | Noted: 2018-02-13 | Resolved: 2018-07-26

## 2018-07-26 LAB
HBA1C MFR BLD: 7.2 % (ref ?–5.8)
INT CON NEG: NEGATIVE
INT CON POS: POSITIVE

## 2018-07-26 PROCEDURE — 83036 HEMOGLOBIN GLYCOSYLATED A1C: CPT | Performed by: FAMILY MEDICINE

## 2018-07-26 PROCEDURE — 99214 OFFICE O/P EST MOD 30 MIN: CPT | Performed by: FAMILY MEDICINE

## 2018-07-26 NOTE — ASSESSMENT & PLAN NOTE
A1c stable at 7.2, metformin 1000 bid and glipizide 10 mg bid, asa 81mg, losartan.   LDL 47  microalbumin and gfr normal  Eye exam was done in Jan and no diabetic retinopathy. He has no ulcers on his feet.

## 2018-07-26 NOTE — PROGRESS NOTES
Subjective:   Carol Riojas is a 58 y.o. male here today for evaluation and management of:     Diabetes mellitus type 2 in obese  A1c stable at 7.2, metformin 1000 bid and glipizide 10 mg bid, asa 81mg, losartan.   LDL 47  microalbumin and gfr normal  Eye exam was done in Jan and no diabetic retinopathy. He has no ulcers on his feet.       HTN (hypertension)  BP well controlled on losartan 100 mg amlodipine 10 mg and metoprolol 25 mg    Dyslipidemia  Results for CAROL RIOJAS (MRN [id number]) as of 7/26/2018 13:31   Ref. Range 11/3/2017 10:10   Cholesterol,Tot Latest Ref Range: 100 - 199 mg/dL 134   Triglycerides Latest Ref Range: 0 - 149 mg/dL 261 (H)   HDL Latest Ref Range: >=40 mg/dL 35 (A)   LDL Latest Ref Range: <100 mg/dL 47   Only elevation in TG, LDL is 47  Due for recheck         Current medicines (including changes today)  Current Outpatient Prescriptions   Medication Sig Dispense Refill   • metformin (GLUCOPHAGE) 1000 MG tablet TAKE ONE TABLET BY MOUTH TWICE DAILY WITH MEALS 180 Tab 0   • gabapentin (NEURONTIN) 300 MG Cap TAKE 2 CAPSULES BY MOUTH THREE TIMES DAILY 540 Cap 0   • tamsulosin (FLOMAX) 0.4 MG capsule TAKE 2 CAPSULES BY MOUTH ONCE DAILY 180 Cap 0   • sertraline (ZOLOFT) 50 MG Tab TAKE 1 TABLET BY MOUTH ONCE DAILY 90 Tab 0   • zolpidem (AMBIEN) 10 MG Tab Take 1 Tab by mouth at bedtime as needed for Sleep for up to 90 days. 30 Tab 2   • amitriptyline (ELAVIL) 50 MG Tab Take 1 Tab by mouth every bedtime. 90 Tab 3   • metoprolol SR (TOPROL XL) 25 MG TABLET SR 24 HR Take 1 Tab by mouth every day. 90 Tab 3   • nateglinide (STARLIX) 120 MG Tab TAKE ONE TABLET BY MOUTH THREE TIMES DAILY BEFORE MEAL(S) 270 Tab 0   • amLODIPine (NORVASC) 10 MG Tab TAKE ONE TABLET BY MOUTH ONCE DAILY 90 Tab 1   • celecoxib (CELEBREX) 200 MG Cap TAKE ONE CAPSULE BY MOUTH TWICE DAILY 180 Cap 1   • losartan (COZAAR) 100 MG Tab TAKE ONE TABLET BY MOUTH ONCE DAILY 90 Tab 1   • finasteride (PROSCAR) 5 MG Tab TAKE  ONE TABLET BY MOUTH ONCE DAILY 90 Tab 3   • trazodone (DESYREL) 100 MG Tab TAKE 1-2 TABLETS (100-200MG) BY MOUTH AT NIGHT FOR SLEEP IF NEEDED 180 Tab 3   • glipiZIDE (GLUCOTROL) 10 MG Tab Take 1 Tab by mouth 2 times a day. 180 Tab 3   • tizanidine (ZANAFLEX) 4 MG Tab Take 1 Tab by mouth every 6 hours as needed (back pain). (Patient taking differently: Take 4 mg by mouth every day.) 270 Tab 3   • omeprazole (PRILOSEC) 20 MG delayed-release capsule TAKE ONE CAPSULE BY MOUTH ONCE DAILY 90 Cap 3   • Multiple Vitamins-Minerals (MULTIVITAMIN PO) Take 1 Tab by mouth every day.       • Fexofenadine HCl (ALLEGRA PO) Take  by mouth as needed.     • Cholecalciferol (VITAMIN D) 2000 UNIT CAPS Take  by mouth every day.     • GLUCOSAMINE-CHONDROITIN-MSM PO Take 2 Tabs by mouth 2 Times a Day.     • aspirin 81 MG tablet Take 81 mg by mouth every day.     • Misc. Devices Misc One touch ultra test strips. Patient has DM type 2 and tests twice per day 100 Each 3   • Misc. Devices Misc One touch ultra lancets; patient has diabetes mellitus type 2 and test once per day 100 Device 3   • Misc. Devices Misc CPAP supplies; fax to [facility] [phone]. 100 Device 3   • fluticasone (FLONASE) 50 MCG/ACT nasal spray Spray 2 Sprays in nose every day. Each Nostril 1 Bottle 3   • albuterol (VENTOLIN OR PROVENTIL) 108 (90 BASE) MCG/ACT AERS inhalation aerosol Inhale 2 Puffs by mouth every 6 hours as needed for Shortness of Breath. 8.5 g 3     No current facility-administered medications for this visit.      He  has a past medical history of Anesthesia; Anxiety; Arthritis; Body aches (11/19/2014); Bronchitis (2009); Chickenpox; Diabetes; Dysuria (7/30/2014); Enlarged liver; GERD (gastroesophageal reflux disease) (12/21/2011); Heart burn; Hypertension; Indigestion; Influenza; Insomnia (12/21/2011); Nephrolithiasis (7/30/2014); Obesity; BRAIN (obstructive sleep apnea); Pain (12-14-12); Psychiatric problem; Sleep apnea; and Snoring.    ROS  No chest pain,  "no shortness of breath, no abdominal pain       Objective:     Blood pressure 122/76, pulse 69, temperature 36.8 °C (98.3 °F), resp. rate 18, height 1.88 m (6' 2\"), weight (!) 141.5 kg (312 lb), SpO2 96 %. Body mass index is 40.06 kg/m².   Physical Exam:  Constitutional: Alert, no distress.  Skin: Warm, dry, good turgor, no rashes in visible areas.  Eye: Equal, round and reactive, conjunctiva clear, lids normal.  ENMT: Lips without lesions, good dentition, oropharynx clear.  Neck: Trachea midline, no masses, no thyromegaly. No cervical or supraclavicular lymphadenopathy  Respiratory: Unlabored respiratory effort, lungs clear to auscultation, no wheezes, no ronchi.  Cardiovascular: Normal S1, S2, no murmur, no edema.  Abdomen: Soft, non-tender, no masses, no hepatosplenomegaly.  Psych: Alert and oriented x3, normal affect and mood.        Assessment and Plan:   The following treatment plan was discussed    1. Diabetes mellitus screening  Stable, encouraged gradual weight loss, healthy diet, portion sizes.   - POCT Hemoglobin A1C  - COMP METABOLIC PANEL; Future  - HEMOGLOBIN A1C; Future  - MICROALBUMIN CREAT RATIO URINE (LAB COLLECT); Future    2. Essential hypertension  Well controlled.     3. Dyslipidemia  Controlled. Recheck   - LIPID PROFILE; Future    4. Encounter for screening for malignant neoplasm of prostate  - PROSTATE SPECIFIC AG SCREENING; Future    5. Colon cancer screening  - REFERRAL TO GI FOR COLONOSCOPY  - OCCULT BLOOD FECES IMMUNOASSAY (FIT); Future      Followup: No Follow-up on file.         "

## 2018-07-26 NOTE — ASSESSMENT & PLAN NOTE
Results for SARAVIA, CAROL TELLEZ (MRN [id number]) as of 7/26/2018 13:31   Ref. Range 11/3/2017 10:10   Cholesterol,Tot Latest Ref Range: 100 - 199 mg/dL 134   Triglycerides Latest Ref Range: 0 - 149 mg/dL 261 (H)   HDL Latest Ref Range: >=40 mg/dL 35 (A)   LDL Latest Ref Range: <100 mg/dL 47   Only elevation in TG, LDL is 47  Due for recheck

## 2018-08-06 ENCOUNTER — TELEPHONE (OUTPATIENT)
Dept: MEDICAL GROUP | Facility: PHYSICIAN GROUP | Age: 58
End: 2018-08-06

## 2018-08-06 DIAGNOSIS — F51.01 PRIMARY INSOMNIA: ICD-10-CM

## 2018-08-06 DIAGNOSIS — F33.1 MODERATE EPISODE OF RECURRENT MAJOR DEPRESSIVE DISORDER (HCC): ICD-10-CM

## 2018-08-06 DIAGNOSIS — M48.062 LUMBAR STENOSIS WITH NEUROGENIC CLAUDICATION: ICD-10-CM

## 2018-08-07 ENCOUNTER — TELEPHONE (OUTPATIENT)
Dept: MEDICAL GROUP | Facility: PHYSICIAN GROUP | Age: 58
End: 2018-08-07

## 2018-08-07 DIAGNOSIS — R39.9 LOWER URINARY TRACT SYMPTOMS (LUTS): ICD-10-CM

## 2018-08-07 RX ORDER — AMLODIPINE BESYLATE 10 MG/1
TABLET ORAL
Qty: 90 TAB | Refills: 0 | Status: SHIPPED | OUTPATIENT
Start: 2018-08-07 | End: 2018-11-07 | Stop reason: SDUPTHER

## 2018-08-07 RX ORDER — CELECOXIB 200 MG/1
CAPSULE ORAL
Qty: 180 CAP | Refills: 0 | Status: SHIPPED | OUTPATIENT
Start: 2018-08-07 | End: 2018-11-15 | Stop reason: SDUPTHER

## 2018-08-07 RX ORDER — NATEGLINIDE 120 MG/1
TABLET ORAL
Qty: 270 TAB | Refills: 0 | Status: SHIPPED | OUTPATIENT
Start: 2018-08-07 | End: 2018-11-07 | Stop reason: SDUPTHER

## 2018-08-07 NOTE — TELEPHONE ENCOUNTER
Was the patient seen in the last year in this department? Yes    Does patient have an active prescription for medications requested? No     Received Request Via: Pharmacy      Pt met protocol?: Yes pt last ov 7/18   BP Readings from Last 1 Encounters:   07/26/18 122/76

## 2018-08-07 NOTE — TELEPHONE ENCOUNTER
1. Caller Name: Jamey Riojas                                         Call Back Number: [phone] (home)       Patient approves a detailed voicemail message: N\A    2. SPECIFIC Action To Be Taken: Orders pending, please sign.    3. Diagnosis/Clinical Reason for Request: patient is request to see a specialist to get a prostate exam     4. Specialty & Provider Name/Lab/Imaging Location: unknown    5. Is appointment scheduled for requested order/referral: no    Patient was informed they will receive a return phone call from the office ONLY if there are any questions before processing their request. Advised to call back if they haven't received a call from the referral department in 5 days.

## 2018-08-09 RX ORDER — ZOLPIDEM TARTRATE 10 MG/1
10 TABLET ORAL NIGHTLY PRN
Qty: 30 TAB | Refills: 2 | Status: SHIPPED
Start: 2018-08-09 | End: 2018-11-07

## 2018-08-13 RX ORDER — LOSARTAN POTASSIUM 100 MG/1
TABLET ORAL
Qty: 90 TAB | Refills: 0 | Status: SHIPPED | OUTPATIENT
Start: 2018-08-13 | End: 2018-11-28 | Stop reason: SDUPTHER

## 2018-08-13 NOTE — TELEPHONE ENCOUNTER
Was the patient seen in the last year in this department? Yes    Does patient have an active prescription for medications requested? No     Received Request Via: Pharmacy      Pt met protocol?: Yes    OV 7/18

## 2018-08-14 ENCOUNTER — TELEPHONE (OUTPATIENT)
Dept: MEDICAL GROUP | Facility: PHYSICIAN GROUP | Age: 58
End: 2018-08-14

## 2018-08-14 NOTE — TELEPHONE ENCOUNTER
MEDICATION PRIOR AUTHORIZATION NEEDED:    1. Name of Medication: celecoxib    2. Requested By (Name of Pharmacy): [facility]     3. Is insurance on file current? yes    4. What is the name & phone number of the 3rd party payor? [employer]  Key: [id number]

## 2018-10-02 DIAGNOSIS — N40.1 BENIGN NODULAR PROSTATIC HYPERPLASIA WITH LOWER URINARY TRACT SYMPTOMS: ICD-10-CM

## 2018-10-04 RX ORDER — TAMSULOSIN HYDROCHLORIDE 0.4 MG/1
CAPSULE ORAL
Qty: 180 CAP | Refills: 0 | Status: SHIPPED | OUTPATIENT
Start: 2018-10-04 | End: 2019-01-01 | Stop reason: SDUPTHER

## 2018-10-04 RX ORDER — GABAPENTIN 300 MG/1
CAPSULE ORAL
Qty: 540 CAP | Refills: 0 | Status: SHIPPED | OUTPATIENT
Start: 2018-10-04 | End: 2019-04-13 | Stop reason: SDUPTHER

## 2018-10-04 NOTE — TELEPHONE ENCOUNTER
Was the patient seen in the last year in this department? Yes    Does patient have an active prescription for medications requested? No     Received Request Via: Pharmacy    Pt met protocol?: Yes     Last OV 07/2018    Lab Results   Component Value Date/Time    HBA1C 7.2 07/26/2018 01:25 PM        Lab Results  Component Value Date/Time   CHOLSTRLTOT 134 11/03/2017 1010   TRIGLYCERIDE 261 (H) 11/03/2017 1010   HDL 35 (A) 11/03/2017 1010   LDL 47 11/03/2017 1010

## 2018-10-10 DIAGNOSIS — N40.1 BENIGN NODULAR PROSTATIC HYPERPLASIA WITH LOWER URINARY TRACT SYMPTOMS: ICD-10-CM

## 2018-10-10 RX ORDER — GABAPENTIN 300 MG/1
CAPSULE ORAL
Refills: 0 | OUTPATIENT
Start: 2018-10-10

## 2018-10-10 RX ORDER — TAMSULOSIN HYDROCHLORIDE 0.4 MG/1
CAPSULE ORAL
Refills: 0 | OUTPATIENT
Start: 2018-10-10

## 2018-10-12 ENCOUNTER — HOSPITAL ENCOUNTER (OUTPATIENT)
Dept: RADIOLOGY | Facility: MEDICAL CENTER | Age: 58
End: 2018-10-12
Attending: ORTHOPAEDIC SURGERY
Payer: COMMERCIAL

## 2018-10-12 DIAGNOSIS — M51.26 HERNIATED LUMBAR INTERVERTEBRAL DISC: ICD-10-CM

## 2018-10-12 DIAGNOSIS — Z98.1 ARTHRODESIS STATUS: ICD-10-CM

## 2018-10-12 PROCEDURE — 72070 X-RAY EXAM THORAC SPINE 2VWS: CPT

## 2018-10-12 PROCEDURE — 72100 X-RAY EXAM L-S SPINE 2/3 VWS: CPT

## 2018-10-18 ENCOUNTER — TELEPHONE (OUTPATIENT)
Dept: MEDICAL GROUP | Facility: PHYSICIAN GROUP | Age: 58
End: 2018-10-18

## 2018-10-18 NOTE — TELEPHONE ENCOUNTER
MEDICATION PRIOR AUTHORIZATION:    1. Name of Medication: celecoxib    2. Requested By (Name of Pharmacy): [facility]     3. Is insurance on file current? yes    4. What is the name & phone number of the 3rd party payor?   [employer]  Key: [id number]

## 2018-10-30 ENCOUNTER — HOSPITAL ENCOUNTER (OUTPATIENT)
Dept: LAB | Facility: MEDICAL CENTER | Age: 58
End: 2018-10-30
Attending: UROLOGY
Payer: COMMERCIAL

## 2018-10-30 LAB — PSA SERPL-MCNC: 0.28 NG/ML (ref 0–4)

## 2018-10-30 PROCEDURE — 36415 COLL VENOUS BLD VENIPUNCTURE: CPT

## 2018-10-30 PROCEDURE — 84153 ASSAY OF PSA TOTAL: CPT

## 2018-11-07 DIAGNOSIS — F51.01 PRIMARY INSOMNIA: ICD-10-CM

## 2018-11-07 DIAGNOSIS — Z12.5 SCREENING FOR PROSTATE CANCER: ICD-10-CM

## 2018-11-07 DIAGNOSIS — N40.1 BENIGN NODULAR PROSTATIC HYPERPLASIA WITH LOWER URINARY TRACT SYMPTOMS: ICD-10-CM

## 2018-11-07 NOTE — TELEPHONE ENCOUNTER
Was the patient seen in the last year in this department? Yes    Does patient have an active prescription for medications requested? No     Received Request Via: Pharmacy    Pt met protocol?: Yes     Last OV 07/2018  BP Readings from Last 1 Encounters:   07/26/18 122/76     Lab Results   Component Value Date/Time    HBA1C 7.2 07/26/2018 01:25 PM        Lab Results  Component Value Date/Time   CHOLSTRLTOT 134 11/03/2017 1010   TRIGLYCERIDE 261 (H) 11/03/2017 1010   HDL 35 (A) 11/03/2017 1010   LDL 47 11/03/2017 1010

## 2018-11-08 ENCOUNTER — HOSPITAL ENCOUNTER (OUTPATIENT)
Dept: LAB | Facility: MEDICAL CENTER | Age: 58
End: 2018-11-08
Attending: FAMILY MEDICINE
Payer: COMMERCIAL

## 2018-11-08 DIAGNOSIS — E78.5 DYSLIPIDEMIA: ICD-10-CM

## 2018-11-08 DIAGNOSIS — Z13.1 DIABETES MELLITUS SCREENING: ICD-10-CM

## 2018-11-08 DIAGNOSIS — Z12.5 ENCOUNTER FOR SCREENING FOR MALIGNANT NEOPLASM OF PROSTATE: ICD-10-CM

## 2018-11-08 LAB
ALBUMIN SERPL BCP-MCNC: 4.1 G/DL (ref 3.2–4.9)
ALBUMIN/GLOB SERPL: 1.5 G/DL
ALP SERPL-CCNC: 112 U/L (ref 30–99)
ALT SERPL-CCNC: 28 U/L (ref 2–50)
ANION GAP SERPL CALC-SCNC: 8 MMOL/L (ref 0–11.9)
AST SERPL-CCNC: 21 U/L (ref 12–45)
BILIRUB SERPL-MCNC: 0.7 MG/DL (ref 0.1–1.5)
BUN SERPL-MCNC: 19 MG/DL (ref 8–22)
CALCIUM SERPL-MCNC: 9.3 MG/DL (ref 8.5–10.5)
CHLORIDE SERPL-SCNC: 104 MMOL/L (ref 96–112)
CHOLEST SERPL-MCNC: 157 MG/DL (ref 100–199)
CO2 SERPL-SCNC: 25 MMOL/L (ref 20–33)
CREAT SERPL-MCNC: 0.93 MG/DL (ref 0.5–1.4)
CREAT UR-MCNC: 180.3 MG/DL
EST. AVERAGE GLUCOSE BLD GHB EST-MCNC: 180 MG/DL
FASTING STATUS PATIENT QL REPORTED: NORMAL
GLOBULIN SER CALC-MCNC: 2.8 G/DL (ref 1.9–3.5)
GLUCOSE SERPL-MCNC: 201 MG/DL (ref 65–99)
HBA1C MFR BLD: 7.9 % (ref 0–5.6)
HDLC SERPL-MCNC: 40 MG/DL
LDLC SERPL CALC-MCNC: 61 MG/DL
MICROALBUMIN UR-MCNC: <0.7 MG/DL
MICROALBUMIN/CREAT UR: NORMAL MG/G (ref 0–30)
POTASSIUM SERPL-SCNC: 4.3 MMOL/L (ref 3.6–5.5)
PROT SERPL-MCNC: 6.9 G/DL (ref 6–8.2)
PSA SERPL-MCNC: 0.31 NG/ML (ref 0–4)
SODIUM SERPL-SCNC: 137 MMOL/L (ref 135–145)
TRIGL SERPL-MCNC: 282 MG/DL (ref 0–149)

## 2018-11-08 PROCEDURE — 36415 COLL VENOUS BLD VENIPUNCTURE: CPT

## 2018-11-08 PROCEDURE — 80061 LIPID PANEL: CPT

## 2018-11-08 PROCEDURE — 84153 ASSAY OF PSA TOTAL: CPT

## 2018-11-08 PROCEDURE — 83036 HEMOGLOBIN GLYCOSYLATED A1C: CPT

## 2018-11-08 PROCEDURE — 82043 UR ALBUMIN QUANTITATIVE: CPT

## 2018-11-08 PROCEDURE — 80053 COMPREHEN METABOLIC PANEL: CPT

## 2018-11-08 PROCEDURE — 82570 ASSAY OF URINE CREATININE: CPT

## 2018-11-08 RX ORDER — GLIPIZIDE 10 MG/1
TABLET ORAL
Qty: 180 TAB | Refills: 0 | Status: SHIPPED | OUTPATIENT
Start: 2018-11-08 | End: 2019-02-06 | Stop reason: SDUPTHER

## 2018-11-08 RX ORDER — NATEGLINIDE 120 MG/1
TABLET ORAL
Qty: 270 TAB | Refills: 0 | Status: SHIPPED | OUTPATIENT
Start: 2018-11-08 | End: 2019-02-13 | Stop reason: SDUPTHER

## 2018-11-08 RX ORDER — AMLODIPINE BESYLATE 10 MG/1
TABLET ORAL
Qty: 90 TAB | Refills: 0 | Status: SHIPPED | OUTPATIENT
Start: 2018-11-08 | End: 2019-02-06 | Stop reason: SDUPTHER

## 2018-11-08 RX ORDER — TRAZODONE HYDROCHLORIDE 100 MG/1
TABLET ORAL
Qty: 180 TAB | Refills: 0 | Status: SHIPPED | OUTPATIENT
Start: 2018-11-08 | End: 2019-02-13 | Stop reason: SDUPTHER

## 2018-11-08 RX ORDER — FINASTERIDE 5 MG/1
TABLET, FILM COATED ORAL
Qty: 90 TAB | Refills: 0 | Status: SHIPPED | OUTPATIENT
Start: 2018-11-08 | End: 2019-02-06 | Stop reason: SDUPTHER

## 2018-11-10 RX ORDER — ZOLPIDEM TARTRATE 10 MG/1
TABLET ORAL
Refills: 2 | OUTPATIENT
Start: 2018-11-10

## 2018-11-15 ENCOUNTER — OFFICE VISIT (OUTPATIENT)
Dept: MEDICAL GROUP | Facility: PHYSICIAN GROUP | Age: 58
End: 2018-11-15
Payer: COMMERCIAL

## 2018-11-15 VITALS
HEIGHT: 72 IN | OXYGEN SATURATION: 96 % | WEIGHT: 315 LBS | HEART RATE: 77 BPM | BODY MASS INDEX: 42.66 KG/M2 | SYSTOLIC BLOOD PRESSURE: 120 MMHG | RESPIRATION RATE: 16 BRPM | DIASTOLIC BLOOD PRESSURE: 70 MMHG | TEMPERATURE: 97.6 F

## 2018-11-15 DIAGNOSIS — E66.9 DIABETES MELLITUS TYPE 2 IN OBESE (HCC): ICD-10-CM

## 2018-11-15 DIAGNOSIS — F51.01 PRIMARY INSOMNIA: ICD-10-CM

## 2018-11-15 DIAGNOSIS — M48.062 LUMBAR STENOSIS WITH NEUROGENIC CLAUDICATION: ICD-10-CM

## 2018-11-15 DIAGNOSIS — D64.9 ANEMIA OF UNKNOWN ETIOLOGY: ICD-10-CM

## 2018-11-15 DIAGNOSIS — E11.69 DIABETES MELLITUS TYPE 2 IN OBESE (HCC): ICD-10-CM

## 2018-11-15 PROCEDURE — 99214 OFFICE O/P EST MOD 30 MIN: CPT | Performed by: FAMILY MEDICINE

## 2018-11-15 RX ORDER — CELECOXIB 200 MG/1
CAPSULE ORAL
Qty: 180 CAP | Refills: 3 | Status: SHIPPED | OUTPATIENT
Start: 2018-11-15 | End: 2019-12-30

## 2018-11-15 RX ORDER — ZOLPIDEM TARTRATE 10 MG/1
10 TABLET ORAL NIGHTLY PRN
Qty: 30 TAB | Refills: 5 | Status: SHIPPED | OUTPATIENT
Start: 2018-11-15 | End: 2018-12-15

## 2018-11-15 ASSESSMENT — PATIENT HEALTH QUESTIONNAIRE - PHQ9: CLINICAL INTERPRETATION OF PHQ2 SCORE: 0

## 2018-11-15 NOTE — ASSESSMENT & PLAN NOTE
A1c increased to 7.9, Is on metformin 1000 bid and glimepiride 10 mg bid, no hypoglycemia. Also on asa 81 mg and losartan 100mg  GFR >60 normal urine microalbumin  LDL at goal 61  Has morbid obesity, chronic pain restricts movement and activity.   Next eye exam due in Jan.   Reports a lot of stress due to house for sale and stress at work also.   Advised to add jardiance 10 mg a day recheck renal function, he does not want to go up on medication, wants to recheck A1c.   Monofilament testing abnormal with decreased sensation to tops of feet b/l. Left great toe with toenail dystrophy and mild weeping. No pus or redness or pain.

## 2018-11-15 NOTE — ASSESSMENT & PLAN NOTE
Chronic condition, controlled on ambien 10 mg and trazodone. Encouraged him to go to lower dose of ambien 5 mg which he will try in 6 months as a lot of stress with selling his house now.

## 2018-11-16 NOTE — PROGRESS NOTES
Subjective:   Jamey Riojas is a 58 y.o. male here today for evaluation and management of:     Diabetes mellitus type 2 in obese  A1c increased to 7.9, Is on metformin 1000 bid and glimepiride 10 mg bid, no hypoglycemia. Also on asa 81 mg and losartan 100mg  GFR >60 normal urine microalbumin  LDL at goal 61  Has morbid obesity, chronic pain restricts movement and activity.   Next eye exam due in Jan.   Reports a lot of stress due to house for sale and stress at work also.   Advised to add jardiance 10 mg a day recheck renal function, he does not want to go up on medication, wants to recheck A1c.   Monofilament testing abnormal with decreased sensation to tops of feet b/l. Left great toe with toenail dystrophy and mild weeping. No pus or redness or pain.         Anemia of unknown etiology  Resolved. Last FIT 2017 was normal  encouraged to get repeat FIt done this year.     Insomnia  Chronic condition, controlled on ambien 10 mg and trazodone. Encouraged him to go to lower dose of ambien 5 mg which he will try in 6 months as a lot of stress with selling his house now.          Current medicines (including changes today)  Current Outpatient Prescriptions   Medication Sig Dispense Refill   • zolpidem (AMBIEN) 10 MG Tab Take 1 Tab by mouth at bedtime as needed for Sleep for up to 30 days. 30 Tab 5   • celecoxib (CELEBREX) 200 MG Cap TAKE 1 CAPSULE BY MOUTH TWICE DAILY 180 Cap 3   • traZODone (DESYREL) 100 MG Tab TAKE ONE TO TWO TABLETS BY MOUTH AT BEDTIME FOR SLEEP IF  NEEDED 180 Tab 0   • nateglinide (STARLIX) 120 MG Tab TAKE 1 TABLET BY MOUTH THREE TIMES DAILY BEFORE MEAL(S) 270 Tab 0   • glipiZIDE (GLUCOTROL) 10 MG Tab TAKE ONE TABLET BY MOUTH TWICE DAILY 180 Tab 0   • finasteride (PROSCAR) 5 MG Tab TAKE ONE TABLET BY MOUTH ONCE DAILY 90 Tab 0   • amLODIPine (NORVASC) 10 MG Tab TAKE 1 TABLET BY MOUTH ONCE DAILY 90 Tab 0   • metformin (GLUCOPHAGE) 1000 MG tablet TAKE 1 TABLET BY MOUTH TWICE DAILY WITH MEALS 180  Tab 0   • tamsulosin (FLOMAX) 0.4 MG capsule TAKE 2 CAPSULES BY MOUTH ONCE DAILY 180 Cap 0   • gabapentin (NEURONTIN) 300 MG Cap TAKE 2 CAPSULES BY MOUTH THREE TIMES DAILY 540 Cap 0   • losartan (COZAAR) 100 MG Tab TAKE ONE TABLET BY MOUTH ONCE DAILY 90 Tab 0   • sertraline (ZOLOFT) 50 MG Tab TAKE 1 TABLET BY MOUTH ONCE DAILY 90 Tab 0   • amitriptyline (ELAVIL) 50 MG Tab Take 1 Tab by mouth every bedtime. 90 Tab 3   • metoprolol SR (TOPROL XL) 25 MG TABLET SR 24 HR Take 1 Tab by mouth every day. 90 Tab 3   • Misc. Devices Misc One touch ultra test strips. Patient has DM type 2 and tests twice per day 100 Each 3   • Misc. Devices Misc One touch ultra lancets; patient has diabetes mellitus type 2 and test once per day 100 Device 3   • tizanidine (ZANAFLEX) 4 MG Tab Take 1 Tab by mouth every 6 hours as needed (back pain). (Patient taking differently: Take 4 mg by mouth every day.) 270 Tab 3   • omeprazole (PRILOSEC) 20 MG delayed-release capsule TAKE ONE CAPSULE BY MOUTH ONCE DAILY 90 Cap 3   • Misc. Devices Misc CPAP supplies; fax to [facility] [phone]. 100 Device 3   • fluticasone (FLONASE) 50 MCG/ACT nasal spray Spray 2 Sprays in nose every day. Each Nostril 1 Bottle 3   • albuterol (VENTOLIN OR PROVENTIL) 108 (90 BASE) MCG/ACT AERS inhalation aerosol Inhale 2 Puffs by mouth every 6 hours as needed for Shortness of Breath. 8.5 g 3   • Multiple Vitamins-Minerals (MULTIVITAMIN PO) Take 1 Tab by mouth every day.       • Fexofenadine HCl (ALLEGRA PO) Take  by mouth as needed.     • Cholecalciferol (VITAMIN D) 2000 UNIT CAPS Take  by mouth every day.     • GLUCOSAMINE-CHONDROITIN-MSM PO Take 2 Tabs by mouth 2 Times a Day.     • aspirin 81 MG tablet Take 81 mg by mouth every day.       No current facility-administered medications for this visit.      He  has a past medical history of Anesthesia; Anxiety; Arthritis; Body aches (11/19/2014); Bronchitis (2009); Chickenpox; Diabetes; Dysuria (7/30/2014); Enlarged liver; GERD  (gastroesophageal reflux disease) (12/21/2011); Heart burn; Hypertension; Indigestion; Influenza; Insomnia (12/21/2011); Nephrolithiasis (7/30/2014); Obesity; BRAIN (obstructive sleep apnea); Pain (12-14-12); Psychiatric problem; Sleep apnea; and Snoring.    ROS  No chest pain, no shortness of breath, no abdominal pain       Objective:     Blood pressure 120/70, pulse 77, temperature 36.4 °C (97.6 °F), temperature source Temporal, resp. rate 16, height 1.829 m (6'), weight (!) 144.7 kg (319 lb), SpO2 96 %. Body mass index is 43.26 kg/m².   Physical Exam:  Constitutional: Alert, no distress.  Skin: Warm, dry, good turgor, no rashes in visible areas.  Eye: Equal, round and reactive, conjunctiva clear, lids normal.  ENMT: Lips without lesions, good dentition, oropharynx clear.  Neck: Trachea midline, no masses, no thyromegaly. No cervical or supraclavicular lymphadenopathy  Respiratory: Unlabored respiratory effort, lungs clear to auscultation, no wheezes, no ronchi.  Cardiovascular: Normal S1, S2, no murmur, no edema.  Abdomen: Soft, non-tender, no masses, no hepatosplenomegaly.  Psych: Alert and oriented x3, normal affect and mood.        Assessment and Plan:   The following treatment plan was discussed    1. Diabetes mellitus type 2 in obese (HCC)  Worsening,   Encouraged weight loss, diet changes,   - Diabetic Monofilament Lower Extremity Exam  - HEMOGLOBIN A1C; Future    2. Anemia of unknown etiology  Resolved, had polyps on a prior colonoscopy. Encouraged to get repeat FIT done.     3. Primary insomnia  Encouraged to decrease to ambien 5 mg. Unable to do so at this visit.   Refills provided.   - zolpidem (AMBIEN) 10 MG Tab; Take 1 Tab by mouth at bedtime as needed for Sleep for up to 30 days.  Dispense: 30 Tab; Refill: 5    4. Lumbar stenosis with neurogenic claudication  No acute changes.   Has chronic pain.   - celecoxib (CELEBREX) 200 MG Cap; TAKE 1 CAPSULE BY MOUTH TWICE DAILY  Dispense: 180 Cap; Refill:  3      Followup: Return in about 6 months (around 5/15/2019) for DM, HTN, anemia.

## 2018-11-17 NOTE — TELEPHONE ENCOUNTER
Has upcoming appt w/ PCP. Will send 3 months of fills to pharmacy.    
Was the patient seen in the last year in this department? Yes     Does patient have an active prescription for medications requested? No     Received Request Via: Pharmacy      Pt met protocol?: Yes, ov 4/4/18 pcp ov pcp 4/12/18     
DISPLAY PLAN FREE TEXT

## 2018-11-28 DIAGNOSIS — F33.1 MODERATE EPISODE OF RECURRENT MAJOR DEPRESSIVE DISORDER (HCC): ICD-10-CM

## 2018-11-30 RX ORDER — LOSARTAN POTASSIUM 100 MG/1
TABLET ORAL
Qty: 90 TAB | Refills: 1 | Status: SHIPPED | OUTPATIENT
Start: 2018-11-30 | End: 2019-06-06 | Stop reason: SDUPTHER

## 2018-11-30 NOTE — TELEPHONE ENCOUNTER
Refill X 6 months, sent to pharmacy.Pt. Seen in the last 6 months per protocol.   Lab Results   Component Value Date/Time    SODIUM 137 11/08/2018 09:03 AM    POTASSIUM 4.3 11/08/2018 09:03 AM    CHLORIDE 104 11/08/2018 09:03 AM    CO2 25 11/08/2018 09:03 AM    GLUCOSE 201 (H) 11/08/2018 09:03 AM    BUN 19 11/08/2018 09:03 AM    CREATININE 0.93 11/08/2018 09:03 AM    CREATININE 0.8 09/10/2007 10:15 AM

## 2019-01-01 DIAGNOSIS — N40.1 BENIGN NODULAR PROSTATIC HYPERPLASIA WITH LOWER URINARY TRACT SYMPTOMS: ICD-10-CM

## 2019-01-02 RX ORDER — ZOLPIDEM TARTRATE 10 MG/1
TABLET ORAL
Refills: 0 | OUTPATIENT
Start: 2019-01-02

## 2019-01-03 RX ORDER — TAMSULOSIN HYDROCHLORIDE 0.4 MG/1
CAPSULE ORAL
Qty: 180 CAP | Refills: 1 | Status: SHIPPED | OUTPATIENT
Start: 2019-01-03 | End: 2019-08-08 | Stop reason: SDUPTHER

## 2019-01-03 NOTE — TELEPHONE ENCOUNTER
Was the patient seen in the last year in this department? Yes    Does patient have an active prescription for medications requested? No     Received Request Via: Pharmacy      Pt met protocol?: Yes, OV 11/18   Lab Results   Component Value Date/Time    HBA1C 7.9 (H) 11/08/2018 09:03 AM

## 2019-01-03 NOTE — TELEPHONE ENCOUNTER
Patient has recently been seen by PCP within the last 6 months per protocol (11/18). Will refill medications for 6 months.  Lab Results   Component Value Date/Time    HBA1C 7.9 (H) 11/08/2018 09:03 AM      Lab Results   Component Value Date/Time    MALBCRT see below 11/08/2018 09:03 AM    MICROALBUR <0.7 11/08/2018 09:03 AM      Lab Results   Component Value Date/Time    ALKPHOSPHAT 112 (H) 11/08/2018 09:03 AM    ASTSGOT 21 11/08/2018 09:03 AM    ALTSGPT 28 11/08/2018 09:03 AM    TBILIRUBIN 0.7 11/08/2018 09:03 AM

## 2019-02-06 DIAGNOSIS — Z12.5 SCREENING FOR PROSTATE CANCER: ICD-10-CM

## 2019-02-06 DIAGNOSIS — N40.1 BENIGN NODULAR PROSTATIC HYPERPLASIA WITH LOWER URINARY TRACT SYMPTOMS: ICD-10-CM

## 2019-02-06 RX ORDER — AMLODIPINE BESYLATE 10 MG/1
TABLET ORAL
Qty: 90 TAB | Refills: 0 | Status: SHIPPED | OUTPATIENT
Start: 2019-02-06 | End: 2019-05-14 | Stop reason: SDUPTHER

## 2019-02-06 RX ORDER — FINASTERIDE 5 MG/1
TABLET, FILM COATED ORAL
Qty: 90 TAB | Refills: 0 | Status: SHIPPED | OUTPATIENT
Start: 2019-02-06 | End: 2019-05-14 | Stop reason: SDUPTHER

## 2019-02-06 RX ORDER — GLIPIZIDE 10 MG/1
TABLET ORAL
Qty: 180 TAB | Refills: 0 | Status: SHIPPED | OUTPATIENT
Start: 2019-02-06 | End: 2019-05-14 | Stop reason: SDUPTHER

## 2019-02-06 NOTE — TELEPHONE ENCOUNTER
Was the patient seen in the last year in this department? Yes    Does patient have an active prescription for medications requested? No     Received Request Via: Pharmacy      Pt met protocol?: Yes   Pt last ov 11/18   BP Readings from Last 1 Encounters:   11/15/18 120/70     Prostatic Specific Antigen Tot   Date Value Ref Range Status   11/08/2018 0.31 0.00 - 4.00 ng/mL Final     Comment:     The Access My[employer]QU[employer]britech PSA assay is a paramagnetic particle,  chemiluminescent immunoassay for the quantitative determination  of total prostate specific antigen (PSA) levels using the  [employer] Immunoassay System. Values obtained with different  methods cannot be used interchangeably for patient monitoring.

## 2019-02-13 DIAGNOSIS — F51.01 PRIMARY INSOMNIA: ICD-10-CM

## 2019-02-14 RX ORDER — NATEGLINIDE 120 MG/1
TABLET ORAL
Qty: 270 TAB | Refills: 0 | Status: SHIPPED | OUTPATIENT
Start: 2019-02-14 | End: 2019-06-23 | Stop reason: SDUPTHER

## 2019-02-14 RX ORDER — TRAZODONE HYDROCHLORIDE 100 MG/1
TABLET ORAL
Qty: 180 TAB | Refills: 0 | Status: SHIPPED | OUTPATIENT
Start: 2019-02-14 | End: 2019-05-14 | Stop reason: SDUPTHER

## 2019-03-27 RX ORDER — AMITRIPTYLINE HYDROCHLORIDE 50 MG/1
TABLET, FILM COATED ORAL
Qty: 90 TAB | Refills: 0 | Status: SHIPPED | OUTPATIENT
Start: 2019-03-27 | End: 2019-06-23 | Stop reason: SDUPTHER

## 2019-03-27 RX ORDER — METOPROLOL SUCCINATE 25 MG/1
TABLET, EXTENDED RELEASE ORAL
Qty: 90 TAB | Refills: 0 | Status: SHIPPED | OUTPATIENT
Start: 2019-03-27 | End: 2019-06-23 | Stop reason: SDUPTHER

## 2019-04-08 ENCOUNTER — OFFICE VISIT (OUTPATIENT)
Dept: URGENT CARE | Facility: PHYSICIAN GROUP | Age: 59
End: 2019-04-08
Payer: COMMERCIAL

## 2019-04-08 VITALS
RESPIRATION RATE: 14 BRPM | SYSTOLIC BLOOD PRESSURE: 148 MMHG | BODY MASS INDEX: 42.99 KG/M2 | WEIGHT: 315 LBS | OXYGEN SATURATION: 94 % | DIASTOLIC BLOOD PRESSURE: 80 MMHG | TEMPERATURE: 98.4 F | HEART RATE: 88 BPM

## 2019-04-08 DIAGNOSIS — J22 ACUTE LOWER RESPIRATORY INFECTION: ICD-10-CM

## 2019-04-08 DIAGNOSIS — J20.9 ACUTE BRONCHITIS, UNSPECIFIED ORGANISM: ICD-10-CM

## 2019-04-08 DIAGNOSIS — R05.9 COUGH: ICD-10-CM

## 2019-04-08 PROCEDURE — 99214 OFFICE O/P EST MOD 30 MIN: CPT | Performed by: FAMILY MEDICINE

## 2019-04-08 RX ORDER — SULFAMETHOXAZOLE AND TRIMETHOPRIM 800; 160 MG/1; MG/1
TABLET ORAL
Qty: 20 TAB | Refills: 0 | Status: SHIPPED | OUTPATIENT
Start: 2019-04-08 | End: 2019-05-09

## 2019-04-08 NOTE — PROGRESS NOTES
Chief Complaint:    Chief Complaint   Patient presents with   • URI       History of Present Illness:    This is a new problem. Symptoms for almost 3 weeks. Initially started with sore throat. Now has cough productive of purulent mucus that is worst symptom and interferes with sleep. Overall getting worse. Using OTC meds for symptoms. Z-dara does not typically help. Bactrim and Tussionex have worked/tolerated for similar previous symptoms.      Review of Systems:    Constitutional: Negative for fever, chills, and diaphoresis.   Eyes: Negative for change in vision, photophobia, pain, redness, and discharge.  ENT: See HPI.   Respiratory: See HPI.  Cardiovascular: Negative for chest pain, palpitations, orthopnea, claudication, leg swelling, and PND.   Gastrointestinal: Negative for abdominal pain, nausea, vomiting, diarrhea, constipation, blood in stool, and melena.   Genitourinary: Negative for dysuria, urinary urgency, urinary frequency, hematuria, and flank pain.   Musculoskeletal: No new symptoms.   Skin: Negative for rash and itching.   Neurological: No new symptoms.   Endo: Diabetic, on medication.  Heme: Does not bruise/bleed easily.   Psychiatric/Behavioral: No new symptoms.       Past Medical History:    Past Medical History:   Diagnosis Date   • Anesthesia     mother has hard time waking up   • Anxiety    • Arthritis     knees and shoulder joints and hands   • Body aches 11/19/2014   • Bronchitis 2009   • Chickenpox    • Diabetes     oral medication and diet   • Dysuria 7/30/2014   • Enlarged liver    • GERD (gastroesophageal reflux disease) 12/21/2011   • Heart burn    • Hypertension    • Indigestion    • Influenza    • Insomnia 12/21/2011   • Nephrolithiasis 7/30/2014    This is a recurring problem. Patient states that he was evaluated for a kidney stone 2012. CT scan showed a stone that was not obstructing at that time. He has been having increasing right-sided back pain for approximately the last month. He  has not had any fever or chills.   • Obesity    • BRAIN (obstructive sleep apnea)    • Pain 12-14-12    lower back,neck, 5/10   • Psychiatric problem     depression   • Sleep apnea     CPAP, O2 @ HS 2.5 liters   • Snoring      Past Surgical History:    Past Surgical History:   Procedure Laterality Date   • LUMBAR FUSION POSTERIOR  12/30/2012    Performed by Benjamin Kumar M.D. at SUR[facility]ERY [facility]   • CERVICAL DISK AND FUSION ANTERIOR  11/30/2012    Performed by Benjamin Kumar M.D. at SUR[facility]ERY [facility]   • HARDWARE REMOVAL ORTHO  11/30/2012    Performed by Benjamin Kumar M.D. at SUR[facility]ERY [facility]   • KNEE ARTHROSCOPY  9/18/2012    Performed by Servando Francis M.D. at SUR[facility]ERY [facility]   • MEDIAL MENISCECTOMY  9/18/2012    Performed by Servando Francis M.D. at SUR[facility]ERY [facility]   • DEBRIDEMENT  9/18/2012    Performed by Servando Francis M.D. at SUR[facility]ERY [facility]   • LUMBAR FUSION POSTERIOR  6/18/2010    Performed by BENJAMIN KUMAR at SUR[facility]ERY [facility]   • LUMBAR DECOMPRESSION  6/18/2010    Performed by BENJAMIN KUMAR at SUR[facility]ERY [facility]   • KNEE ARTHROSCOPY  10/20/2009    Performed by SERVANDO FRANCIS at SUR[facility]ERY [facility]   • MEDIAL MENISCECTOMY  10/20/2009    Performed by SERVANDO FRANCIS at SUR[facility]ERY [facility]   • CERVICAL FUSION POSTERIOR  9/2007    c -3-4-5    • CHOLECYSTECTOMY  1984    open   • FOOT SURGERY      left  Farrar's neuroma and heel spur     Social History:    Social History     Social History   • Marital status:      Spouse name: N/A   • Number of children: N/A   • Years of education: N/A     Occupational History   • Not on file.     Social History Main Topics   • Smoking status: Never Smoker   • Smokeless tobacco: Never Used   • Alcohol use No   • Drug use: No   • Sexual activity: Yes     Partners: Female     Other Topics Concern   • Not on file     Social History Narrative   • No narrative on file     Family  History:    Family History   Problem Relation Age of Onset   • Other Mother         osteopenia   • Hypertension Mother    • Psychiatry Mother    • Other Father         brain tumor   • Heart Attack Maternal Grandfather    • Sleep Apnea Neg Hx      Medications:    Current Outpatient Prescriptions on File Prior to Visit   Medication Sig Dispense Refill   • metoprolol SR (TOPROL XL) 25 MG TABLET SR 24 HR TAKE 1 TABLET BY MOUTH ONCE DAILY 90 Tab 0   • amitriptyline (ELAVIL) 50 MG Tab TAKE 1 TABLET BY MOUTH AT BEDTIME 90 Tab 0   • traZODone (DESYREL) 100 MG Tab TAKE 1 TO 2 TABLETS BY MOUTH ONCE DAILY AT BEDTIME IF  NEEDED  FOR  SLEEP. 180 Tab 0   • nateglinide (STARLIX) 120 MG Tab TAKE 1 TABLET BY MOUTH THREE TIMES DAILY BEFORE MEAL(S) 270 Tab 0   • finasteride (PROSCAR) 5 MG Tab TAKE 1 TABLET BY MOUTH ONCE DAILY 90 Tab 0   • glipiZIDE (GLUCOTROL) 10 MG Tab TAKE 1 TABLET BY MOUTH TWICE DAILY 180 Tab 0   • amLODIPine (NORVASC) 10 MG Tab TAKE 1 TABLET BY MOUTH ONCE DAILY 90 Tab 0   • metformin (GLUCOPHAGE) 1000 MG tablet TAKE 1 TABLET BY MOUTH TWICE DAILY WITH MEALS 180 Tab 1   • tamsulosin (FLOMAX) 0.4 MG capsule TAKE 2 CAPSULES BY MOUTH ONCE DAILY 180 Cap 1   • sertraline (ZOLOFT) 50 MG Tab TAKE 1 TABLET BY MOUTH ONCE DAILY 90 Tab 1   • losartan (COZAAR) 100 MG Tab TAKE 1 TABLET BY MOUTH ONCE DAILY 90 Tab 1   • celecoxib (CELEBREX) 200 MG Cap TAKE 1 CAPSULE BY MOUTH TWICE DAILY 180 Cap 3   • gabapentin (NEURONTIN) 300 MG Cap TAKE 2 CAPSULES BY MOUTH THREE TIMES DAILY 540 Cap 0   • Misc. Devices Misc One touch ultra test strips. Patient has DM type 2 and tests twice per day 100 Each 3   • Misc. Devices Misc One touch ultra lancets; patient has diabetes mellitus type 2 and test once per day 100 Device 3   • tizanidine (ZANAFLEX) 4 MG Tab Take 1 Tab by mouth every 6 hours as needed (back pain). (Patient taking differently: Take 4 mg by mouth every day.) 270 Tab 3   • omeprazole (PRILOSEC) 20 MG delayed-release capsule TAKE  "ONE CAPSULE BY MOUTH ONCE DAILY 90 Cap 3   • Misc. Devices Misc CPAP supplies; fax to [facility] [phone]. 100 Device 3   • fluticasone (FLONASE) 50 MCG/ACT nasal spray Spray 2 Sprays in nose every day. Each Nostril 1 Bottle 3   • albuterol (VENTOLIN OR PROVENTIL) 108 (90 BASE) MCG/ACT AERS inhalation aerosol Inhale 2 Puffs by mouth every 6 hours as needed for Shortness of Breath. 8.5 g 3   • Multiple Vitamins-Minerals (MULTIVITAMIN PO) Take 1 Tab by mouth every day.       • Fexofenadine HCl (ALLEGRA PO) Take  by mouth as needed.     • Cholecalciferol (VITAMIN D) 2000 UNIT CAPS Take  by mouth every day.     • GLUCOSAMINE-CHONDROITIN-MSM PO Take 2 Tabs by mouth 2 Times a Day.     • aspirin 81 MG tablet Take 81 mg by mouth every day.       No current facility-administered medications on file prior to visit.      Allergies:    Allergies   Allergen Reactions   • Penicillins Swelling     \"Tongue swells up\"   • Phenergan [Promethazine Hcl]      \"Jittery and hallucinating\"   • Influenza Virus Vacc      \"deathly ill\"       Vitals:    Vitals:    04/08/19 1438   BP: 148/80   Pulse: 88   Resp: 14   Temp: 36.9 °C (98.4 °F)   TempSrc: Temporal   SpO2: 94%   Weight: (!) 143.8 kg (317 lb)       Physical Exam:    Constitutional: Vital signs reviewed. Appears well-developed and well-nourished. Occl cough. No acute distress.   Eyes: Sclera white, conjunctivae clear.   ENT: External ears normal. Hearing normal. Nasal mucosa pink. Lips/teeth are normal. Oral mucosa pink and moist. Posterior pharynx: WNL.  Neck: Neck supple.   Cardiovascular: Regular rate and rhythm. No murmur.  Pulmonary/Chest: Respirations non-labored. Clear to auscultation bilaterally.  Lymph: Cervical nodes without tenderness or enlargement.  Musculoskeletal: Uses rolling walker to ambulate.  Neurological: Alert and oriented to person, place, and time. Muscle tone normal. Coordination normal.   Skin: No rashes or lesions. Warm, dry, normal turgor.  Psychiatric: Normal " mood and affect. Behavior is normal. Judgment and thought content normal.       Assessment / Plan:    1. Acute lower respiratory infection  - sulfamethoxazole-trimethoprim (BACTRIM DS) 800-160 MG tablet; 1 TAB TWICE A DAY X 10 DAYS.  Dispense: 20 Tab; Refill: 0    2. Acute bronchitis, unspecified organism    3. Cough  - Hydrocod Polst-CPM Polst ER (TUSSIONEX) 10-8 MG/5ML Suspension Extended Release; Take 5 mL by mouth every 12 hours as needed (TAKE IF NEEDED FOR NASAL SYMPTOMS OR COUGH. MAY CAUSE DROWSINESS.) for up to 7 days.  Dispense: 70 mL; Refill: 0      Discussed with him DDX, management options, and risks, benefits, and alternatives to treatment plan agreed upon.    Agreeable to medications prescribed.  report checked - last Rx was Zolpidem 10 mg #30 filled on 3/1/19. Last narcotic Rx was Oxycodone-APAP  mg #126 filled on 12/29/17.    Discussed expected course of duration, time for improvement, and to seek follow-up in Emergency Room, urgent care, or with PCP if getting worse at any time or not improving within expected time frame.

## 2019-04-15 RX ORDER — GABAPENTIN 300 MG/1
CAPSULE ORAL
Qty: 540 CAP | Refills: 0 | Status: SHIPPED | OUTPATIENT
Start: 2019-04-15 | End: 2020-07-20 | Stop reason: SDUPTHER

## 2019-04-15 NOTE — TELEPHONE ENCOUNTER
Was the patient seen in the last year in this department? Yes    Does patient have an active prescription for medications requested? No     Received Request Via: Pharmacy      Pt met protocol?: Yes   Pt last ov 11/18

## 2019-04-30 ENCOUNTER — HOSPITAL ENCOUNTER (OUTPATIENT)
Facility: MEDICAL CENTER | Age: 59
End: 2019-04-30
Attending: FAMILY MEDICINE
Payer: COMMERCIAL

## 2019-04-30 PROCEDURE — 82274 ASSAY TEST FOR BLOOD FECAL: CPT

## 2019-05-01 DIAGNOSIS — Z12.11 COLON CANCER SCREENING: ICD-10-CM

## 2019-05-01 LAB — HEMOCCULT STL QL IA: NEGATIVE

## 2019-05-02 ENCOUNTER — HOSPITAL ENCOUNTER (OUTPATIENT)
Dept: LAB | Facility: MEDICAL CENTER | Age: 59
End: 2019-05-02
Attending: FAMILY MEDICINE
Payer: COMMERCIAL

## 2019-05-02 DIAGNOSIS — E11.69 DIABETES MELLITUS TYPE 2 IN OBESE (HCC): ICD-10-CM

## 2019-05-02 DIAGNOSIS — E66.9 DIABETES MELLITUS TYPE 2 IN OBESE (HCC): ICD-10-CM

## 2019-05-02 LAB
EST. AVERAGE GLUCOSE BLD GHB EST-MCNC: 183 MG/DL
HBA1C MFR BLD: 8 % (ref 0–5.6)

## 2019-05-02 PROCEDURE — 83036 HEMOGLOBIN GLYCOSYLATED A1C: CPT

## 2019-05-02 PROCEDURE — 36415 COLL VENOUS BLD VENIPUNCTURE: CPT

## 2019-05-09 ENCOUNTER — OFFICE VISIT (OUTPATIENT)
Dept: MEDICAL GROUP | Facility: PHYSICIAN GROUP | Age: 59
End: 2019-05-09
Payer: COMMERCIAL

## 2019-05-09 VITALS
TEMPERATURE: 98.6 F | BODY MASS INDEX: 42.66 KG/M2 | HEIGHT: 72 IN | OXYGEN SATURATION: 95 % | SYSTOLIC BLOOD PRESSURE: 124 MMHG | WEIGHT: 315 LBS | HEART RATE: 80 BPM | DIASTOLIC BLOOD PRESSURE: 80 MMHG | RESPIRATION RATE: 20 BRPM

## 2019-05-09 DIAGNOSIS — E11.69 DIABETES MELLITUS TYPE 2 IN OBESE (HCC): ICD-10-CM

## 2019-05-09 DIAGNOSIS — E66.01 MORBID OBESITY WITH BMI OF 40.0-44.9, ADULT (HCC): ICD-10-CM

## 2019-05-09 DIAGNOSIS — E66.9 DIABETES MELLITUS TYPE 2 IN OBESE (HCC): ICD-10-CM

## 2019-05-09 DIAGNOSIS — F51.01 PRIMARY INSOMNIA: ICD-10-CM

## 2019-05-09 PROCEDURE — 99214 OFFICE O/P EST MOD 30 MIN: CPT | Performed by: FAMILY MEDICINE

## 2019-05-09 RX ORDER — ZOLPIDEM TARTRATE 10 MG/1
TABLET ORAL
Refills: 4 | COMMUNITY
Start: 2019-05-05 | End: 2019-06-12 | Stop reason: SDUPTHER

## 2019-05-09 NOTE — PROGRESS NOTES
Subjective:   Jamey Riojas is a 58 y.o. male here today for evaluation and management of:     Morbid obesity with BMI of 40.0-44.9, adult ([facility])  Encouraged gradual weight loss with calorie restriction.     Diabetes mellitus type 2 in obese  This is a chronic condition last visit A1c was 7.9, it has increased to 8   LDL below 70, urine microalbumin is normal.   Continues on glipizide 10 mg, metformin 1000 bid and starlix bid, he will increase it to TID and we will monitor to see if it helps improve blood sugars.    He got shakes first time in a long time last night as he was late with dinner.   Had ophthalmology visit done this year.     Insomnia  Chronic condition, controlled with ambien 10 mg.          Current medicines (including changes today)  Current Outpatient Prescriptions   Medication Sig Dispense Refill   • zolpidem (AMBIEN) 10 MG Tab   4   • gabapentin (NEURONTIN) 300 MG Cap TAKE 2 CAPSULES BY MOUTH THREE TIMES DAILY 540 Cap 0   • metoprolol SR (TOPROL XL) 25 MG TABLET SR 24 HR TAKE 1 TABLET BY MOUTH ONCE DAILY 90 Tab 0   • amitriptyline (ELAVIL) 50 MG Tab TAKE 1 TABLET BY MOUTH AT BEDTIME 90 Tab 0   • traZODone (DESYREL) 100 MG Tab TAKE 1 TO 2 TABLETS BY MOUTH ONCE DAILY AT BEDTIME IF  NEEDED  FOR  SLEEP. 180 Tab 0   • nateglinide (STARLIX) 120 MG Tab TAKE 1 TABLET BY MOUTH THREE TIMES DAILY BEFORE MEAL(S) 270 Tab 0   • finasteride (PROSCAR) 5 MG Tab TAKE 1 TABLET BY MOUTH ONCE DAILY 90 Tab 0   • glipiZIDE (GLUCOTROL) 10 MG Tab TAKE 1 TABLET BY MOUTH TWICE DAILY 180 Tab 0   • amLODIPine (NORVASC) 10 MG Tab TAKE 1 TABLET BY MOUTH ONCE DAILY 90 Tab 0   • metformin (GLUCOPHAGE) 1000 MG tablet TAKE 1 TABLET BY MOUTH TWICE DAILY WITH MEALS 180 Tab 1   • tamsulosin (FLOMAX) 0.4 MG capsule TAKE 2 CAPSULES BY MOUTH ONCE DAILY 180 Cap 1   • sertraline (ZOLOFT) 50 MG Tab TAKE 1 TABLET BY MOUTH ONCE DAILY 90 Tab 1   • losartan (COZAAR) 100 MG Tab TAKE 1 TABLET BY MOUTH ONCE DAILY 90 Tab 1   • celecoxib  (CELEBREX) 200 MG Cap TAKE 1 CAPSULE BY MOUTH TWICE DAILY 180 Cap 3   • Misc. Devices Misc One touch ultra test strips. Patient has DM type 2 and tests twice per day 100 Each 3   • Misc. Devices Misc One touch ultra lancets; patient has diabetes mellitus type 2 and test once per day 100 Device 3   • tizanidine (ZANAFLEX) 4 MG Tab Take 1 Tab by mouth every 6 hours as needed (back pain). (Patient taking differently: Take 4 mg by mouth every day.) 270 Tab 3   • omeprazole (PRILOSEC) 20 MG delayed-release capsule TAKE ONE CAPSULE BY MOUTH ONCE DAILY 90 Cap 3   • Misc. Devices Misc CPAP supplies; fax to [facility] [phone]. 100 Device 3   • albuterol (VENTOLIN OR PROVENTIL) 108 (90 BASE) MCG/ACT AERS inhalation aerosol Inhale 2 Puffs by mouth every 6 hours as needed for Shortness of Breath. 8.5 g 3   • Multiple Vitamins-Minerals (MULTIVITAMIN PO) Take 1 Tab by mouth every day.       • Fexofenadine HCl (ALLEGRA PO) Take  by mouth as needed.     • Cholecalciferol (VITAMIN D) 2000 UNIT CAPS Take  by mouth every day.     • GLUCOSAMINE-CHONDROITIN-MSM PO Take 2 Tabs by mouth 2 Times a Day.     • aspirin 81 MG tablet Take 81 mg by mouth every day.     • fluticasone (FLONASE) 50 MCG/ACT nasal spray Spray 2 Sprays in nose every day. Each Nostril 1 Bottle 3     No current facility-administered medications for this visit.      He  has a past medical history of Anesthesia; Anxiety; Arthritis; Body aches (11/19/2014); Bronchitis (2009); Chickenpox; Diabetes; Dysuria (7/30/2014); Enlarged liver; GERD (gastroesophageal reflux disease) (12/21/2011); Heart burn; Hypertension; Indigestion; Influenza; Insomnia (12/21/2011); Nephrolithiasis (7/30/2014); Obesity; BRAIN (obstructive sleep apnea); Pain (12-14-12); Psychiatric problem; Sleep apnea; and Snoring.    ROS  No chest pain, no shortness of breath, no abdominal pain       Objective:     /80 (BP Location: Left arm, Patient Position: Sitting, BP Cuff Size: Large adult)   Pulse 80    Temp 37 °C (98.6 °F) (Temporal)   Resp 20   Ht 1.829 m (6')   Wt (!) 146.5 kg (323 lb)   SpO2 95%  Body mass index is 43.81 kg/m².   Physical Exam:  Constitutional: Alert, no distress.  Skin: Warm, dry, good turgor, no rashes in visible areas.  Eye: Equal, round and reactive, conjunctiva clear, lids normal.  ENMT: Lips without lesions, good dentition, oropharynx clear.  Neck: Trachea midline, no masses, no thyromegaly. No cervical or supraclavicular lymphadenopathy  Respiratory: Unlabored respiratory effort, lungs clear to auscultation, no wheezes, no ronchi.  Cardiovascular: Normal S1, S2, no murmur, no edema.  Abdomen: Soft, non-tender, no masses, no hepatosplenomegaly.  Psych: Alert and oriented x3, normal affect and mood.        Assessment and Plan:   The following treatment plan was discussed    1. Morbid obesity with BMI of 40.0-44.9, adult (HCC)  No acute changes, encourage weight loss.     2. Diabetes mellitus type 2 in obese (HCC)  Increase nateglinide (starlix) to TID as prescribed.   - Comp Metabolic Panel; Future  - HEMOGLOBIN A1C; Future  - Lipid Profile; Future  - MICROALBUMIN CREAT RATIO URINE (LAB COLLECT); Future      Followup: Return in about 6 months (around 11/9/2019) for DM, right shoulder pain.

## 2019-05-09 NOTE — ASSESSMENT & PLAN NOTE
This is a chronic condition last visit A1c was 7.9, it has increased to 8   LDL below 70, urine microalbumin is normal.   Continues on glipizide 10 mg, metformin 1000 bid and starlix bid, he will increase it to TID and we will monitor to see if it helps improve blood sugars.    He got shakes first time in a long time last night as he was late with dinner.   Had ophthalmology visit done this year.

## 2019-06-05 RX ORDER — ZOLPIDEM TARTRATE 10 MG/1
TABLET ORAL
Refills: 4 | OUTPATIENT
Start: 2019-06-05

## 2019-06-06 RX ORDER — LOSARTAN POTASSIUM 100 MG/1
TABLET ORAL
Qty: 90 TAB | Refills: 1 | Status: SHIPPED | OUTPATIENT
Start: 2019-06-06 | End: 2019-12-20

## 2019-06-06 NOTE — TELEPHONE ENCOUNTER
Was the patient seen in the last year in this department? Yes    Does patient have an active prescription for medications requested? No     Received Request Via: Pharmacy    Pt met protocol?: Yes     Last OV 05/09/2019    BP Readings from Last 1 Encounters:   05/09/19 124/80

## 2019-06-06 NOTE — TELEPHONE ENCOUNTER
Please ask patient why new request. rx was done in May with refills. Thank you.   Ryann Street M.D.

## 2019-06-11 ENCOUNTER — TELEPHONE (OUTPATIENT)
Dept: MEDICAL GROUP | Facility: PHYSICIAN GROUP | Age: 59
End: 2019-06-11

## 2019-06-12 DIAGNOSIS — F51.01 PRIMARY INSOMNIA: ICD-10-CM

## 2019-06-12 RX ORDER — ZOLPIDEM TARTRATE 10 MG/1
10 TABLET ORAL NIGHTLY PRN
Qty: 30 TAB | Refills: 4 | Status: SHIPPED | OUTPATIENT
Start: 2019-06-12 | End: 2019-07-12

## 2019-06-12 NOTE — TELEPHONE ENCOUNTER
Phone Number Called: [phone]    Call outcome: spoke to patient regarding message below    Message: Please advise patient Rx for ambien ready for  in clinic. In future needs to be done at clinic visits please. Thank you   Ryann Street M.D. (Routing comment)

## 2019-06-12 NOTE — TELEPHONE ENCOUNTER
Was the patient seen in the last year in this department? Yes last seen 5/8/19    Does patient have an active prescription for medications requested? Yes    Received Request Via: Patient     Patient was last seen 5/8/19 and forgot to get his Ambien refilled. We currently have no openings for the rest of the month. Are we able to refill?

## 2019-06-24 RX ORDER — AMITRIPTYLINE HYDROCHLORIDE 50 MG/1
TABLET, FILM COATED ORAL
Qty: 90 TAB | Refills: 1 | Status: SHIPPED | OUTPATIENT
Start: 2019-06-24 | End: 2019-12-16

## 2019-06-24 RX ORDER — METOPROLOL SUCCINATE 25 MG/1
TABLET, EXTENDED RELEASE ORAL
Qty: 90 TAB | Refills: 1 | Status: SHIPPED | OUTPATIENT
Start: 2019-06-24 | End: 2019-12-20

## 2019-06-24 RX ORDER — NATEGLINIDE 120 MG/1
TABLET ORAL
Qty: 270 TAB | Refills: 1 | Status: SHIPPED | OUTPATIENT
Start: 2019-06-24 | End: 2019-09-12

## 2019-06-24 NOTE — TELEPHONE ENCOUNTER
Was the patient seen in the last year in this department? Yes    Does patient have an active prescription for medications requested? No     Received Request Via: Pharmacy    Pt met protocol?: Yes     Last OV 05/09/19    BP Readings from Last 1 Encounters:   05/09/19 124/80     Lab Results   Component Value Date/Time    HBA1C 8.0 (H) 05/02/2019 10:20 AM        Lab Results  Component Value Date/Time   CHOLSTRLTOT 157 11/08/2018 0903   TRIGLYCERIDE 282 (H) 11/08/2018 0903   HDL 40 11/08/2018 0903   LDL 61 11/08/2018 0903

## 2019-08-08 DIAGNOSIS — N40.1 BENIGN NODULAR PROSTATIC HYPERPLASIA WITH LOWER URINARY TRACT SYMPTOMS: ICD-10-CM

## 2019-08-08 NOTE — TELEPHONE ENCOUNTER
Was the patient seen in the last year in this department? Yes    Does patient have an active prescription for medications requested? No     Received Request Via: Pharmacy      Pt met protocol?: Yes   Pt last ov 5/19   Lab Results   Component Value Date/Time    CHOLSTRLTOT 157 11/08/2018 0903    TRIGLYCERIDE 282 (H) 11/08/2018 0903    HDL 40 11/08/2018 0903    LDL 61 11/08/2018 0903     Lab Results   Component Value Date/Time    HBA1C 8.0 (H) 05/02/2019 10:20 AM      Prostatic Specific Antigen Tot   Date Value Ref Range Status   11/08/2018 0.31 0.00 - 4.00 ng/mL Final     Comment:     The Access Hybritech PSA assay is a paramagnetic particle,  chemiluminescent immunoassay for the quantitative determination  of total prostate specific antigen (PSA) levels using the  Access Immunoassay System. Values obtained with different  methods cannot be used interchangeably for patient monitoring.

## 2019-08-12 RX ORDER — TAMSULOSIN HYDROCHLORIDE 0.4 MG/1
CAPSULE ORAL
Qty: 180 CAP | Refills: 0 | Status: SHIPPED | OUTPATIENT
Start: 2019-08-12 | End: 2019-11-14 | Stop reason: SDUPTHER

## 2019-08-15 ENCOUNTER — OFFICE VISIT (OUTPATIENT)
Dept: MEDICAL GROUP | Facility: PHYSICIAN GROUP | Age: 59
End: 2019-08-15
Payer: COMMERCIAL

## 2019-08-15 VITALS
HEIGHT: 74 IN | SYSTOLIC BLOOD PRESSURE: 136 MMHG | TEMPERATURE: 98.3 F | RESPIRATION RATE: 16 BRPM | BODY MASS INDEX: 40.43 KG/M2 | DIASTOLIC BLOOD PRESSURE: 78 MMHG | OXYGEN SATURATION: 95 % | WEIGHT: 315 LBS | HEART RATE: 71 BPM

## 2019-08-15 DIAGNOSIS — E11.69 DIABETES MELLITUS TYPE 2 IN OBESE (HCC): ICD-10-CM

## 2019-08-15 DIAGNOSIS — R10.32 LEFT LOWER QUADRANT PAIN: ICD-10-CM

## 2019-08-15 DIAGNOSIS — E66.9 DIABETES MELLITUS TYPE 2 IN OBESE (HCC): ICD-10-CM

## 2019-08-15 DIAGNOSIS — R10.84 GENERALIZED ABDOMINAL PAIN: ICD-10-CM

## 2019-08-15 DIAGNOSIS — E78.5 DYSLIPIDEMIA: ICD-10-CM

## 2019-08-15 PROCEDURE — 99214 OFFICE O/P EST MOD 30 MIN: CPT | Performed by: FAMILY MEDICINE

## 2019-08-15 RX ORDER — ZOLPIDEM TARTRATE 10 MG/1
TABLET ORAL
COMMUNITY
Start: 2019-08-09 | End: 2019-11-14 | Stop reason: SDUPTHER

## 2019-08-15 RX ORDER — OXYCODONE HYDROCHLORIDE AND ACETAMINOPHEN 5; 325 MG/1; MG/1
1-2 TABLET ORAL EVERY 8 HOURS PRN
Qty: 90 TAB | Refills: 0 | Status: SHIPPED | OUTPATIENT
Start: 2019-08-15 | End: 2019-08-30

## 2019-08-15 NOTE — ASSESSMENT & PLAN NOTE
Starting about a week ago on Sunday patient developed severe burning left lower abdominal pain that radiates to left upper abdomen.  With the first episode on Sunday patient had significant nausea no vomiting no fevers no diarrhea no constipation no blood in the stool no blood in the urine.  Patient has had a history of kidney stones as well as a history of colitis seen on his past CT scans.  He does not feel the symptoms are related to his food intake or bowel movements.  He has no pain with urination.  He notices also since the symptoms blood sugar has been out of control.  Last BM was this morning.  Patient notes the pain is quite severe he is unable to work paperwork provided today for work.  Since pain is so severe unlike his usual symptoms of GERD we should check another CT scan to rule out colitis or kidney stones.  ER precautions discussed with the patient if he develops severe distended abdomen hard abdomen severe nausea vomiting blood in the stool chills fevers that he should go to the ER.  Encouraged him to stay well-hydrated eat a bland diet avoid irritants like spicy foods fatty foods carbonated beverages.  Patient does not smoke or drink.  Pain is constant and worsening patient notes bending sitting standing or lying down with similar levels of pain that do not improve.  He takes Tylenol for his chronic low back pain this is not helped with this pain at all.  Patient has been on doses of narcotics in the past I will provide him a short prescription for pain control now.  He is advised not to take this with any other sedating medications including alcohol.  Urinalysis and CMP CBC ordered.  Along with CT scan of the abdomen and pelvis

## 2019-08-15 NOTE — LETTER
[facility]  Ryann Street M.D.  [address] [place]  [place] [address]  Fax: [phone]   Authorization for Release/Disclosure of   Protected Health Information   Name: CAROL RIOJAS : 1960 SSN: [id number]   Address: [address] [place] [address] [place] [address] Phone:    [phone] (home)    I authorize the entity listed below to release/disclose the PHI below to:   [facility]/Ryann Street M.D. and Ryann Street M.D.   Provider or Entity Name:  [address], [facility], [facility]   Phone:      Fax:     Reason for request: continuity of care   Information to be released:    [  ] LAST COLONOSCOPY,  including any PATH REPORT and follow-up  [  ] LAST FIT/COLOGUARD RESULT [  ] LAST DEXA  [  ] LAST MAMMOGRAM  [  ] LAST PAP  [  ] LAST LABS [ X ] RETINA EXAM REPORT  [  ] IMMUNIZATION RECORDS  [  ] Release all info      [  ] Check here and initial the line next to each item to release ALL health information INCLUDING  _____ Care and treatment for drug and / or alcohol abuse  _____ HIV testing, infection status, or AIDS  _____ Genetic Testing    DATES OF SERVICE OR TIME PERIOD TO BE DISCLOSED: _____________  I understand and acknowledge that:  * This Authorization may be revoked at any time by you in writing, except if your health information has already been used or disclosed.  * Your health information that will be used or disclosed as a result of you signing this authorization could be re-disclosed by the recipient. If this occurs, your re-disclosed health information may no longer be protected by State or Federal laws.  * You may refuse to sign this Authorization. Your refusal will not affect your ability to obtain treatment.  * This Authorization becomes effective upon signing and will  on (date) __________.      If no date is indicated, this Authorization will  one (1) year from the signature date.    Name: Carol Riojas    Signature:   Date:     8/15/2019          PLEASE FAX REQUESTED RECORDS BACK TO: [phone]

## 2019-08-16 ENCOUNTER — HOSPITAL ENCOUNTER (OUTPATIENT)
Dept: LAB | Facility: MEDICAL CENTER | Age: 59
End: 2019-08-16
Attending: FAMILY MEDICINE
Payer: COMMERCIAL

## 2019-08-16 DIAGNOSIS — E11.69 DIABETES MELLITUS TYPE 2 IN OBESE (HCC): ICD-10-CM

## 2019-08-16 DIAGNOSIS — E66.9 DIABETES MELLITUS TYPE 2 IN OBESE (HCC): ICD-10-CM

## 2019-08-16 DIAGNOSIS — R10.32 LEFT LOWER QUADRANT PAIN: ICD-10-CM

## 2019-08-16 LAB
ALBUMIN SERPL BCP-MCNC: 3.9 G/DL (ref 3.2–4.9)
ALBUMIN/GLOB SERPL: 1.3 G/DL
ALP SERPL-CCNC: 101 U/L (ref 30–99)
ALT SERPL-CCNC: 25 U/L (ref 2–50)
ANION GAP SERPL CALC-SCNC: 12 MMOL/L (ref 0–11.9)
APPEARANCE UR: ABNORMAL
AST SERPL-CCNC: 23 U/L (ref 12–45)
BACTERIA #/AREA URNS HPF: NEGATIVE /HPF
BASOPHILS # BLD AUTO: 0.4 % (ref 0–1.8)
BASOPHILS # BLD: 0.03 K/UL (ref 0–0.12)
BILIRUB SERPL-MCNC: 0.9 MG/DL (ref 0.1–1.5)
BILIRUB UR QL STRIP.AUTO: NEGATIVE
BUN SERPL-MCNC: 21 MG/DL (ref 8–22)
CALCIUM SERPL-MCNC: 9.3 MG/DL (ref 8.5–10.5)
CHLORIDE SERPL-SCNC: 106 MMOL/L (ref 96–112)
CHOLEST SERPL-MCNC: 152 MG/DL (ref 100–199)
CO2 SERPL-SCNC: 25 MMOL/L (ref 20–33)
COLOR UR: YELLOW
CREAT SERPL-MCNC: 0.92 MG/DL (ref 0.5–1.4)
CREAT UR-MCNC: 245.9 MG/DL
EOSINOPHIL # BLD AUTO: 0.19 K/UL (ref 0–0.51)
EOSINOPHIL NFR BLD: 2.8 % (ref 0–6.9)
EPI CELLS #/AREA URNS HPF: NEGATIVE /HPF
ERYTHROCYTE [DISTWIDTH] IN BLOOD BY AUTOMATED COUNT: 42.7 FL (ref 35.9–50)
FASTING STATUS PATIENT QL REPORTED: NORMAL
GLOBULIN SER CALC-MCNC: 2.9 G/DL (ref 1.9–3.5)
GLUCOSE SERPL-MCNC: 134 MG/DL (ref 65–99)
GLUCOSE UR STRIP.AUTO-MCNC: NEGATIVE MG/DL
HCT VFR BLD AUTO: 40.9 % (ref 42–52)
HDLC SERPL-MCNC: 44 MG/DL
HGB BLD-MCNC: 12.9 G/DL (ref 14–18)
HYALINE CASTS #/AREA URNS LPF: ABNORMAL /LPF
IMM GRANULOCYTES # BLD AUTO: 0.06 K/UL (ref 0–0.11)
IMM GRANULOCYTES NFR BLD AUTO: 0.9 % (ref 0–0.9)
KETONES UR STRIP.AUTO-MCNC: ABNORMAL MG/DL
LDLC SERPL CALC-MCNC: 65 MG/DL
LEUKOCYTE ESTERASE UR QL STRIP.AUTO: NEGATIVE
LYMPHOCYTES # BLD AUTO: 1.99 K/UL (ref 1–4.8)
LYMPHOCYTES NFR BLD: 29.5 % (ref 22–41)
MCH RBC QN AUTO: 27.9 PG (ref 27–33)
MCHC RBC AUTO-ENTMCNC: 31.5 G/DL (ref 33.7–35.3)
MCV RBC AUTO: 88.3 FL (ref 81.4–97.8)
MICRO URNS: ABNORMAL
MICROALBUMIN UR-MCNC: 0.7 MG/DL
MICROALBUMIN/CREAT UR: 3 MG/G (ref 0–30)
MONOCYTES # BLD AUTO: 0.51 K/UL (ref 0–0.85)
MONOCYTES NFR BLD AUTO: 7.6 % (ref 0–13.4)
NEUTROPHILS # BLD AUTO: 3.97 K/UL (ref 1.82–7.42)
NEUTROPHILS NFR BLD: 58.8 % (ref 44–72)
NITRITE UR QL STRIP.AUTO: NEGATIVE
NRBC # BLD AUTO: 0 K/UL
NRBC BLD-RTO: 0 /100 WBC
PH UR STRIP.AUTO: 5.5 [PH] (ref 5–8)
PLATELET # BLD AUTO: 179 K/UL (ref 164–446)
PMV BLD AUTO: 10.1 FL (ref 9–12.9)
POTASSIUM SERPL-SCNC: 4.2 MMOL/L (ref 3.6–5.5)
PROT SERPL-MCNC: 6.8 G/DL (ref 6–8.2)
PROT UR QL STRIP: NEGATIVE MG/DL
RBC # BLD AUTO: 4.63 M/UL (ref 4.7–6.1)
RBC # URNS HPF: ABNORMAL /HPF
RBC UR QL AUTO: NEGATIVE
SODIUM SERPL-SCNC: 143 MMOL/L (ref 135–145)
SP GR UR STRIP.AUTO: 1.04
TRIGL SERPL-MCNC: 213 MG/DL (ref 0–149)
UROBILINOGEN UR STRIP.AUTO-MCNC: 0.2 MG/DL
WBC # BLD AUTO: 6.8 K/UL (ref 4.8–10.8)
WBC #/AREA URNS HPF: ABNORMAL /HPF

## 2019-08-16 PROCEDURE — 83036 HEMOGLOBIN GLYCOSYLATED A1C: CPT

## 2019-08-16 PROCEDURE — 80053 COMPREHEN METABOLIC PANEL: CPT

## 2019-08-16 PROCEDURE — 82570 ASSAY OF URINE CREATININE: CPT

## 2019-08-16 PROCEDURE — 80061 LIPID PANEL: CPT

## 2019-08-16 PROCEDURE — 81001 URINALYSIS AUTO W/SCOPE: CPT

## 2019-08-16 PROCEDURE — 36415 COLL VENOUS BLD VENIPUNCTURE: CPT

## 2019-08-16 PROCEDURE — 85025 COMPLETE CBC W/AUTO DIFF WBC: CPT

## 2019-08-16 PROCEDURE — 82043 UR ALBUMIN QUANTITATIVE: CPT

## 2019-08-16 NOTE — PROGRESS NOTES
Subjective:   Jamey Riojas is a 59 y.o. male here today for evaluation and management of:     Left lower quadrant pain  Starting about a week ago on Sunday patient developed severe burning left lower abdominal pain that radiates to left upper abdomen.  With the first episode on Sunday patient had significant nausea no vomiting no fevers no diarrhea no constipation no blood in the stool no blood in the urine.  Patient has had a history of kidney stones as well as a history of colitis seen on his past CT scans.  He does not feel the symptoms are related to his food intake or bowel movements.  He has no pain with urination.  He notices also since the symptoms blood sugar has been out of control.  Last BM was this morning.  Patient notes the pain is quite severe he is unable to work paperwork provided today for work.  Since pain is so severe unlike his usual symptoms of GERD we should check another CT scan to rule out colitis or kidney stones.  ER precautions discussed with the patient if he develops severe distended abdomen hard abdomen severe nausea vomiting blood in the stool chills fevers that he should go to the ER.  Encouraged him to stay well-hydrated eat a bland diet avoid irritants like spicy foods fatty foods carbonated beverages.  Patient does not smoke or drink.  Pain is constant and worsening patient notes bending sitting standing or lying down with similar levels of pain that do not improve.  He takes Tylenol for his chronic low back pain this is not helped with this pain at all.  Patient has been on doses of narcotics in the past I will provide him a short prescription for pain control now.  He is advised not to take this with any other sedating medications including alcohol.  Urinalysis and CMP CBC ordered.  Along with CT scan of the abdomen and pelvis         Current medicines (including changes today)  Current Outpatient Medications   Medication Sig Dispense Refill   • zolpidem (AMBIEN) 10 MG  Tab      • oxyCODONE-acetaminophen (PERCOCET) 5-325 MG Tab Take 1-2 Tabs by mouth every 8 hours as needed for up to 15 days. 90 Tab 0   • tamsulosin (FLOMAX) 0.4 MG capsule TAKE 2 CAPSULES BY MOUTH ONCE DAILY 180 Cap 0   • metformin (GLUCOPHAGE) 1000 MG tablet TAKE 1 TABLET BY MOUTH TWICE DAILY WITH MEALS 180 Tab 0   • metoprolol SR (TOPROL XL) 25 MG TABLET SR 24 HR TAKE 1 TABLET BY MOUTH ONCE DAILY 90 Tab 1   • nateglinide (STARLIX) 120 MG Tab TAKE 1 TABLET BY MOUTH THREE TIMES DAILY BEFORE MEAL(S) 270 Tab 1   • amitriptyline (ELAVIL) 50 MG Tab TAKE 1 TABLET BY MOUTH AT BEDTIME 90 Tab 1   • losartan (COZAAR) 100 MG Tab TAKE 1 TABLET BY MOUTH ONCE DAILY 90 Tab 1   • amLODIPine (NORVASC) 10 MG Tab TAKE 1 TABLET BY MOUTH ONCE DAILY 90 Tab 3   • finasteride (PROSCAR) 5 MG Tab TAKE 1 TABLET BY MOUTH ONCE DAILY 90 Tab 3   • traZODone (DESYREL) 100 MG Tab TAKE 1 TO 2 TABLETS BY MOUTH ONCE DAILY AT BEDTIME IF  NEEDED  FOR  SLEEP 90 Tab 3   • glipiZIDE (GLUCOTROL) 10 MG Tab TAKE 1 TABLET BY MOUTH TWICE DAILY 180 Tab 3   • sertraline (ZOLOFT) 50 MG Tab TAKE 1 TABLET BY MOUTH ONCE DAILY 90 Tab 3   • gabapentin (NEURONTIN) 300 MG Cap TAKE 2 CAPSULES BY MOUTH THREE TIMES DAILY 540 Cap 0   • celecoxib (CELEBREX) 200 MG Cap TAKE 1 CAPSULE BY MOUTH TWICE DAILY 180 Cap 3   • Misc. Devices Misc One touch ultra test strips. Patient has DM type 2 and tests twice per day 100 Each 3   • Misc. Devices Misc One touch ultra lancets; patient has diabetes mellitus type 2 and test once per day 100 Device 3   • tizanidine (ZANAFLEX) 4 MG Tab Take 1 Tab by mouth every 6 hours as needed (back pain). (Patient taking differently: Take 4 mg by mouth every day.) 270 Tab 3   • omeprazole (PRILOSEC) 20 MG delayed-release capsule TAKE ONE CAPSULE BY MOUTH ONCE DAILY 90 Cap 3   • Misc. Devices Misc CPAP supplies; fax to [facility] [phone]. 100 Device 3   • fluticasone (FLONASE) 50 MCG/ACT nasal spray Spray 2 Sprays in nose every day. Each Nostril 1  "Bottle 3   • Multiple Vitamins-Minerals (MULTIVITAMIN PO) Take 1 Tab by mouth every day.       • Fexofenadine HCl (ALLEGRA PO) Take  by mouth as needed.     • Cholecalciferol (VITAMIN D) 2000 UNIT CAPS Take  by mouth every day.     • GLUCOSAMINE-CHONDROITIN-MSM PO Take 2 Tabs by mouth 2 Times a Day.     • aspirin 81 MG tablet Take 81 mg by mouth every day.     • albuterol (VENTOLIN OR PROVENTIL) 108 (90 BASE) MCG/ACT AERS inhalation aerosol Inhale 2 Puffs by mouth every 6 hours as needed for Shortness of Breath. 8.5 g 3     No current facility-administered medications for this visit.      He  has a past medical history of Anesthesia, Anxiety, Arthritis, Body aches (11/19/2014), Bronchitis (2009), Chickenpox, Diabetes, Dysuria (7/30/2014), Enlarged liver, GERD (gastroesophageal reflux disease) (12/21/2011), Heart burn, Hypertension, Indigestion, Influenza, Insomnia (12/21/2011), Nephrolithiasis (7/30/2014), Obesity, BRAIN (obstructive sleep apnea), Pain (12-14-12), Psychiatric problem, Sleep apnea, and Snoring.    ROS  No chest pain, no shortness of breath, no abdominal pain       Objective:     /78 (BP Location: Left arm, Patient Position: Sitting, BP Cuff Size: Large adult)   Pulse 71   Temp 36.8 °C (98.3 °F) (Temporal)   Resp 16   Ht 1.88 m (6' 2\")   Wt (!) 146.5 kg (323 lb)   SpO2 95%  Body mass index is 41.47 kg/m².   Physical Exam:  Constitutional: Alert, no distress.  Skin: Warm, dry, good turgor, no rashes in visible areas.  Eye: Equal, round and reactive, conjunctiva clear, lids normal.  ENMT: Lips without lesions, good dentition, oropharynx clear.  Neck: Trachea midline, no masses, no thyromegaly. No cervical or supraclavicular lymphadenopathy  Respiratory: Unlabored respiratory effort, lungs clear to auscultation, no wheezes, no ronchi.  Cardiovascular: Normal S1, S2, no murmur, no edema.  Abdomen: Soft, non-tender, no masses, no hepatosplenomegaly.  Psych: Alert and oriented x3, normal affect " and mood.        Assessment and Plan:   The following treatment plan was discussed    1. Left lower quadrant pain  Uncontrolled.   ER precautions discussed.   - CT-ABDOMEN-PELVIS WITH; Future  - oxyCODONE-acetaminophen (PERCOCET) 5-325 MG Tab; Take 1-2 Tabs by mouth every 8 hours as needed for up to 15 days.  Dispense: 90 Tab; Refill: 0  - CBC WITH DIFFERENTIAL; Future  - Comp Metabolic Panel; Future  - URINALYSIS CX IF IND    2. Generalized abdominal pain  - CT-ABDOMEN-PELVIS WITH; Future    3. Diabetes mellitus type 2 in obese (HCC)  Repeat labs.   - HEMOGLOBIN A1C; Future  - Lipid Profile; Future    4. Dyslipidemia  - Lipid Profile; Future      Followup: Return in about 4 weeks (around 9/12/2019) for abdominal pain review ct, labs.

## 2019-08-17 LAB
EST. AVERAGE GLUCOSE BLD GHB EST-MCNC: 192 MG/DL
HBA1C MFR BLD: 8.3 % (ref 0–5.6)

## 2019-09-12 ENCOUNTER — OFFICE VISIT (OUTPATIENT)
Dept: MEDICAL GROUP | Facility: PHYSICIAN GROUP | Age: 59
End: 2019-09-12
Payer: COMMERCIAL

## 2019-09-12 VITALS
HEIGHT: 74 IN | SYSTOLIC BLOOD PRESSURE: 106 MMHG | RESPIRATION RATE: 16 BRPM | DIASTOLIC BLOOD PRESSURE: 76 MMHG | OXYGEN SATURATION: 94 % | TEMPERATURE: 97.6 F | HEART RATE: 84 BPM | WEIGHT: 315 LBS | BODY MASS INDEX: 40.43 KG/M2

## 2019-09-12 DIAGNOSIS — R10.9 FLANK PAIN: ICD-10-CM

## 2019-09-12 DIAGNOSIS — R10.32 LEFT LOWER QUADRANT PAIN: ICD-10-CM

## 2019-09-12 DIAGNOSIS — E11.69 DIABETES MELLITUS TYPE 2 IN OBESE (HCC): ICD-10-CM

## 2019-09-12 DIAGNOSIS — E66.9 DIABETES MELLITUS TYPE 2 IN OBESE (HCC): ICD-10-CM

## 2019-09-12 PROBLEM — R19.7 DIARRHEA OF PRESUMED INFECTIOUS ORIGIN: Status: RESOLVED | Noted: 2018-02-13 | Resolved: 2019-09-12

## 2019-09-12 PROCEDURE — 99214 OFFICE O/P EST MOD 30 MIN: CPT | Performed by: FAMILY MEDICINE

## 2019-09-12 RX ORDER — PIOGLITAZONEHYDROCHLORIDE 30 MG/1
30 TABLET ORAL DAILY
Qty: 90 TAB | Refills: 3 | Status: SHIPPED | OUTPATIENT
Start: 2019-09-12 | End: 2020-09-11 | Stop reason: SDUPTHER

## 2019-09-12 ASSESSMENT — PATIENT HEALTH QUESTIONNAIRE - PHQ9
SUM OF ALL RESPONSES TO PHQ QUESTIONS 1-9: 6
4. FEELING TIRED OR HAVING LITTLE ENERGY: SEVERAL DAYS
1. LITTLE INTEREST OR PLEASURE IN DOING THINGS: SEVERAL DAYS
7. TROUBLE CONCENTRATING ON THINGS, SUCH AS READING THE NEWSPAPER OR WATCHING TELEVISION: NOT AT ALL
6. FEELING BAD ABOUT YOURSELF - OR THAT YOU ARE A FAILURE OR HAVE LET YOURSELF OR YOUR FAMILY DOWN: SEVERAL DAYS
9. THOUGHTS THAT YOU WOULD BE BETTER OFF DEAD, OR OF HURTING YOURSELF: NOT AT ALL
2. FEELING DOWN, DEPRESSED, IRRITABLE, OR HOPELESS: SEVERAL DAYS
3. TROUBLE FALLING OR STAYING ASLEEP OR SLEEPING TOO MUCH: SEVERAL DAYS
5. POOR APPETITE OR OVEREATING: NOT AT ALL
8. MOVING OR SPEAKING SO SLOWLY THAT OTHER PEOPLE COULD HAVE NOTICED. OR THE OPPOSITE, BEING SO FIGETY OR RESTLESS THAT YOU HAVE BEEN MOVING AROUND A LOT MORE THAN USUAL: SEVERAL DAYS
SUM OF ALL RESPONSES TO PHQ9 QUESTIONS 1 AND 2: 2

## 2019-09-12 NOTE — PROGRESS NOTES
Subjective:   Jamey Riojas is a 59 y.o. male here today for evaluation and management of:     Flank pain  Left-sided flank pain ongoing now for more than a month.  CT scan shows no colitis diverticulitis or nephrolithiasis.  Patient unable to work due to the pain.  He does have significant history of back surgery with instrumentation.  Will refer him urgently to physiatry for discussion of possible injections or epidural or nerve block to help with the pain.  Labs reviewed only mild anemia.  Pain is constant no improvement with opioid pain medication or muscle relaxants.  Patient is also on Elavil Celebrex and gabapentin which is not helped.  Patient has no blood in the urine but last year was found to have a large kidney stone but this was not seen on the recent CT scan.  Pain not improved by anything except mildly improved when he lays on his side or puts pressure on his side.  Patient has chronic intermittent diarrhea.  He has no blood in the stool pain is not worse after meals.    Diabetes mellitus type 2 in obese  A1c persistently above 8 last 2 checks currently metformin and glipizide Starlix.  Regimen changed to metformin Januvia and Actos.  Patient has no history of congestive heart failure.  He will let me know if cost is prohibitive or alternatives need to be prescribed.  Continues on aspirin, losartan with normal renal function  Patient is not on a statin and LDL is at goal 65         Current medicines (including changes today)  Current Outpatient Medications   Medication Sig Dispense Refill   • pioglitazone (ACTOS) 30 MG Tab Take 1 Tab by mouth every day. 90 Tab 3   • SITagliptin (JANUVIA) 100 MG Tab Take 1 Tab by mouth every day. 90 Tab 3   • zolpidem (AMBIEN) 10 MG Tab      • tamsulosin (FLOMAX) 0.4 MG capsule TAKE 2 CAPSULES BY MOUTH ONCE DAILY 180 Cap 0   • metformin (GLUCOPHAGE) 1000 MG tablet TAKE 1 TABLET BY MOUTH TWICE DAILY WITH MEALS 180 Tab 0   • metoprolol SR (TOPROL XL) 25 MG TABLET  SR 24 HR TAKE 1 TABLET BY MOUTH ONCE DAILY 90 Tab 1   • amitriptyline (ELAVIL) 50 MG Tab TAKE 1 TABLET BY MOUTH AT BEDTIME 90 Tab 1   • losartan (COZAAR) 100 MG Tab TAKE 1 TABLET BY MOUTH ONCE DAILY 90 Tab 1   • amLODIPine (NORVASC) 10 MG Tab TAKE 1 TABLET BY MOUTH ONCE DAILY 90 Tab 3   • finasteride (PROSCAR) 5 MG Tab TAKE 1 TABLET BY MOUTH ONCE DAILY 90 Tab 3   • traZODone (DESYREL) 100 MG Tab TAKE 1 TO 2 TABLETS BY MOUTH ONCE DAILY AT BEDTIME IF  NEEDED  FOR  SLEEP 90 Tab 3   • sertraline (ZOLOFT) 50 MG Tab TAKE 1 TABLET BY MOUTH ONCE DAILY 90 Tab 3   • gabapentin (NEURONTIN) 300 MG Cap TAKE 2 CAPSULES BY MOUTH THREE TIMES DAILY 540 Cap 0   • celecoxib (CELEBREX) 200 MG Cap TAKE 1 CAPSULE BY MOUTH TWICE DAILY 180 Cap 3   • Misc. Devices Misc One touch ultra test strips. Patient has DM type 2 and tests twice per day 100 Each 3   • Misc. Devices Misc One touch ultra lancets; patient has diabetes mellitus type 2 and test once per day 100 Device 3   • tizanidine (ZANAFLEX) 4 MG Tab Take 1 Tab by mouth every 6 hours as needed (back pain). (Patient taking differently: Take 4 mg by mouth every day.) 270 Tab 3   • omeprazole (PRILOSEC) 20 MG delayed-release capsule TAKE ONE CAPSULE BY MOUTH ONCE DAILY 90 Cap 3   • Misc. Devices Misc CPAP supplies; fax to [facility] [phone]. 100 Device 3   • fluticasone (FLONASE) 50 MCG/ACT nasal spray Spray 2 Sprays in nose every day. Each Nostril 1 Bottle 3   • albuterol (VENTOLIN OR PROVENTIL) 108 (90 BASE) MCG/ACT AERS inhalation aerosol Inhale 2 Puffs by mouth every 6 hours as needed for Shortness of Breath. 8.5 g 3   • Multiple Vitamins-Minerals (MULTIVITAMIN PO) Take 1 Tab by mouth every day.       • Fexofenadine HCl (ALLEGRA PO) Take  by mouth as needed.     • Cholecalciferol (VITAMIN D) 2000 UNIT CAPS Take  by mouth every day.     • GLUCOSAMINE-CHONDROITIN-MSM PO Take 2 Tabs by mouth 2 Times a Day.     • aspirin 81 MG tablet Take 81 mg by mouth every day.       No current  "facility-administered medications for this visit.      He  has a past medical history of Anesthesia, Anxiety, Arthritis, Body aches (11/19/2014), Bronchitis (2009), Chickenpox, Diabetes, Dysuria (7/30/2014), Enlarged liver, GERD (gastroesophageal reflux disease) (12/21/2011), Heart burn, Hypertension, Indigestion, Influenza, Insomnia (12/21/2011), Nephrolithiasis (7/30/2014), Obesity, BRAIN (obstructive sleep apnea), Pain (12-14-12), Psychiatric problem, Sleep apnea, and Snoring.    ROS  No chest pain, no shortness of breath, no abdominal pain       Objective:     /76 (BP Location: Left arm, Patient Position: Sitting, BP Cuff Size: Large adult)   Pulse 84   Temp 36.4 °C (97.6 °F) (Temporal)   Resp 16   Ht 1.88 m (6' 2\")   Wt (!) 144.7 kg (319 lb)   SpO2 94%  Body mass index is 40.96 kg/m².   Physical Exam:  Constitutional: Alert, no distress.  Skin: Warm, dry, good turgor, no rashes in visible areas.  Eye: Equal, round and reactive, conjunctiva clear, lids normal.  ENMT: Lips without lesions, good dentition, oropharynx clear.  Neck: Trachea midline, no masses, no thyromegaly. No cervical or supraclavicular lymphadenopathy  Respiratory: Unlabored respiratory effort, lungs clear to auscultation, no wheezes, no ronchi.  Cardiovascular: Normal S1, S2, no murmur, no edema.  Abdomen: Soft, ttp left flank, no masses, no hepatosplenomegaly.  Psych: Alert and oriented x3, normal affect and mood.        Assessment and Plan:   The following treatment plan was discussed    1. Diabetes mellitus type 2 in obese (HCC)  Medications changed A1c before next visit  - POCT Retinal Eye Exam  - pioglitazone (ACTOS) 30 MG Tab; Take 1 Tab by mouth every day.  Dispense: 90 Tab; Refill: 3  - SITagliptin (JANUVIA) 100 MG Tab; Take 1 Tab by mouth every day.  Dispense: 90 Tab; Refill: 3  - HEMOGLOBIN A1C; Future    2. Left lower quadrant pain  Unresolved referral done to physiatry  - REFERRAL TO PHYSIATRY ([facility])    3. Flank " pain  Unresolved urgent referral to physiatry.      Followup: Return for As scheduled.

## 2019-09-12 NOTE — LETTER
September 12, 2019    To whom it may concern:    Dank Riojas was evaluated by me in clinic today.  He has to follow-up with specialist for further evaluation and treatment.  Please excuse him from work until 10/12/2019.    Please contact me with any questions.    Thank you,            Ryann Street M.D.

## 2019-09-12 NOTE — ASSESSMENT & PLAN NOTE
A1c persistently above 8 last 2 checks currently metformin and glipizide Starlix.  Regimen changed to metformin Januvia and Actos.  Patient has no history of congestive heart failure.  He will let me know if cost is prohibitive or alternatives need to be prescribed.  Continues on aspirin, losartan with normal renal function  Patient is not on a statin and LDL is at goal 65

## 2019-09-12 NOTE — ASSESSMENT & PLAN NOTE
Left-sided flank pain ongoing now for more than a month.  CT scan shows no colitis diverticulitis or nephrolithiasis.  Patient unable to work due to the pain.  He does have significant history of back surgery with instrumentation.  Will refer him urgently to physiatry for discussion of possible injections or epidural or nerve block to help with the pain.  Labs reviewed only mild anemia.  Pain is constant no improvement with opioid pain medication or muscle relaxants.  Patient is also on Elavil Celebrex and gabapentin which is not helped.  Patient has no blood in the urine but last year was found to have a large kidney stone but this was not seen on the recent CT scan.  Pain not improved by anything except mildly improved when he lays on his side or puts pressure on his side.  Patient has chronic intermittent diarrhea.  He has no blood in the stool pain is not worse after meals.

## 2019-09-19 ENCOUNTER — TELEPHONE (OUTPATIENT)
Dept: MEDICAL GROUP | Facility: PHYSICIAN GROUP | Age: 59
End: 2019-09-19

## 2019-09-23 ENCOUNTER — HOSPITAL ENCOUNTER (OUTPATIENT)
Dept: RADIOLOGY | Facility: MEDICAL CENTER | Age: 59
End: 2019-09-23

## 2019-09-24 ENCOUNTER — OFFICE VISIT (OUTPATIENT)
Dept: PHYSICAL MEDICINE AND REHAB | Facility: MEDICAL CENTER | Age: 59
End: 2019-09-24
Payer: COMMERCIAL

## 2019-09-24 VITALS
OXYGEN SATURATION: 92 % | SYSTOLIC BLOOD PRESSURE: 138 MMHG | TEMPERATURE: 98 F | HEIGHT: 74 IN | BODY MASS INDEX: 40.43 KG/M2 | HEART RATE: 84 BPM | DIASTOLIC BLOOD PRESSURE: 68 MMHG | WEIGHT: 315 LBS

## 2019-09-24 DIAGNOSIS — M25.859 HIP IMPINGEMENT SYNDROME, UNSPECIFIED LATERALITY: ICD-10-CM

## 2019-09-24 DIAGNOSIS — Z98.890 PERSONAL HISTORY OF SPINE SURGERY: ICD-10-CM

## 2019-09-24 DIAGNOSIS — M54.16 LUMBAR RADICULOPATHY: ICD-10-CM

## 2019-09-24 DIAGNOSIS — M25.651 DECREASED RANGE OF MOTION OF BOTH HIPS: ICD-10-CM

## 2019-09-24 DIAGNOSIS — M25.652 DECREASED RANGE OF MOTION OF BOTH HIPS: ICD-10-CM

## 2019-09-24 DIAGNOSIS — R10.12 LEFT UPPER QUADRANT PAIN: ICD-10-CM

## 2019-09-24 DIAGNOSIS — R26.9 ABNORMAL GAIT: ICD-10-CM

## 2019-09-24 DIAGNOSIS — Z91.81 RISK FOR FALLS: ICD-10-CM

## 2019-09-24 DIAGNOSIS — R10.9 LEFT FLANK PAIN: ICD-10-CM

## 2019-09-24 DIAGNOSIS — M17.0 PRIMARY OSTEOARTHRITIS OF BOTH KNEES: ICD-10-CM

## 2019-09-24 PROCEDURE — 99245 OFF/OP CONSLTJ NEW/EST HI 55: CPT | Performed by: PHYSICAL MEDICINE & REHABILITATION

## 2019-09-24 PROCEDURE — 99358 PROLONG SERVICE W/O CONTACT: CPT | Performed by: PHYSICAL MEDICINE & REHABILITATION

## 2019-09-24 ASSESSMENT — PATIENT HEALTH QUESTIONNAIRE - PHQ9
SUM OF ALL RESPONSES TO PHQ QUESTIONS 1-9: 9
5. POOR APPETITE OR OVEREATING: 1 - SEVERAL DAYS
CLINICAL INTERPRETATION OF PHQ2 SCORE: 3

## 2019-09-24 ASSESSMENT — PAIN SCALES - GENERAL: PAINLEVEL: 7=MODERATE-SEVERE PAIN

## 2019-09-24 NOTE — PROGRESS NOTES
New patient note    Physiatry (physical medicine and  Rehabilitation), interventional spine and sports medicine    Date of Service: 9/24/2019    Chief complaint:   Chief Complaint   Patient presents with   • New Patient     Back pain        HISTORY    HPI: Jamey Riojas 59 y.o. male who presents today with Diagnoses of Left flank pain, Chronic left-sided thoracic back pain, Personal history of spine surgery 2010, 2012 low back and neck. Reports 2015 and 2017 mid and upper back surgery done by Dr Mohsen Chan. , Lumbar radiculopathy, Hip impingement syndrome bilateral, Decreased range of motion of both hips, and Primary osteoarthritis of both knees were pertinent to this visit.    The patient was referred by Dr. Ryann Street MD who requested that I see this patient.      HPI  spine surgery 2010, 2012 low back and neck. Reports 2015 and 2017 mid and upper back surgery done by Dr Mohsen Chan.     Here now with left flank pain, left upper quadrant pain. . Improves with palpation of the left lower quadrant. Constant 6/10 with flares to 10/10. Takes tylenol and celebrex with no improvement. Present for the past 6 months. Tried percocet with no improvement.  He takes gabapentin 600 mg 3 times daily which does help with the foot pain but is not helped with the flank pain.  Went to PT for 9 weeks including home exercise program with no improvement.  He denies rashes in the regions of pain. Denies rib pain. Denies thoracic back pain.     Reports neck pain  is stable.     Chronic bilateral knee pain, constant 3/10 pain, aching, nonradiating, worse with walking.     The patient's low back pain he is having intermittent pain which shoots down either leg, last for up to a few minutes and then resolves on its own.  While the pain is there it is severe and stops what he is doing but these are typically been self resolving.  Chronic.  In between his episodes overall the leg pain and back pain are stable and controlled.       Psychological testing for pain as depression and pain commonly coexist and need to both be treated.   Opioid Risk Score: 1    Interpretation of Opioid Risk Score   Score 0-3 = Low risk of abuse. Do UDS at least once per year.  Score 4-7 = Moderate risk of abuse. Do UDS 1-4 times per year.  Score 8+ = High risk of abuse. Refer to specialist.    PHQ  Depression Screen (PHQ-2/PHQ-9) 11/15/2018 9/12/2019 9/24/2019   PHQ-2 Total Score - 2 -   PHQ-2 Total Score - - -   PHQ-2 Total Score 0 - 3   PHQ-9 Total Score - 6 -   PHQ-9 Total Score - - 9       Interpretation of PHQ-9 Total Score   Score Severity   1-4 No Depression   5-9 Mild Depression   10-14 Moderate Depression   15-19 Moderately Severe Depression   20-27 Severe Depression     Medical records review:  I reviewed the note from the referring provider Ryann Street M.D. including the note dated 9/12/2019, 8/15/2019.  Left lower quadrant pain with Percocet given, urinalysis done, CBC done and CT abdomen pelvis was done.  Type 2 diabetes, Actos and Januvia were ordered as well as an A1c.      ROS:   Positive for wearing glasses, history of sinus surgery, depression stable now.  Denies SI, HI  Red Flags ROS:   Fever, Chills, Sweats: Denies  Involuntary Weight Loss: Denies  Bladder Incontinence: Denies  Bowel Incontinence: denies  Saddle Anesthesia: Denies    All other systems reviewed and negative.       PMHx:   Past Medical History:   Diagnosis Date   • Anesthesia     mother has hard time waking up   • Anxiety    • Arthritis     knees and shoulder joints and hands   • Body aches 11/19/2014   • Bronchitis 2009   • Chickenpox    • Diabetes     oral medication and diet   • Dysuria 7/30/2014   • Enlarged liver    • GERD (gastroesophageal reflux disease) 12/21/2011   • Heart burn    • Hypertension    • Indigestion    • Influenza    • Insomnia 12/21/2011   • Nephrolithiasis 7/30/2014    This is a recurring problem. Patient states that he was evaluated for a kidney  stone 2012. CT scan showed a stone that was not obstructing at that time. He has been having increasing right-sided back pain for approximately the last month. He has not had any fever or chills.   • Obesity    • BRAIN (obstructive sleep apnea)    • Pain 12-14-12    lower back,neck, 5/10   • Psychiatric problem     depression   • Sleep apnea     CPAP, O2 @ HS 2.5 liters   • Snoring          Current Outpatient Medications on File Prior to Visit   Medication Sig Dispense Refill   • linagliptin (TRADJENTA) 5 MG Tab tablet Take 1 Tab by mouth every day. 90 Tab 3   • pioglitazone (ACTOS) 30 MG Tab Take 1 Tab by mouth every day. 90 Tab 3   • zolpidem (AMBIEN) 10 MG Tab      • tamsulosin (FLOMAX) 0.4 MG capsule TAKE 2 CAPSULES BY MOUTH ONCE DAILY 180 Cap 0   • metoprolol SR (TOPROL XL) 25 MG TABLET SR 24 HR TAKE 1 TABLET BY MOUTH ONCE DAILY 90 Tab 1   • amitriptyline (ELAVIL) 50 MG Tab TAKE 1 TABLET BY MOUTH AT BEDTIME 90 Tab 1   • losartan (COZAAR) 100 MG Tab TAKE 1 TABLET BY MOUTH ONCE DAILY 90 Tab 1   • amLODIPine (NORVASC) 10 MG Tab TAKE 1 TABLET BY MOUTH ONCE DAILY 90 Tab 3   • finasteride (PROSCAR) 5 MG Tab TAKE 1 TABLET BY MOUTH ONCE DAILY 90 Tab 3   • traZODone (DESYREL) 100 MG Tab TAKE 1 TO 2 TABLETS BY MOUTH ONCE DAILY AT BEDTIME IF  NEEDED  FOR  SLEEP 90 Tab 3   • sertraline (ZOLOFT) 50 MG Tab TAKE 1 TABLET BY MOUTH ONCE DAILY 90 Tab 3   • gabapentin (NEURONTIN) 300 MG Cap TAKE 2 CAPSULES BY MOUTH THREE TIMES DAILY 540 Cap 0   • celecoxib (CELEBREX) 200 MG Cap TAKE 1 CAPSULE BY MOUTH TWICE DAILY 180 Cap 3   • Misc. Devices Misc One touch ultra test strips. Patient has DM type 2 and tests twice per day 100 Each 3   • Misc. Devices Misc One touch ultra lancets; patient has diabetes mellitus type 2 and test once per day 100 Device 3   • tizanidine (ZANAFLEX) 4 MG Tab Take 1 Tab by mouth every 6 hours as needed (back pain). (Patient taking differently: Take 4 mg by mouth every day.) 270 Tab 3   • omeprazole (PRILOSEC)  20 MG delayed-release capsule TAKE ONE CAPSULE BY MOUTH ONCE DAILY 90 Cap 3   • Misc. Devices Misc CPAP supplies; fax to [facility] [phone]. 100 Device 3   • fluticasone (FLONASE) 50 MCG/ACT nasal spray Spray 2 Sprays in nose every day. Each Nostril 1 Bottle 3   • albuterol (VENTOLIN OR PROVENTIL) 108 (90 BASE) MCG/ACT AERS inhalation aerosol Inhale 2 Puffs by mouth every 6 hours as needed for Shortness of Breath. 8.5 g 3   • Multiple Vitamins-Minerals (MULTIVITAMIN PO) Take 1 Tab by mouth every day.       • Fexofenadine HCl (ALLEGRA PO) Take  by mouth as needed.     • Cholecalciferol (VITAMIN D) 2000 UNIT CAPS Take  by mouth every day.     • GLUCOSAMINE-CHONDROITIN-MSM PO Take 2 Tabs by mouth 2 Times a Day.     • aspirin 81 MG tablet Take 81 mg by mouth every day.     • metformin (GLUCOPHAGE) 1000 MG tablet TAKE 1 TABLET BY MOUTH TWICE DAILY WITH MEALS (Patient not taking: Reported on 9/24/2019) 180 Tab 0     No current facility-administered medications on file prior to visit.         PSHx:   Past Surgical History:   Procedure Laterality Date   • LUMBAR FUSION POSTERIOR  12/30/2012    Performed by Benjamin Kumar M.D. at SURGERY [facility]   • CERVICAL DISK AND FUSION ANTERIOR  11/30/2012    Performed by Benjamin Kumar M.D. at [facility]   • HARDWARE REMOVAL ORTHO  11/30/2012    Performed by Benjamin Kumar M.D. at SURGERY [facility]   • KNEE ARTHROSCOPY  9/18/2012    Performed by Servando Long M.D. at SURGERY [facility]   • MEDIAL MENISCECTOMY  9/18/2012    Performed by Servando Long M.D. at SURGERY [facility]   • DEBRIDEMENT  9/18/2012    Performed by Servando Long M.D. at [facility]   • LUMBAR FUSION POSTERIOR  6/18/2010    Performed by BENJAMIN KUMAR at [facility]   • LUMBAR DECOMPRESSION  6/18/2010    Performed by BENJAMIN KUMAR at [facility]   • KNEE ARTHROSCOPY  10/20/2009    Performed by SERVANDO LONG at [facility]  [facility]S   • MEDIAL MENISCECTOMY  10/20/2009    Performed by SERVANDO LONG at SURGERY [facility] ORS   • CERVICAL FUSION POSTERIOR  9/2007    c -3-4-5    • CHOLECYSTECTOMY  1984    open   • FOOT SURGERY      left  Farrar's neuroma and heel spur       Family history   Family History   Problem Relation Age of Onset   • Other Mother         osteopenia   • Hypertension Mother    • Psychiatric Illness Mother    • Other Father         brain tumor   • Heart Attack Maternal Grandfather    • Sleep Apnea Neg Hx          Medications: reviewed on epic.   Outpatient Medications Marked as Taking for the 9/24/19 encounter (Office Visit) with Victoriano Farmer M.D.   Medication Sig Dispense Refill   • linagliptin (TRADJENTA) 5 MG Tab tablet Take 1 Tab by mouth every day. 90 Tab 3   • pioglitazone (ACTOS) 30 MG Tab Take 1 Tab by mouth every day. 90 Tab 3   • zolpidem (AMBIEN) 10 MG Tab      • tamsulosin (FLOMAX) 0.4 MG capsule TAKE 2 CAPSULES BY MOUTH ONCE DAILY 180 Cap 0   • metoprolol SR (TOPROL XL) 25 MG TABLET SR 24 HR TAKE 1 TABLET BY MOUTH ONCE DAILY 90 Tab 1   • amitriptyline (ELAVIL) 50 MG Tab TAKE 1 TABLET BY MOUTH AT BEDTIME 90 Tab 1   • losartan (COZAAR) 100 MG Tab TAKE 1 TABLET BY MOUTH ONCE DAILY 90 Tab 1   • amLODIPine (NORVASC) 10 MG Tab TAKE 1 TABLET BY MOUTH ONCE DAILY 90 Tab 3   • finasteride (PROSCAR) 5 MG Tab TAKE 1 TABLET BY MOUTH ONCE DAILY 90 Tab 3   • traZODone (DESYREL) 100 MG Tab TAKE 1 TO 2 TABLETS BY MOUTH ONCE DAILY AT BEDTIME IF  NEEDED  FOR  SLEEP 90 Tab 3   • sertraline (ZOLOFT) 50 MG Tab TAKE 1 TABLET BY MOUTH ONCE DAILY 90 Tab 3   • gabapentin (NEURONTIN) 300 MG Cap TAKE 2 CAPSULES BY MOUTH THREE TIMES DAILY 540 Cap 0   • celecoxib (CELEBREX) 200 MG Cap TAKE 1 CAPSULE BY MOUTH TWICE DAILY 180 Cap 3   • Misc. Devices Misc One touch ultra test strips. Patient has DM type 2 and tests twice per day 100 Each 3   • Misc. Devices Misc One touch ultra lancets; patient has diabetes mellitus type  "2 and test once per day 100 Device 3   • tizanidine (ZANAFLEX) 4 MG Tab Take 1 Tab by mouth every 6 hours as needed (back pain). (Patient taking differently: Take 4 mg by mouth every day.) 270 Tab 3   • omeprazole (PRILOSEC) 20 MG delayed-release capsule TAKE ONE CAPSULE BY MOUTH ONCE DAILY 90 Cap 3   • Misc. Devices Misc CPAP supplies; fax to [facility] [phone]. 100 Device 3   • fluticasone (FLONASE) 50 MCG/ACT nasal spray Spray 2 Sprays in nose every day. Each Nostril 1 Bottle 3   • albuterol (VENTOLIN OR PROVENTIL) 108 (90 BASE) MCG/ACT AERS inhalation aerosol Inhale 2 Puffs by mouth every 6 hours as needed for Shortness of Breath. 8.5 g 3   • Multiple Vitamins-Minerals (MULTIVITAMIN PO) Take 1 Tab by mouth every day.       • Fexofenadine HCl (ALLEGRA PO) Take  by mouth as needed.     • Cholecalciferol (VITAMIN D) 2000 UNIT CAPS Take  by mouth every day.     • GLUCOSAMINE-CHONDROITIN-MSM PO Take 2 Tabs by mouth 2 Times a Day.     • aspirin 81 MG tablet Take 81 mg by mouth every day.          Allergies:   Allergies   Allergen Reactions   • Penicillins Swelling     \"Tongue swells up\"   • Phenergan [Promethazine Hcl]      \"Jittery and hallucinating\"   • Influenza Virus Vacc      \"deathly ill\"       Social Hx:   Social History     Socioeconomic History   • Marital status:      Spouse name: Not on file   • Number of children: Not on file   • Years of education: Not on file   • Highest education level: Not on file   Occupational History   • Not on file   Social Needs   • Financial resource strain: Not on file   • Food insecurity:     Worry: Not on file     Inability: Not on file   • Transportation needs:     Medical: Not on file     Non-medical: Not on file   Tobacco Use   • Smoking status: Never Smoker   • Smokeless tobacco: Never Used   Substance and Sexual Activity   • Alcohol use: No     Alcohol/week: 0.0 oz   • Drug use: No   • Sexual activity: Yes     Partners: Female   Lifestyle   • Physical activity:     " "Days per week: Not on file     Minutes per session: Not on file   • Stress: Not on file   Relationships   • Social connections:     Talks on phone: Not on file     Gets together: Not on file     Attends Buddhism service: Not on file     Active member of club or organization: Not on file     Attends meetings of clubs or organizations: Not on file     Relationship status: Not on file   • Intimate partner violence:     Fear of current or ex partner: Not on file     Emotionally abused: Not on file     Physically abused: Not on file     Forced sexual activity: Not on file   Other Topics Concern   •  Service No   • Blood Transfusions Yes   • Caffeine Concern No   • Occupational Exposure No   • Hobby Hazards No   • Sleep Concern Yes   • Stress Concern Yes   • Weight Concern Yes   • Special Diet No   • Back Care No   • Exercise No   • Bike Helmet No   • Seat Belt Yes   • Self-Exams Yes   Social History Narrative   • Not on file         EXAMINATION     Physical Exam:   Vitals: /68 (BP Location: Right arm, Patient Position: Sitting, BP Cuff Size: Adult)   Pulse 84   Temp 36.7 °C (98 °F) (Temporal)   Ht 1.88 m (6' 2\")   Wt (!) 146.7 kg (323 lb 6.6 oz)   SpO2 92%     Constitutional:   Body Habitus: Body mass index is 41.52 kg/m².  Cooperation: Fully cooperates with exam  Appearance: Well-groomed, well-nourished, not disheveled     Eyes: No scleral icterus to suggest severe liver disease, no proptosis to suggest severe hyperthyroid    ENT -no obvious auditory deficits, no obvious tongue lesions, tongue midline, no facial droop     Skin -no rashes or lesions noted     Respiratory-  breathing comfortable on room air, no audible wheezing    Cardiovascular- capillary refills less than 2 seconds. No lower extremity edema is noted.     Gastrointestinal - no obvious abdominal masses, positive for mild tenderness palpation in the left flank and in the left upper quadrant.  This is below the levels of the " ribs.negative bilaterally    Psychiatric- alert and oriented ×3. Normal affect.       Musculoskeletal -   Cervical spine   Inspection: No deformities of the skin over the cervical spine. No rashes or lesions.    decreased  active range of motion in all directions, without  pain      Spurling’s sign: negative bilaterally    No signs of muscular atrophy in bilateral upper extremities     No tenderness to palpation of the cervical facets      Thoracic/Lumbar Spine/Sacral Spine/Hips   Inspection: No evidence of atrophy in bilateral lower extremities throughout     ROM: decreased active range of motion with flexion, lateral flexion, and rotation bilaterally.   There is decreased active range of motion with lumbar extension.  However there is no pain in the lumbar spine or thoracic spine    Palpation:   No tenderness to palpation in midline at T1-T12 levels. No tenderness to palpation in the left and right of the midline T1-L5, NEGATIVE for tenderness to palpation to the para-midline region in the lower lumbar levels.  No tenderness palpation along the ribs.    HIP  FAIR test positive bilaterally, however this is not his index pain that he came in with  Range of motion in the hips is decreased in the bilateral hips significantly especially with internal rotation with -5 degrees of internal rotation bilaterally.         Neuro       Key points for the international standards for neurological classification of spinal cord injury (ISNCSCI) to light touch.     Dermatome R L   C4 2 2   C5 2 2   C6 2 2   C7 2 2   C8 2 2   T1 2 2   T2 2 2   L2 2 2   L3 2 2   L4 2 2   L5 2 2   S1 2 2   S2 2 2           Motor Exam Upper Extremities   ? Myotome R L   Shoulder flexion C5 5 5   Elbow flexion C5 5 5   Wrist extension C6 5 5   Elbow extension C7 5 5   Finger flexion C8 5 5   Finger abduction T1 5 5         Motor Exam Lower Extremities    ? Myotome R L   Hip flexion L2 5 5   Knee extension L3 5 5   Ankle dorsiflexion L4 5 5   Toe  extension L5 5 5   Ankle plantarflexion S1 5 5           Lindo’s sign negative bilaterally         MEDICAL DECISION MAKING    Medical records review: see under HPI section.     DATA    Labs:   Lab Results   Component Value Date/Time    SODIUM 143 08/16/2019 11:01 AM    POTASSIUM 4.2 08/16/2019 11:01 AM    CHLORIDE 106 08/16/2019 11:01 AM    CO2 25 08/16/2019 11:01 AM    ANION 12.0 (H) 08/16/2019 11:01 AM    GLUCOSE 134 (H) 08/16/2019 11:01 AM    BUN 21 08/16/2019 11:01 AM    CREATININE 0.92 08/16/2019 11:01 AM    CREATININE 0.8 09/10/2007 10:15 AM    CALCIUM 9.3 08/16/2019 11:01 AM    ASTSGOT 23 08/16/2019 11:01 AM    ALTSGPT 25 08/16/2019 11:01 AM    TBILIRUBIN 0.9 08/16/2019 11:01 AM    ALBUMIN 3.9 08/16/2019 11:01 AM    TOTPROTEIN 6.8 08/16/2019 11:01 AM    GLOBULIN 2.9 08/16/2019 11:01 AM    AGRATIO 1.3 08/16/2019 11:01 AM   ]    No results found for: PROTHROMBTM, INR     Lab Results   Component Value Date/Time    WBC 6.8 08/16/2019 11:01 AM    RBC 4.63 (L) 08/16/2019 11:01 AM    HEMOGLOBIN 12.9 (L) 08/16/2019 11:01 AM    HEMATOCRIT 40.9 (L) 08/16/2019 11:01 AM    MCV 88.3 08/16/2019 11:01 AM    MCH 27.9 08/16/2019 11:01 AM    MCHC 31.5 (L) 08/16/2019 11:01 AM    MPV 10.1 08/16/2019 11:01 AM    NEUTSPOLYS 58.80 08/16/2019 11:01 AM    LYMPHOCYTES 29.50 08/16/2019 11:01 AM    MONOCYTES 7.60 08/16/2019 11:01 AM    EOSINOPHILS 2.80 08/16/2019 11:01 AM    BASOPHILS 0.40 08/16/2019 11:01 AM    HYPOCHROMIA 1+ 12/30/2013 10:26 AM        Lab Results   Component Value Date/Time    HBA1C 8.3 (H) 08/16/2019 11:01 AM        Imaging:   I personally reviewed following images, these are my reads  CT abdomen pelvis with contrast 9/3/2019  I reviewed the study specifically look at the patient's spine.  Please see radiologist dictation for the remainder of the study.  There is prominence of the head neck junction in the bilateral hips consistent with hip impingement syndrome.  Cystic structure in the anterolateral portion of the  femoral head neck junction consistent with pits pit in the bilateral femoral necks consistent with hip impingement syndrome.  Fusion of multiple levels in the lumbar spine with removal of hardware in the lower lumbar levels with significant bony remodeling and severe facet arthropathy and sacroiliac joint arthropathy of the bilateral SI joints.    IMAGING radiology reads. I reviewed the following radiology reads   CT abdomen pelvis with contrast 9/3/2019                                    Results for orders placed during the hospital encounter of 08/01/07   MR-CERVICAL SPINE-W/O    Impression IMPRESSION:    1. LATERAL VERTEBRAL SPUR AT C4-5 ON THE RIGHT, RESULTING IN MODERATELY   SEVERE RIGHT-SIDED NEURAL FORAMINAL STENOSIS.      2. NO COMPRESSION OF NEURAL STRUCTURES AT THE OTHER LEVELS.      [email or web address]        Read By AZAR ROME MD on Aug  1 2007  9:24AM  : SHYLA Transcription Date: Aug  1 2007 12:25PM  THIS DOCUMENT HAS BEEN ELECTRONICALLY SIGNED BY: AZAR ROME MD on   Aug  1 2007  5:48PM           Results for orders placed in visit on 10/09/12   MR-LUMBAR SPINE-W/O    Impression 1. There are postsurgical and degenerative changes in the lumbar spine as described above.  2. There is diffuse disk bulge and ligamentum flavum hypertrophy at L2-3 causing mild to moderate central canal and mild neural foraminal stenosis.  This has slightly progressed since the prior study.                Results for orders placed in visit on 10/09/12   MR-LUMBAR SPINE-W/O    Impression 1. There are postsurgical and degenerative changes in the lumbar spine as described above.  2. There is diffuse disk bulge and ligamentum flavum hypertrophy at L2-3 causing mild to moderate central canal and mild neural foraminal stenosis.  This has slightly progressed since the prior study.                                                                 Results for orders placed during the hospital encounter of 08/04/08      DX-ANKLE 3+ VIEWS    Impression IMPRESSION:     NO ACUTE FRACTURE LEFT ANKLE.      JAL/tamikow      Read By MARLYN STRAUSS MD on Aug  4 2008  5:00PM  : LINDSAY Transcription Date: Aug  5 2008  8:03AM  THIS DOCUMENT HAS BEEN ELECTRONICALLY SIGNED BY: MARLYN STRAUSS MD on   Aug  8 2008  9:04AM         Results for orders placed during the hospital encounter of 11/30/12   DX-CERVICAL SPINE-2 OR 3 VIEWS    Impression Intraoperative fluoroscopy spot images as described above. .          Results for orders placed in visit on 06/08/17   DX-CHEST-2 VIEWS    Impression No acute cardiopulmonary abnormality identified.                                          Results for orders placed in visit on 05/16/16   DX-KNEE COMPLETE 4+ LEFT    Impression 1.  There is a small joint effusion but no displaced fracture of the left knee.  2.  There is degenerative change predominantly involving the medial patellofemoral compartment.      Results for orders placed during the hospital encounter of 10/12/18   DX-LUMBAR SPINE-2 OR 3 VIEWS    Impression Possible slight increased L2-3 disc space narrowing. No other significant change.      Results for orders placed in visit on 06/23/16   DX-LUMBAR SPINE-4+ VIEWS    Impression Previous surgery. No abnormal motion in flexion and extension.          Results for orders placed in visit on 10/21/15   [id number] (WITH 1-VIEW CXR) LEFT    Impression 1.  Unremarkable left rib series.                      Results for orders placed during the hospital encounter of 10/12/18   DX-THORACIC SPINE-2 VIEWS    Impression 3 mm anterior offset of T8 on T9 which appears to be new.    No interval change in the visible extent of thoracolumbar posterior fusion.    Extensive anterior bridging osteophyte formation which may have increased.                 Diagnosis   Visit Diagnoses     ICD-10-CM   1. Left flank pain R10.9   2. Chronic left-sided thoracic back pain M54.6    G89.29   3. Personal  history of spine surgery 2010, 2012 low back and neck. Reports 2015 and 2017 mid and upper back surgery done by Dr Mohsen Chan.  Z98.890   4. Lumbar radiculopathy M54.16   5. Hip impingement syndrome bilateral M25.859   6. Decreased range of motion of both hips M25.651    M25.652   7. Primary osteoarthritis of both knees M17.0           ASSESSMENT AND PLAN:  Jamey Riojas 59 y.o. male      Jamey was seen today for new patient.    Diagnoses and all orders for this visit:    Personal history of spine surgery 2010, 2012 low back and neck. Reports 2015 and 2017 mid and upper back surgery done by Dr Mohsen Chan.     Lumbar radiculopathy  Comments:  intermittent. stable now.     Hip impingement syndrome bilateral  Comments:  Not currently affecting gait but may in the future.  Patient may follow up with PCP for imaging results. But I do not believe this is affecting his index pain.   Orders:  -     DX-HIP-BILATERAL-WITH PELVIS-3/4 VIEWS; Future    Decreased range of motion of both hips  Comments:  Not currently affecting gait but may in the future. Patient may follow up with PCP for imaging results. But I do not believe this is affecting his index pain.   Orders:  -     DX-HIP-BILATERAL-WITH PELVIS-3/4 VIEWS; Future    Primary osteoarthritis of both knees  Comments:  stable. monitor for now.     Abnormal gait    Risk for falls  Comments:  Recommend use of cane or walker at all times to help reduce fall risk    Left upper quadrant pain    Left flank pain    For the patient's index pain of left flank pain and left upper quadrant pain, I do not believe this is secondary to a thoracic radiculopathy, rib pain, thoracic pathology or musculoskeletal pathology.  I do not believe the patient would benefit from injection therapy in these regions.  The etiology remains unclear however the patient has referrals to gastroenterology neurology from the patient's PCP.    I recommend considering diclofenac gel if there are no  contraindications medically.  Also consider topical lidocaine or lidocaine patches over the area of pain.      Outside records requested:  The patient signed outside records request form for his outside records including outside images. Including from Dr Mohsen Chan      Follow-up: I asked the patient to follow up with his PCP Ryann Street M.D.. Follow up PRN with me        Please note that this dictation was created using voice recognition software. I have made every reasonable attempt to correct obvious errors but there may be errors of grammar and content that I may have overlooked prior to finalization of this note.      Victoriano Farmer MD  Physical Medicine and Rehabilitation  Interventional Spine and Sports Physiatry  [facility] Ryann Street M.D.     Addendum:  Prolonged non-face-to-face time was spent on 9/27/2019  from 0536 to 0611 by this reviewer.    An extensive record review was done which relates to the care of this patient. I reviewed 78 pages of data from [facility] spine.     The notes from the office of Mohsen Chan MD of spine surgery.  10/4/2018.  Patient continued to have right-sided low back pain with difficulty standing.  He was a year and 9 months post extension of the fusion from T9 down to the sacrum previously fused L1 to sacrum.  This is Dr. kyphosis and severe pain.  X-rays ordered for work-up.  The patient was also thought to have pain secondary to SI joint dysfunction.  They considered an SI joint injection and that was ordered and the patient was sent to Dr. Scanlon's office.  He was [place] a body pillow through [facility]  Patient was still using a walker the majority of the time.  Denies radicular pain in the legs.  It was noted that he did have a left knee meniscus tear.  Immediately postoperative patient did have a seroma however this resolved. Patient also had an L1-L3 posterior hardware fusion with decompression and had the hardware removed from L3-L5.   Patient was offered physical therapy but states he could not afford it so he did not go to this.  Noted to have poor sitting and poor standing tolerance.    After reviewing the outside studies from the patient's spine surgeon I agree with my original opinion that I do not believe the patient's left flank pain and left upper abdominal pain are coming from his spine.  I agree with the PCPs referral to urology and gastroenterology for further work-up.  The patient can follow-up with me as needed.

## 2019-09-24 NOTE — Clinical Note
Dear Ryann Street M.D. , Thank you for the referral of Jamey Riojas.  I do not believe the patient's pain is secondary to a thoracic radicular radiculopathy, musculoskeletal issue, rib pain.  I do not believe he would benefit from injections.  It is unclear what the etiology of the patient's pain is but I do not believe it is neuromusculoskeletal.  I agree with your referrals to urology and GI for further work-up.  Please see my note for more details Should you have any questions or concerns please do not hesitate to contact me. Victoriano Farmer M.D.

## 2019-09-25 ENCOUNTER — TELEPHONE (OUTPATIENT)
Dept: MEDICAL GROUP | Facility: PHYSICIAN GROUP | Age: 59
End: 2019-09-25

## 2019-09-26 NOTE — TELEPHONE ENCOUNTER
Phone Number Called: [phone]    Call outcome: spoke to patient regarding message below    Message: rx for tradjenta sent instead I hope this will be better cost.   Ryann Street M.D.

## 2019-09-26 NOTE — TELEPHONE ENCOUNTER
Dr gao is recommending xrays and a colonoscopy. Patient needs a new referral to GI for colonoscopy. Patient needs another letter for an additional month off. Needs to be faxed to ward .    no

## 2019-10-01 DIAGNOSIS — R10.9 FLANK PAIN: ICD-10-CM

## 2019-10-01 DIAGNOSIS — R10.32 LEFT LOWER QUADRANT PAIN: ICD-10-CM

## 2019-10-01 DIAGNOSIS — R10.84 GENERALIZED ABDOMINAL PAIN: ICD-10-CM

## 2019-10-01 NOTE — TELEPHONE ENCOUNTER
Referral has been placed to GI for colonoscopy--I do not know this patient, he will need an appt for [facility] paperwork extension.

## 2019-10-02 NOTE — TELEPHONE ENCOUNTER
Phone Number Called: [phone]    Call outcome: spoke to patient regarding message below    Message: Referral has been placed to GI for colonoscopy--I do not know this patient, he will need an appt for FMLA paperwork extension.     Pt was informed about referral and appointment was made for FMLA paperwork extension.

## 2019-10-03 ENCOUNTER — APPOINTMENT (OUTPATIENT)
Dept: URGENT CARE | Facility: PHYSICIAN GROUP | Age: 59
End: 2019-10-03
Payer: COMMERCIAL

## 2019-10-03 ENCOUNTER — APPOINTMENT (OUTPATIENT)
Dept: RADIOLOGY | Facility: IMAGING CENTER | Age: 59
End: 2019-10-03
Attending: PHYSICAL MEDICINE & REHABILITATION
Payer: COMMERCIAL

## 2019-10-03 DIAGNOSIS — M25.859 HIP IMPINGEMENT SYNDROME, UNSPECIFIED LATERALITY: ICD-10-CM

## 2019-10-03 DIAGNOSIS — M25.652 DECREASED RANGE OF MOTION OF BOTH HIPS: ICD-10-CM

## 2019-10-03 DIAGNOSIS — M25.651 DECREASED RANGE OF MOTION OF BOTH HIPS: ICD-10-CM

## 2019-10-03 PROCEDURE — 73522 X-RAY EXAM HIPS BI 3-4 VIEWS: CPT | Mod: TC,FY | Performed by: NURSE PRACTITIONER

## 2019-10-10 ENCOUNTER — OFFICE VISIT (OUTPATIENT)
Dept: MEDICAL GROUP | Facility: PHYSICIAN GROUP | Age: 59
End: 2019-10-10
Payer: COMMERCIAL

## 2019-10-10 VITALS
HEART RATE: 87 BPM | OXYGEN SATURATION: 97 % | WEIGHT: 315 LBS | SYSTOLIC BLOOD PRESSURE: 132 MMHG | TEMPERATURE: 97.4 F | DIASTOLIC BLOOD PRESSURE: 80 MMHG | RESPIRATION RATE: 20 BRPM | HEIGHT: 74 IN | BODY MASS INDEX: 40.43 KG/M2

## 2019-10-10 DIAGNOSIS — R10.9 FLANK PAIN: ICD-10-CM

## 2019-10-10 DIAGNOSIS — E66.9 DIABETES MELLITUS TYPE 2 IN OBESE (HCC): ICD-10-CM

## 2019-10-10 DIAGNOSIS — E11.69 DIABETES MELLITUS TYPE 2 IN OBESE (HCC): ICD-10-CM

## 2019-10-10 PROCEDURE — 99213 OFFICE O/P EST LOW 20 MIN: CPT | Performed by: NURSE PRACTITIONER

## 2019-10-10 ASSESSMENT — PAIN SCALES - GENERAL: PAINLEVEL: 7=MODERATE-SEVERE PAIN

## 2019-10-10 NOTE — LETTER
October 10, 2019        RE: Dank Riojas    To whom it may concern:     Dank Riojas was evaluated by me in clinic today.  He has to follow-up with specialist for further evaluation and treatment.  Please excuse him from work until 11/12/2019.     Please contact me with any questions.     Sincerely,              MICHAEL Rodríguez.

## 2019-10-12 NOTE — ASSESSMENT & PLAN NOTE
Patient currently on FMLA leave he is requesting an extension as further work-up is still being conducted, letter has been written, please see scanned document.  This was completed and faxed to [employer].

## 2019-10-12 NOTE — PROGRESS NOTES
Diabetes mellitus type 2 in obese  Patient requesting Tradjenta be called and can, for some reason pharmacy filled as brand-name he needs is called and as generic, this is called in for him today.    Flank pain  Patient currently on FMLA leave he is requesting an extension as further work-up is still being conducted, letter has been written, please see scanned document.  This was completed and faxed to [employer].    Vitals:    10/10/19 1653   BP: 132/80   Pulse: 87   Resp: 20   Temp: 36.3 °C (97.4 °F)   SpO2: 97%       Patient was seen for FMLA paperwork extension for 1 month, 20 minutes face to face of which > 50% of appointment time was spent on counseling and coordination of care regarding the above.

## 2019-10-12 NOTE — ASSESSMENT & PLAN NOTE
Patient requesting Tradjenta be called and can, for some reason pharmacy filled as brand-name he needs is called and as generic, this is called in for him today.

## 2019-10-24 ENCOUNTER — HOSPITAL ENCOUNTER (OUTPATIENT)
Facility: MEDICAL CENTER | Age: 59
End: 2019-10-24
Attending: INTERNAL MEDICINE | Admitting: INTERNAL MEDICINE
Payer: COMMERCIAL

## 2019-11-06 ENCOUNTER — PATIENT MESSAGE (OUTPATIENT)
Dept: MEDICAL GROUP | Facility: PHYSICIAN GROUP | Age: 59
End: 2019-11-06

## 2019-11-11 DIAGNOSIS — Z01.812 PRE-OPERATIVE LABORATORY EXAMINATION: ICD-10-CM

## 2019-11-11 DIAGNOSIS — Z01.810 PRE-OPERATIVE CARDIOVASCULAR EXAMINATION: ICD-10-CM

## 2019-11-11 LAB — EKG IMPRESSION: NORMAL

## 2019-11-11 PROCEDURE — 93010 ELECTROCARDIOGRAM REPORT: CPT | Performed by: INTERNAL MEDICINE

## 2019-11-11 PROCEDURE — 80048 BASIC METABOLIC PNL TOTAL CA: CPT

## 2019-11-11 PROCEDURE — 93005 ELECTROCARDIOGRAM TRACING: CPT

## 2019-11-11 PROCEDURE — 36415 COLL VENOUS BLD VENIPUNCTURE: CPT

## 2019-11-11 RX ORDER — DICYCLOMINE HYDROCHLORIDE 10 MG/1
CAPSULE ORAL
Refills: 2 | COMMUNITY
Start: 2019-10-04 | End: 2020-08-05

## 2019-11-11 RX ORDER — POLYETHYLENE GLYCOL-3350 AND ELECTROLYTES 236; 6.74; 5.86; 2.97; 22.74 G/274.31G; G/274.31G; G/274.31G; G/274.31G; G/274.31G
POWDER, FOR SOLUTION ORAL
Refills: 0 | COMMUNITY
Start: 2019-11-05 | End: 2019-11-21

## 2019-11-12 ENCOUNTER — OFFICE VISIT (OUTPATIENT)
Dept: MEDICAL GROUP | Facility: PHYSICIAN GROUP | Age: 59
End: 2019-11-12
Payer: COMMERCIAL

## 2019-11-12 VITALS
SYSTOLIC BLOOD PRESSURE: 116 MMHG | WEIGHT: 315 LBS | HEART RATE: 73 BPM | DIASTOLIC BLOOD PRESSURE: 68 MMHG | HEIGHT: 74 IN | OXYGEN SATURATION: 94 % | BODY MASS INDEX: 40.43 KG/M2 | TEMPERATURE: 97.9 F | RESPIRATION RATE: 16 BRPM

## 2019-11-12 DIAGNOSIS — R10.9 FLANK PAIN: ICD-10-CM

## 2019-11-12 LAB
ANION GAP SERPL CALC-SCNC: 10 MMOL/L (ref 0–11.9)
BUN SERPL-MCNC: 18 MG/DL (ref 8–22)
CALCIUM SERPL-MCNC: 8.9 MG/DL (ref 8.5–10.5)
CHLORIDE SERPL-SCNC: 102 MMOL/L (ref 96–112)
CO2 SERPL-SCNC: 24 MMOL/L (ref 20–33)
CREAT SERPL-MCNC: 0.8 MG/DL (ref 0.5–1.4)
GLUCOSE SERPL-MCNC: 274 MG/DL (ref 65–99)
POTASSIUM SERPL-SCNC: 4.1 MMOL/L (ref 3.6–5.5)
SODIUM SERPL-SCNC: 136 MMOL/L (ref 135–145)

## 2019-11-12 PROCEDURE — 99214 OFFICE O/P EST MOD 30 MIN: CPT | Performed by: FAMILY MEDICINE

## 2019-11-12 NOTE — PROGRESS NOTES
Subjective:   Jamey Riojas is a 59 y.o. male here today for evaluation and management of:     Flank pain  Patient presents for follow up of chronic left sided flank pain that has been persistently worse since about July 2019.   He is undergoing evaluation currently with a colonoscopy planned by GI as well as a neurology evaluation that was recommended by his pain specialist who determined the pain was not due to a thoracic radiculopathy, rib pain, thoracic pathology or musculoskeletal pathology.   He has tried multiple medications including percocet, gabapentin, muscle relaxants which have not alleviated the pain.   Pain is only improved when he lays on his left side.   CT abd/p was normal.   Referral to neurology done at this visit.   Extension of work done for 3 months.          Current medicines (including changes today)  Current Outpatient Medications   Medication Sig Dispense Refill   • GAVILYTE-G 236 g Recon Soln USE AS DIRECTED ON GI HANDOUT  0   • dicyclomine (BENTYL) 10 MG Cap TAKE 1 CAPSULE BY MOUTH EVERY 6 HOURS AS NEEDED FOR ABDOMINAL CRAMPS AND DISCOMFORT  2   • pioglitazone (ACTOS) 30 MG Tab Take 1 Tab by mouth every day. 90 Tab 3   • zolpidem (AMBIEN) 10 MG Tab      • tamsulosin (FLOMAX) 0.4 MG capsule TAKE 2 CAPSULES BY MOUTH ONCE DAILY 180 Cap 0   • metformin (GLUCOPHAGE) 1000 MG tablet TAKE 1 TABLET BY MOUTH TWICE DAILY WITH MEALS 180 Tab 0   • metoprolol SR (TOPROL XL) 25 MG TABLET SR 24 HR TAKE 1 TABLET BY MOUTH ONCE DAILY 90 Tab 1   • amitriptyline (ELAVIL) 50 MG Tab TAKE 1 TABLET BY MOUTH AT BEDTIME 90 Tab 1   • losartan (COZAAR) 100 MG Tab TAKE 1 TABLET BY MOUTH ONCE DAILY 90 Tab 1   • amLODIPine (NORVASC) 10 MG Tab TAKE 1 TABLET BY MOUTH ONCE DAILY 90 Tab 3   • finasteride (PROSCAR) 5 MG Tab TAKE 1 TABLET BY MOUTH ONCE DAILY 90 Tab 3   • traZODone (DESYREL) 100 MG Tab TAKE 1 TO 2 TABLETS BY MOUTH ONCE DAILY AT BEDTIME IF  NEEDED  FOR  SLEEP 90 Tab 3   • sertraline (ZOLOFT) 50 MG Tab  TAKE 1 TABLET BY MOUTH ONCE DAILY 90 Tab 3   • gabapentin (NEURONTIN) 300 MG Cap TAKE 2 CAPSULES BY MOUTH THREE TIMES DAILY 540 Cap 0   • celecoxib (CELEBREX) 200 MG Cap TAKE 1 CAPSULE BY MOUTH TWICE DAILY 180 Cap 3   • Misc. Devices Misc One touch ultra test strips. Patient has DM type 2 and tests twice per day 100 Each 3   • Misc. Devices Misc One touch ultra lancets; patient has diabetes mellitus type 2 and test once per day 100 Device 3   • tizanidine (ZANAFLEX) 4 MG Tab Take 1 Tab by mouth every 6 hours as needed (back pain). (Patient taking differently: Take 4 mg by mouth every day.) 270 Tab 3   • omeprazole (PRILOSEC) 20 MG delayed-release capsule TAKE ONE CAPSULE BY MOUTH ONCE DAILY 90 Cap 3   • Misc. Devices Misc CPAP supplies; fax to [facility] [phone]. 100 Device 3   • fluticasone (FLONASE) 50 MCG/ACT nasal spray Spray 2 Sprays in nose every day. Each Nostril 1 Bottle 3   • albuterol (VENTOLIN OR PROVENTIL) 108 (90 BASE) MCG/ACT AERS inhalation aerosol Inhale 2 Puffs by mouth every 6 hours as needed for Shortness of Breath. 8.5 g 3   • Multiple Vitamins-Minerals (MULTIVITAMIN PO) Take 1 Tab by mouth every day.       • Fexofenadine HCl (ALLEGRA PO) Take  by mouth as needed.     • Cholecalciferol (VITAMIN D) 2000 UNIT CAPS Take  by mouth every day.     • GLUCOSAMINE-CHONDROITIN-MSM PO Take 2 Tabs by mouth 2 Times a Day.     • aspirin 81 MG tablet Take 81 mg by mouth every day.       No current facility-administered medications for this visit.      He  has a past medical history of Anesthesia, Anxiety, Arthritis, Body aches (11/19/2014), Bronchitis (2009), Chickenpox, Diabetes, Dysuria (7/30/2014), Enlarged liver, GERD (gastroesophageal reflux disease) (12/21/2011), Heart burn, Hypertension, Indigestion, Influenza, Insomnia (12/21/2011), Nephrolithiasis (7/30/2014), Obesity, BRAIN (obstructive sleep apnea), Pain (12-14-12), Psychiatric problem, Sleep apnea, and Snoring.    ROS  No chest pain, no shortness of  "breath, no abdominal pain       Objective:     /68 (BP Location: Left arm, Patient Position: Sitting, BP Cuff Size: Adult)   Pulse 73   Temp 36.6 °C (97.9 °F) (Temporal)   Resp 16   Ht 1.88 m (6' 2\")   Wt (!) 143.3 kg (316 lb)   SpO2 94%  Body mass index is 40.57 kg/m².   Physical Exam:  Constitutional: Alert, no distress.  Skin: Warm, dry, good turgor, no rashes in visible areas.  Eye: Equal, round and reactive, conjunctiva clear, lids normal.  ENMT: Lips without lesions, good dentition, oropharynx clear.  Neck: Trachea midline, no masses, no thyromegaly. No cervical or supraclavicular lymphadenopathy  Respiratory: Unlabored respiratory effort, lungs clear to auscultation, no wheezes, no ronchi.  Cardiovascular: Normal S1, S2, no murmur, no edema.  Abdomen: Soft, non-tender, no masses, no hepatosplenomegaly. Pain TTP over left upper and lower quadrant.   Psych: Alert and oriented x3, normal affect and mood.        Assessment and Plan:   The following treatment plan was discussed    1. Flank pain  Follow up with colonoscopy as scheduled,   Follow up with neurology referral done today.       Followup: Return for follow up as scheduled for DM2 visit 11/21.         "

## 2019-11-12 NOTE — ASSESSMENT & PLAN NOTE
Patient presents for follow up of chronic left sided flank pain that has been persistently worse since about July 2019.   He is undergoing evaluation currently with a colonoscopy planned by GI as well as a neurology evaluation that was recommended by his pain specialist who determined the pain was not due to a thoracic radiculopathy, rib pain, thoracic pathology or musculoskeletal pathology.   He has tried multiple medications including percocet, gabapentin, muscle relaxants which have not alleviated the pain.   Pain is only improved when he lays on his left side.   CT abd/p was normal.   Referral to neurology done at this visit.   Extension of work done for 3 months.

## 2019-11-13 ENCOUNTER — HOSPITAL ENCOUNTER (OUTPATIENT)
Facility: MEDICAL CENTER | Age: 59
End: 2019-11-13
Attending: INTERNAL MEDICINE | Admitting: INTERNAL MEDICINE
Payer: COMMERCIAL

## 2019-11-13 ENCOUNTER — ANESTHESIA EVENT (OUTPATIENT)
Dept: SURGERY | Facility: MEDICAL CENTER | Age: 59
End: 2019-11-13
Payer: COMMERCIAL

## 2019-11-13 ENCOUNTER — ANESTHESIA (OUTPATIENT)
Dept: SURGERY | Facility: MEDICAL CENTER | Age: 59
End: 2019-11-13
Payer: COMMERCIAL

## 2019-11-13 VITALS
OXYGEN SATURATION: 96 % | DIASTOLIC BLOOD PRESSURE: 66 MMHG | SYSTOLIC BLOOD PRESSURE: 149 MMHG | RESPIRATION RATE: 16 BRPM | BODY MASS INDEX: 40.4 KG/M2 | HEIGHT: 74 IN | HEART RATE: 73 BPM | WEIGHT: 314.82 LBS | TEMPERATURE: 97.9 F

## 2019-11-13 PROBLEM — Z86.010 HISTORY OF COLON POLYPS: Status: ACTIVE | Noted: 2019-11-13

## 2019-11-13 LAB — GLUCOSE BLD-MCNC: 262 MG/DL (ref 65–99)

## 2019-11-13 PROCEDURE — 82962 GLUCOSE BLOOD TEST: CPT

## 2019-11-13 PROCEDURE — 160048 HCHG OR STATISTICAL LEVEL 1-5: Performed by: INTERNAL MEDICINE

## 2019-11-13 PROCEDURE — 160002 HCHG RECOVERY MINUTES (STAT): Performed by: INTERNAL MEDICINE

## 2019-11-13 PROCEDURE — 160046 HCHG PACU - 1ST 60 MINS PHASE II: Performed by: INTERNAL MEDICINE

## 2019-11-13 PROCEDURE — 700111 HCHG RX REV CODE 636 W/ 250 OVERRIDE (IP): Performed by: ANESTHESIOLOGY

## 2019-11-13 PROCEDURE — 160009 HCHG ANES TIME/MIN: Performed by: INTERNAL MEDICINE

## 2019-11-13 PROCEDURE — 160204 HCHG ENDO MINUTES - 1ST 30 MINS LEVEL 5: Performed by: INTERNAL MEDICINE

## 2019-11-13 PROCEDURE — 160035 HCHG PACU - 1ST 60 MINS PHASE I: Performed by: INTERNAL MEDICINE

## 2019-11-13 PROCEDURE — 700105 HCHG RX REV CODE 258: Performed by: INTERNAL MEDICINE

## 2019-11-13 PROCEDURE — 160025 RECOVERY II MINUTES (STATS): Performed by: INTERNAL MEDICINE

## 2019-11-13 RX ORDER — HYDRALAZINE HYDROCHLORIDE 20 MG/ML
5 INJECTION INTRAMUSCULAR; INTRAVENOUS
Status: DISCONTINUED | OUTPATIENT
Start: 2019-11-13 | End: 2019-11-13 | Stop reason: HOSPADM

## 2019-11-13 RX ORDER — ONDANSETRON 2 MG/ML
4 INJECTION INTRAMUSCULAR; INTRAVENOUS
Status: DISCONTINUED | OUTPATIENT
Start: 2019-11-13 | End: 2019-11-13 | Stop reason: HOSPADM

## 2019-11-13 RX ORDER — SODIUM CHLORIDE, SODIUM LACTATE, POTASSIUM CHLORIDE, CALCIUM CHLORIDE 600; 310; 30; 20 MG/100ML; MG/100ML; MG/100ML; MG/100ML
INJECTION, SOLUTION INTRAVENOUS CONTINUOUS
Status: DISCONTINUED | OUTPATIENT
Start: 2019-11-13 | End: 2019-11-13 | Stop reason: HOSPADM

## 2019-11-13 RX ORDER — HALOPERIDOL 5 MG/ML
1 INJECTION INTRAMUSCULAR
Status: DISCONTINUED | OUTPATIENT
Start: 2019-11-13 | End: 2019-11-13 | Stop reason: HOSPADM

## 2019-11-13 RX ORDER — DIPHENHYDRAMINE HYDROCHLORIDE 50 MG/ML
12.5 INJECTION INTRAMUSCULAR; INTRAVENOUS
Status: DISCONTINUED | OUTPATIENT
Start: 2019-11-13 | End: 2019-11-13 | Stop reason: HOSPADM

## 2019-11-13 RX ORDER — MEPERIDINE HYDROCHLORIDE 25 MG/ML
12.5 INJECTION INTRAMUSCULAR; INTRAVENOUS; SUBCUTANEOUS
Status: DISCONTINUED | OUTPATIENT
Start: 2019-11-13 | End: 2019-11-13 | Stop reason: HOSPADM

## 2019-11-13 RX ORDER — LABETALOL HYDROCHLORIDE 5 MG/ML
5 INJECTION, SOLUTION INTRAVENOUS
Status: DISCONTINUED | OUTPATIENT
Start: 2019-11-13 | End: 2019-11-13 | Stop reason: HOSPADM

## 2019-11-13 RX ORDER — METOPROLOL TARTRATE 1 MG/ML
1 INJECTION, SOLUTION INTRAVENOUS
Status: DISCONTINUED | OUTPATIENT
Start: 2019-11-13 | End: 2019-11-13 | Stop reason: HOSPADM

## 2019-11-13 RX ADMIN — PROPOFOL 20 MG: 10 INJECTION, EMULSION INTRAVENOUS at 09:08

## 2019-11-13 RX ADMIN — PROPOFOL 40 MG: 10 INJECTION, EMULSION INTRAVENOUS at 09:11

## 2019-11-13 RX ADMIN — SODIUM CHLORIDE, POTASSIUM CHLORIDE, SODIUM LACTATE AND CALCIUM CHLORIDE: 600; 310; 30; 20 INJECTION, SOLUTION INTRAVENOUS at 09:03

## 2019-11-13 RX ADMIN — PROPOFOL 40 MG: 10 INJECTION, EMULSION INTRAVENOUS at 09:22

## 2019-11-13 RX ADMIN — PROPOFOL 30 MG: 10 INJECTION, EMULSION INTRAVENOUS at 09:20

## 2019-11-13 RX ADMIN — PROPOFOL 30 MG: 10 INJECTION, EMULSION INTRAVENOUS at 09:14

## 2019-11-13 RX ADMIN — PROPOFOL 40 MG: 10 INJECTION, EMULSION INTRAVENOUS at 09:18

## 2019-11-13 RX ADMIN — PROPOFOL 20 MG: 10 INJECTION, EMULSION INTRAVENOUS at 09:13

## 2019-11-13 RX ADMIN — PROPOFOL 40 MG: 10 INJECTION, EMULSION INTRAVENOUS at 09:24

## 2019-11-13 RX ADMIN — PROPOFOL 40 MG: 10 INJECTION, EMULSION INTRAVENOUS at 09:09

## 2019-11-13 RX ADMIN — FENTANYL CITRATE 100 MCG: 50 INJECTION, SOLUTION INTRAMUSCULAR; INTRAVENOUS at 09:01

## 2019-11-13 RX ADMIN — PROPOFOL 100 MG: 10 INJECTION, EMULSION INTRAVENOUS at 09:06

## 2019-11-13 RX ADMIN — PROPOFOL 40 MG: 10 INJECTION, EMULSION INTRAVENOUS at 09:16

## 2019-11-13 ASSESSMENT — PAIN SCALES - GENERAL: PAIN_LEVEL: 0

## 2019-11-13 NOTE — ANESTHESIA PREPROCEDURE EVALUATION
Relevant Problems   ANESTHESIA   (+) Obstructive sleep apnea      CARDIAC   (+) HTN (hypertension)      GI   (+) GERD (gastroesophageal reflux disease)         (+) Nephrolithiasis      ENDO   (+) Diabetes mellitus type 2 in obese (HCC)       Physical Exam    Airway   Mallampati: II  TM distance: >3 FB  Neck ROM: full       Cardiovascular - normal exam  Rhythm: regular  Rate: normal  (-) murmur     Dental - normal exam         Pulmonary - normal exam  Breath sounds clear to auscultation     Abdominal   (+) obese     Neurological - normal exam                 Anesthesia Plan    ASA 3   ASA physical status 3 criteria: diabetes - poorly controlled and morbid obesity - BMI greater than or equal to 40    Plan - MAC             Induction: intravenous    Postoperative Plan: Postoperative administration of opioids is intended.    Pertinent diagnostic labs and testing reviewed    Informed Consent:    Anesthetic plan and risks discussed with patient.    Use of blood products discussed with: patient whom consented to blood products.

## 2019-11-13 NOTE — ANESTHESIA TIME REPORT
Anesthesia Start and Stop Event Times     Date Time Event    11/13/2019 0855 Ready for Procedure     0903 Anesthesia Start        Responsible Staff  11/13/19    Name Role Begin End    Alexander Hussein M.D. Anesth 0903         Preop Diagnosis (Free Text):  Pre-op Diagnosis     DIVERTICULOSIS        Preop Diagnosis (Codes):    Post op Diagnosis  Encounter for screening colonoscopy      Premium Reason  Non-Premium    Comments:

## 2019-11-13 NOTE — OR NURSING
0930 To PACU from OR via gurney, side rails up x 2 for safety, lungs clear bilaterally, pt arrives lying on left side and does respond appropriately to RN. Breathing easy and unlabored.   0940 Repositioned patient onto back at request and up on gurney for comfort. Placed warm blanket to right lower back where pt reports chronic pain. Pt denies abdominal pain or nausea. Instructed to DB/C; demonstrated understanding. Pt reports CPAP use at home but reports not bringing CPAP to hospital; encouraged patient to bring CPAP for RN to perform trial post op; pt reports verbal understanding. Abdomen soft non tender; obese.   0955 Pt denies abdominal pain or nausea; tolerating sips of water. Denies any needs at this time. Dr Diaz reviewed results with pt in PACU.   1010 No changes.   1025 No desaturations in PACU on RA. Pt denies pain or nausea.   1030 No desats; denies pain or nausea. Tolerates sips of water. Meets criteria for transfer to stage II.

## 2019-11-13 NOTE — PROCEDURES
DATE OF SERVICE:  11/13/2019    PROCEDURE:  Colonoscopy.    :  Buddy Sanchez MD    INDICATION:  The patient is a 59-year-old gentleman who has a history of   adenomatous colon polyps in 2012 and has had some recent left flank pain.    CONSENT:  Written informed consent was obtained from the patient after   thorough explanation of indications, benefits and risks of the procedure,   which included but was not limited to bleeding, infection, perforation,   adverse reaction to medications and missed lesions.    MEDICATIONS GIVEN:  Monitored anesthesia care with propofol.    Continuous telemetry, BP, pulse oximetry, and capnography were performed by   the anesthesiologist throughout the procedure.    An adult colonoscope was used for the procedure.    FINDINGS:  Patient was placed in the left lateral decubitus position.  After   anesthesia was achieved, a digital rectal exam was performed and found to be   normal.  The endoscope was then inserted into the rectum and advanced to the   cecum, which was identified by the appendiceal orifice and the ileocecal   valve.  It did require abdominal pressure in the sigmoid area to reach the   cecum.  The colonoscope was then withdrawn with careful inspection of mucosa.    He did have moderate sigmoid diverticulosis.  Otherwise, colonic mucosa was   normal.  Retroflexion in the rectum was unremarkable.  The patient underwent a   GoLYTELY prep, the results of which were good.  The patient tolerated the   procedure well.    IMPRESSION:  1.  Sigmoid diverticulosis.  2.  Otherwise, unremarkable colonoscopy.    PLAN:  Resume colonoscopy in 10 years.       ____________________________________     BUDDY SANCHEZ MD    CODEY / NTS    DD:  11/13/2019 09:32:46  DT:  11/13/2019 12:03:00    D#:  [id number]  Job#:  [id number]

## 2019-11-13 NOTE — OR SURGEON
Immediate Post OP Note    PreOp Diagnosis: History of colon polyps    PostOp Diagnosis: Diverticulosis    Procedure(s):  COLONOSCOPY - Wound Class: Clean Contaminated    Surgeon(s):  Buddy Diaz M.D.    Anesthesiologist/Type of Anesthesia:  Anesthesiologist: Alexander Hussein M.D./SYLVIA    Surgical Staff:  Circulator: Roslyn Eldridge R.N.; Iam Mujica R.N.  Endoscopy Technician: Reina Villa    Specimens removed if any:  None    Estimated Blood Loss: None    Findings:   1. Moderate sigmoid diverticulosis    Complications: None        11/13/2019 9:29 AM Buddy Diaz M.D.

## 2019-11-13 NOTE — ANESTHESIA POSTPROCEDURE EVALUATION
Patient: Jamey Riojas    Procedure Summary     Date:  11/13/19 Room / Location:   ENDOSCOPIC ULTRASOUND ROOM / SURGERY [facility]    Anesthesia Start:  0903 Anesthesia Stop:  0933    Procedure:  COLONOSCOPY Diagnosis:  (DIVERTICULOSIS)    Surgeon:  Buddy Diaz M.D. Responsible Provider:  Alexander Hussein M.D.    Anesthesia Type:  MAC ASA Status:  3          Final Anesthesia Type: MAC  Last vitals  BP   Blood Pressure: 149/66    Temp   36.6 °C (97.9 °F)    Pulse   Pulse: 73   Resp   16    SpO2   96 %      Anesthesia Post Evaluation    Patient location during evaluation: PACU  Patient participation: complete - patient participated  Level of consciousness: awake and alert  Pain score: 0    Airway patency: patent  Anesthetic complications: no  Cardiovascular status: hemodynamically stable  Respiratory status: acceptable  Hydration status: euvolemic    PONV: none           Nurse Pain Score: 0 (NPRS)

## 2019-11-13 NOTE — DISCHARGE INSTRUCTIONS
ENDOSCOPY HOME CARE INSTRUCTIONS    COLONOSCOPY OR FLEXIBLE SIGMOIDOSCOPY  1. If you received a barium enema, take a mild laxative such as dulcolax, Beryl's M.O., or Milk of Magnesia to clean out the barium.  2. Drink plenty of fluids. Eat a diet high in fiber (whole-grain breads, fresh fruit and vegetables).  3. You may notice a few drops of blood with your first bowel movement. If you develop any large amount of bleeding, black stools, a fever, or abdominal pain, call your doctor right away.  4. Call your doctor for test results in 2 weeks.  5. Don't drive or drink alcohol for 24 hours. The medication you received will make you too drowsy.  6. No heavy lifting, no Aspirin products or Aspirin for 5 days   7. See your doctor as needed  8. Additional instructions: Repeat colonoscopy in 10 years.  9. Prescriptions: none       Dr Diaz  [phone]  You should call 911 if you develop problems with breathing or chest pain.  If any questions arise, call your doctor. If your doctor is not available, please feel free to call [phone]. You can also call the HEALTH HOTLINE open 24 hours/day, 7 days/week and speak to a nurse at [phone], or toll free [phone].    Depression / Suicide Risk    As you are discharged from this [facility] facility, it is important to learn how to keep safe from harming yourself.    Recognize the warning signs:  · Abrupt changes in personality, positive or negative- including increase in energy   · Giving away possessions  · Change in eating patterns- significant weight changes-  positive or negative  · Change in sleeping patterns- unable to sleep or sleeping all the time   · Unwillingness or inability to communicate  · Depression  · Unusual sadness, discouragement and loneliness  · Talk of wanting to die  · Neglect of personal appearance   · Rebelliousness- reckless behavior  · Withdrawal from people/activities they love  · Confusion- inability to concentrate     If you or  a loved one observes any of these behaviors or has concerns about self-harm, here's what you can do:  · Talk about it- your feelings and reasons for harming yourself  · Remove any means that you might use to hurt yourself (examples: pills, rope, extension cords, firearm)  · Get professional help from the community (Mental Health, Substance Abuse, psychological counseling)  · Do not be alone:Call your Safe Contact- someone whom you trust who will be there for you.  · Call your local CRISIS HOTLINE 655-6697 or 473-557-2295  · Call your local Children's Mobile Crisis Response Team [place] (374) 859-8232 or www.Amazon  · Call the toll free National Suicide Prevention Hotlines   · National Suicide Prevention Lifeline 339-757-AMCN (7876)  · National Hope Line Network 800-SUICIDE (251-3129)    I acknowledge receipt and understanding of these Home Care Instructions.ENDOSCOPY HOME CARE INSTRUCTIONS

## 2019-11-14 ENCOUNTER — HOSPITAL ENCOUNTER (OUTPATIENT)
Dept: LAB | Facility: MEDICAL CENTER | Age: 59
End: 2019-11-14
Attending: FAMILY MEDICINE
Payer: COMMERCIAL

## 2019-11-14 DIAGNOSIS — R10.32 LEFT LOWER QUADRANT PAIN: ICD-10-CM

## 2019-11-14 DIAGNOSIS — E78.5 DYSLIPIDEMIA: ICD-10-CM

## 2019-11-14 DIAGNOSIS — E66.9 DIABETES MELLITUS TYPE 2 IN OBESE (HCC): ICD-10-CM

## 2019-11-14 DIAGNOSIS — E11.69 DIABETES MELLITUS TYPE 2 IN OBESE (HCC): ICD-10-CM

## 2019-11-14 LAB
ALBUMIN SERPL BCP-MCNC: 4 G/DL (ref 3.2–4.9)
ALBUMIN/GLOB SERPL: 1.5 G/DL
ALP SERPL-CCNC: 118 U/L (ref 30–99)
ALT SERPL-CCNC: 28 U/L (ref 2–50)
ANION GAP SERPL CALC-SCNC: 11 MMOL/L (ref 0–11.9)
AST SERPL-CCNC: 24 U/L (ref 12–45)
BILIRUB SERPL-MCNC: 1.3 MG/DL (ref 0.1–1.5)
BUN SERPL-MCNC: 12 MG/DL (ref 8–22)
CALCIUM SERPL-MCNC: 8.9 MG/DL (ref 8.5–10.5)
CHLORIDE SERPL-SCNC: 103 MMOL/L (ref 96–112)
CHOLEST SERPL-MCNC: 141 MG/DL (ref 100–199)
CO2 SERPL-SCNC: 24 MMOL/L (ref 20–33)
CREAT SERPL-MCNC: 0.82 MG/DL (ref 0.5–1.4)
EST. AVERAGE GLUCOSE BLD GHB EST-MCNC: 237 MG/DL
FASTING STATUS PATIENT QL REPORTED: NORMAL
GLOBULIN SER CALC-MCNC: 2.6 G/DL (ref 1.9–3.5)
GLUCOSE SERPL-MCNC: 254 MG/DL (ref 65–99)
HBA1C MFR BLD: 9.9 % (ref 0–5.6)
HDLC SERPL-MCNC: 36 MG/DL
LDLC SERPL CALC-MCNC: 61 MG/DL
POTASSIUM SERPL-SCNC: 3.7 MMOL/L (ref 3.6–5.5)
PROT SERPL-MCNC: 6.6 G/DL (ref 6–8.2)
SODIUM SERPL-SCNC: 138 MMOL/L (ref 135–145)
TRIGL SERPL-MCNC: 218 MG/DL (ref 0–149)

## 2019-11-14 PROCEDURE — 80053 COMPREHEN METABOLIC PANEL: CPT

## 2019-11-14 PROCEDURE — 80061 LIPID PANEL: CPT

## 2019-11-14 PROCEDURE — 36415 COLL VENOUS BLD VENIPUNCTURE: CPT

## 2019-11-14 PROCEDURE — 83036 HEMOGLOBIN GLYCOSYLATED A1C: CPT

## 2019-11-20 NOTE — RESULT ENCOUNTER NOTE
Jamey,  Your LDL cholesterol has improved! A1c got worse, kidney function looks great!  Ryann Street M.D.

## 2019-11-21 ENCOUNTER — OFFICE VISIT (OUTPATIENT)
Dept: MEDICAL GROUP | Facility: PHYSICIAN GROUP | Age: 59
End: 2019-11-21
Payer: COMMERCIAL

## 2019-11-21 VITALS
HEART RATE: 102 BPM | SYSTOLIC BLOOD PRESSURE: 136 MMHG | BODY MASS INDEX: 40.17 KG/M2 | TEMPERATURE: 98.5 F | DIASTOLIC BLOOD PRESSURE: 76 MMHG | WEIGHT: 313 LBS | HEIGHT: 74 IN | RESPIRATION RATE: 16 BRPM | OXYGEN SATURATION: 95 %

## 2019-11-21 DIAGNOSIS — E66.9 DIABETES MELLITUS TYPE 2 IN OBESE (HCC): ICD-10-CM

## 2019-11-21 DIAGNOSIS — R10.9 FLANK PAIN: ICD-10-CM

## 2019-11-21 DIAGNOSIS — F51.01 PRIMARY INSOMNIA: ICD-10-CM

## 2019-11-21 DIAGNOSIS — E11.69 DIABETES MELLITUS TYPE 2 IN OBESE (HCC): ICD-10-CM

## 2019-11-21 DIAGNOSIS — E66.01 MORBID OBESITY WITH BMI OF 40.0-44.9, ADULT (HCC): ICD-10-CM

## 2019-11-21 PROCEDURE — 99214 OFFICE O/P EST MOD 30 MIN: CPT | Performed by: FAMILY MEDICINE

## 2019-11-21 RX ORDER — TRAZODONE HYDROCHLORIDE 100 MG/1
TABLET ORAL
Qty: 180 TAB | Refills: 3 | Status: SHIPPED | OUTPATIENT
Start: 2019-11-21 | End: 2020-12-15

## 2019-11-21 RX ORDER — TRAZODONE HYDROCHLORIDE 100 MG/1
TABLET ORAL
Qty: 90 TAB | Refills: 3 | Status: SHIPPED | OUTPATIENT
Start: 2019-11-21 | End: 2019-11-21

## 2019-11-21 NOTE — LETTER
November 21, 2019    To whom it may concern:    Jamey Riojas is my patient in clinic, he has been undergoing evaluation by many specialists including gastroenterology and neurology and pain specialists for the persistent severe left flank pain.     Due to the severity of the pain he has been unable to work since the beginning of August 2019. I first evaluated him for this August 15th. The pain had been present before this but had severely worsened the first week of August to the extent he has been unable to work due to the pain. He is in severe pain even with sitting, standing and walking.     The pain has not lessened and is persistent till today and ongoing. Please ensure that he is excused from work from the first week of August till February 12th 2020 since ongoing evaluation and treatment of the pain is still being done.     Please contact me with any questions,             Ryann Street M.D.

## 2019-11-21 NOTE — PROGRESS NOTES
Subjective:   Carol Riojas is a 59 y.o. male here today for evaluation and management of:     Diabetes mellitus type 2 in obese  Worsening  Results for CAROL RIOJAS (MRN [id number]) as of 11/21/2019 13:05   Ref. Range 7/26/2018 13:25 11/8/2018 09:03 5/2/2019 10:20 8/16/2019 11:01 11/14/2019 09:05   Glycohemoglobin Latest Ref Range: 0.0 - 5.6 % 7.2 7.9 (H) 8.0 (H) 8.3 (H) 9.9 (H)   Current medications: metformin 1000bid, actos 30 mg daily.   Patient is morbidly obese and has been working on gradual weight loss.   Normal GFR and microalbumin and LDL is 61  Takes ASA 81 mg and losartan  Is not on a statin  He does not smoke.   Advised him to continue annual retinal screening:  DM monofilament testing today: decreased sensation more on left, also decreased on the right.   Referral to endocrinology done.       Flank pain  Carol has been undergoing evaluation by many specialists including gastroenterology and neurology and pain specialists for the persistent severe left flank pain. He recently had a clonoscopy which was normal, his pain specialist determined pain was not due to rib pain or thoracic radiculopathy and recommended he have evaluation done by neurology and he has an appointment for this December 3rd. .  CT abd/p did not reveal an etiology for the pain.     He has tried multiple medications percocet, gabapentin, muscle relaxants which do not help with the pain.     Pain is only improved when he lays on his left side.     Due to the severity of the pain he has been unable to work since the beginning of August 2019. I first evaluated him for this August 15th. The pain had been present before this but had severely worsened the first week of August to the extent he has been unable to work due to the pain. He is in severe pain even with sitting, standing and walking.     The pain has not lessened and is persistent till today and ongoing. Please ensure that he is excused from work from the first  week of August till February 12th 2020 since ongoing evaluation and treatment of the pain is still being done.     Patient had worked very hard to stop narcotic pain medication in the past, but current pain is so severe and unrelenting that he is even willing to go back on some narcotic pain medication as nothing else has provided relief.     He'll discuss pain medication with Dr. Farmer and if another referral to other pain management for rx for medication is needed I can do that urgently for him.            Current medicines (including changes today)  Current Outpatient Medications   Medication Sig Dispense Refill   • Insulin Degludec (TRESIBA FLEXTOUCH) 200 UNIT/ML Solution Pen-injector Inject 10 Units as instructed every bedtime for 30 days. Increase by 2 units every 3 days till goal of  fasting blood sugar is reached 5 PEN 11   • traZODone (DESYREL) 100 MG Tab TAKE 1 TO 2 TABLETS BY MOUTH ONCE DAILY AT BEDTIME IF  NEEDED  FOR  SLEEP 90 Tab 3   • tamsulosin (FLOMAX) 0.4 MG capsule TAKE 2 CAPSULES BY MOUTH ONCE DAILY 180 Cap 1   • metformin (GLUCOPHAGE) 1000 MG tablet TAKE 1 TABLET BY MOUTH TWICE DAILY WITH MEALS 180 Tab 1   • zolpidem (AMBIEN) 10 MG Tab TAKE 1 TABLET BY MOUTH AT BEDTIME AS NEEDED FOR SLEEP FOR  UP  TO  30  DAYS 30 Tab 4   • dicyclomine (BENTYL) 10 MG Cap TAKE 1 CAPSULE BY MOUTH EVERY 6 HOURS AS NEEDED FOR ABDOMINAL CRAMPS AND DISCOMFORT  2   • pioglitazone (ACTOS) 30 MG Tab Take 1 Tab by mouth every day. 90 Tab 3   • metoprolol SR (TOPROL XL) 25 MG TABLET SR 24 HR TAKE 1 TABLET BY MOUTH ONCE DAILY 90 Tab 1   • amitriptyline (ELAVIL) 50 MG Tab TAKE 1 TABLET BY MOUTH AT BEDTIME 90 Tab 1   • losartan (COZAAR) 100 MG Tab TAKE 1 TABLET BY MOUTH ONCE DAILY 90 Tab 1   • amLODIPine (NORVASC) 10 MG Tab TAKE 1 TABLET BY MOUTH ONCE DAILY 90 Tab 3   • finasteride (PROSCAR) 5 MG Tab TAKE 1 TABLET BY MOUTH ONCE DAILY 90 Tab 3   • sertraline (ZOLOFT) 50 MG Tab TAKE 1 TABLET BY MOUTH ONCE DAILY 90 Tab 3    • gabapentin (NEURONTIN) 300 MG Cap TAKE 2 CAPSULES BY MOUTH THREE TIMES DAILY 540 Cap 0   • celecoxib (CELEBREX) 200 MG Cap TAKE 1 CAPSULE BY MOUTH TWICE DAILY 180 Cap 3   • Misc. Devices Misc One touch ultra test strips. Patient has DM type 2 and tests twice per day 100 Each 3   • Misc. Devices Misc One touch ultra lancets; patient has diabetes mellitus type 2 and test once per day 100 Device 3   • tizanidine (ZANAFLEX) 4 MG Tab Take 1 Tab by mouth every 6 hours as needed (back pain). (Patient taking differently: Take 4 mg by mouth every day.) 270 Tab 3   • omeprazole (PRILOSEC) 20 MG delayed-release capsule TAKE ONE CAPSULE BY MOUTH ONCE DAILY 90 Cap 3   • fluticasone (FLONASE) 50 MCG/ACT nasal spray Spray 2 Sprays in nose every day. Each Nostril 1 Bottle 3   • albuterol (VENTOLIN OR PROVENTIL) 108 (90 BASE) MCG/ACT AERS inhalation aerosol Inhale 2 Puffs by mouth every 6 hours as needed for Shortness of Breath. 8.5 g 3   • Multiple Vitamins-Minerals (MULTIVITAMIN PO) Take 1 Tab by mouth every day.       • Fexofenadine HCl (ALLEGRA PO) Take  by mouth as needed.     • Cholecalciferol (VITAMIN D) 2000 UNIT CAPS Take  by mouth every day.     • GLUCOSAMINE-CHONDROITIN-MSM PO Take 2 Tabs by mouth 2 Times a Day.     • aspirin 81 MG tablet Take 81 mg by mouth every day.     • Misc. Devices Misc CPAP supplies; fax to [facility] [phone]. 100 Device 3     No current facility-administered medications for this visit.      He  has a past medical history of Anesthesia, Anxiety, Arthritis, Body aches (11/19/2014), Bronchitis (2009), Chickenpox, Diabetes, Dysuria (7/30/2014), Enlarged liver, GERD (gastroesophageal reflux disease) (12/21/2011), Heart burn, Hypertension, Indigestion, Influenza, Insomnia (12/21/2011), Nephrolithiasis (7/30/2014), Obesity, BRAIN (obstructive sleep apnea), Pain (12-14-12), Psychiatric problem, Sleep apnea, and Snoring.    ROS  No chest pain, no shortness of breath, no abdominal pain       Objective:  "    /76 (BP Location: Left arm, Patient Position: Sitting, BP Cuff Size: Large adult)   Pulse (!) 102   Temp 36.9 °C (98.5 °F) (Temporal)   Resp 16   Ht 1.88 m (6' 2\")   Wt (!) 142 kg (313 lb)   SpO2 95%  Body mass index is 40.19 kg/m².   Physical Exam: morbidly obese  Constitutional: Alert, visibly in discomfort even when just seated.   Skin: Warm, dry, good turgor, no rashes in visible areas.  Eye: Equal, round and reactive, conjunctiva clear, lids normal.  ENMT: Lips without lesions, good dentition, oropharynx clear.  Neck: Trachea midline, no masses, no thyromegaly. No cervical or supraclavicular lymphadenopathy  Respiratory: Unlabored respiratory effort, lungs clear to auscultation, no wheezes, no ronchi.  Cardiovascular: Normal S1, S2, no murmur, no edema.  Abdomen: Soft, TTP right flank from ribs to right hip. no masses, no hepatosplenomegaly.  Psych: Alert and oriented x3, normal affect and mood.        Assessment and Plan:   The following treatment plan was discussed    1. Diabetes mellitus type 2 in obese (HCC)  Start tresiba  - Insulin Degludec (TRESIBA FLEXTOUCH) 200 UNIT/ML Solution Pen-injector; Inject 10 Units as instructed every bedtime for 30 days. Increase by 2 units every 3 days till goal of  fasting blood sugar is reached  Dispense: 5 PEN; Refill: 11  - HEMOGLOBIN A1C; Future    2. Flank pain  Unchanged, uncontrolled.   Follow up with neurology and pain management.     3. Primary insomnia  - traZODone (DESYREL) 100 MG Tab; TAKE 1 TO 2 TABLETS BY MOUTH ONCE DAILY AT BEDTIME IF  NEEDED  FOR  SLEEP  Dispense: 90 Tab; Refill: 3      Followup: Return in about 3 months (around 2/21/2020) for flank pain, DM, insomnia, obesity.         "

## 2019-11-21 NOTE — ASSESSMENT & PLAN NOTE
Jamey has been undergoing evaluation by many specialists including gastroenterology and neurology and pain specialists for the persistent severe left flank pain. He recently had a clonoscopy which was normal, his pain specialist determined pain was not due to rib pain or thoracic radiculopathy and recommended he have evaluation done by neurology and he has an appointment for this December 3rd. .  CT abd/p did not reveal an etiology for the pain.     He has tried multiple medications percocet, gabapentin, muscle relaxants which do not help with the pain.     Pain is only improved when he lays on his left side.     Due to the severity of the pain he has been unable to work since the beginning of August 2019. I first evaluated him for this August 15th. The pain had been present before this but had severely worsened the first week of August to the extent he has been unable to work due to the pain. He is in severe pain even with sitting, standing and walking.     The pain has not lessened and is persistent till today and ongoing. Please ensure that he is excused from work from the first week of August till February 12th 2020 since ongoing evaluation and treatment of the pain is still being done.     Patient had worked very hard to stop narcotic pain medication in the past, but current pain is so severe and unrelenting that he is even willing to go back on some narcotic pain medication as nothing else has provided relief.     He'll discuss pain medication with Dr. Farmer and if another referral to other pain management for rx for medication is needed I can do that urgently for him.

## 2019-11-21 NOTE — ASSESSMENT & PLAN NOTE
Worsening  Results for CAROL SARAVIA (MRN [id number]) as of 11/21/2019 13:05   Ref. Range 7/26/2018 13:25 11/8/2018 09:03 5/2/2019 10:20 8/16/2019 11:01 11/14/2019 09:05   Glycohemoglobin Latest Ref Range: 0.0 - 5.6 % 7.2 7.9 (H) 8.0 (H) 8.3 (H) 9.9 (H)   Current medications: metformin 1000bid, actos 30 mg daily.   Patient is morbidly obese and has been working on gradual weight loss.   Normal GFR and microalbumin and LDL is 61  Takes ASA 81 mg and losartan  Is not on a statin  He does not smoke.   Advised him to continue annual retinal screening:  DM monofilament testing today: decreased sensation more on left, also decreased on the right.   Referral to endocrinology done.

## 2019-12-16 RX ORDER — AMITRIPTYLINE HYDROCHLORIDE 50 MG/1
TABLET, FILM COATED ORAL
Qty: 90 TAB | Refills: 0 | Status: SHIPPED | OUTPATIENT
Start: 2019-12-16 | End: 2020-03-30

## 2019-12-20 RX ORDER — METOPROLOL SUCCINATE 25 MG/1
TABLET, EXTENDED RELEASE ORAL
Qty: 90 TAB | Refills: 0 | Status: SHIPPED | OUTPATIENT
Start: 2019-12-20 | End: 2020-03-30

## 2019-12-20 RX ORDER — LOSARTAN POTASSIUM 100 MG/1
TABLET ORAL
Qty: 90 EACH | Refills: 0 | Status: SHIPPED | OUTPATIENT
Start: 2019-12-20 | End: 2020-03-30

## 2019-12-20 NOTE — TELEPHONE ENCOUNTER
Was the patient seen in the last year in this department? Yes    Does patient have an active prescription for medications requested? No     Received Request Via: Pharmacy      Pt met protocol?: Yes, ov 11/19 bp 136/76

## 2019-12-27 DIAGNOSIS — M48.062 LUMBAR STENOSIS WITH NEUROGENIC CLAUDICATION: ICD-10-CM

## 2019-12-30 ENCOUNTER — PATIENT MESSAGE (OUTPATIENT)
Dept: MEDICAL GROUP | Facility: PHYSICIAN GROUP | Age: 59
End: 2019-12-30

## 2019-12-30 ENCOUNTER — TELEPHONE (OUTPATIENT)
Dept: SLEEP MEDICINE | Facility: MEDICAL CENTER | Age: 59
End: 2019-12-30

## 2019-12-30 RX ORDER — CELECOXIB 200 MG/1
CAPSULE ORAL
Qty: 180 CAP | Refills: 0 | Status: SHIPPED | OUTPATIENT
Start: 2019-12-30 | End: 2020-05-07

## 2019-12-30 NOTE — TELEPHONE ENCOUNTER
Was the patient seen in the last year in this department? Yes    Does patient have an active prescription for medications requested? No     Received Request Via: Patient      Pt met protocol?: Yes, OV 11/19

## 2019-12-30 NOTE — TELEPHONE ENCOUNTER
Insurance will not pay for anything without an OV within the last 12 months, also it is policy with our office that they be current with their appts for new orders.     [facility] follows medicare guidelines in this case so pt will need to be seen first.

## 2019-12-30 NOTE — TELEPHONE ENCOUNTER
Per pt's wife his mask broke and he can no longer use it.  He cannot get new mask because his order has .  He was last seen 05/10/2018 and his next appt is 2020.  Can an order for his mask only be sent to dme? He wants the same mask. He is a patient of [facility].

## 2019-12-31 NOTE — TELEPHONE ENCOUNTER
Informed pt, he stated his mask will make it until his next appt. We will renew supplies at that time with DME:  [facility] /  [phone] / fax [phone]

## 2020-01-31 ENCOUNTER — TELEPHONE (OUTPATIENT)
Dept: MEDICAL GROUP | Facility: PHYSICIAN GROUP | Age: 60
End: 2020-01-31

## 2020-01-31 NOTE — TELEPHONE ENCOUNTER
Pt's wife is calling requesting if Lisa could please give Pt a call regarding la paperwork and other info. Pt can be reached at [phone]. I did let pt know that MA was in and out out with Pt's all day and will see this note if she gets a free moment.  Thank you so much

## 2020-02-05 ENCOUNTER — OFFICE VISIT (OUTPATIENT)
Dept: MEDICAL GROUP | Facility: PHYSICIAN GROUP | Age: 60
End: 2020-02-05
Payer: COMMERCIAL

## 2020-02-05 VITALS
SYSTOLIC BLOOD PRESSURE: 126 MMHG | OXYGEN SATURATION: 97 % | HEART RATE: 77 BPM | HEIGHT: 74 IN | DIASTOLIC BLOOD PRESSURE: 70 MMHG | WEIGHT: 315 LBS | TEMPERATURE: 98.3 F | RESPIRATION RATE: 16 BRPM | BODY MASS INDEX: 40.43 KG/M2

## 2020-02-05 DIAGNOSIS — E11.69 DIABETES MELLITUS TYPE 2 IN OBESE: ICD-10-CM

## 2020-02-05 DIAGNOSIS — E66.9 DIABETES MELLITUS TYPE 2 IN OBESE: ICD-10-CM

## 2020-02-05 DIAGNOSIS — R10.9 FLANK PAIN: ICD-10-CM

## 2020-02-05 PROCEDURE — 99214 OFFICE O/P EST MOD 30 MIN: CPT | Performed by: FAMILY MEDICINE

## 2020-02-05 RX ORDER — INSULIN DEGLUDEC 200 U/ML
28 INJECTION, SOLUTION SUBCUTANEOUS EVERY EVENING
COMMUNITY
Start: 2019-11-21 | End: 2020-02-21

## 2020-02-05 NOTE — LETTER
February 5, 2020    To whom it may concern:     Jamey Riojas is my patient in clinic, he has has been undergoing evaluation by many specialists including gastroenterology and neurology and pain specialists.     He has tried multiple medications percocet, gabapentin, muscle relaxants which do not help with the pain. He is going to have nerve studies done and then procedures like nerve ablations or epidurals to help relieve the pain.      Due to the severity of the pain he has been unable to work since the beginning of August 2019. I first evaluated him for this August 15th. The pain had been present before this but had severely worsened the first week of August to the extent he has been unable to work due to the pain. He is in severe pain even with sitting, standing and walking.      The pain has not lessened and is persistent till today and ongoing. Please ensure he is excused from work from the first week of August 2019 till May 1st 2020.     Please contact me with any questions.     Thank you,             Ryann Street M.D.

## 2020-02-06 ENCOUNTER — SLEEP CENTER VISIT (OUTPATIENT)
Dept: SLEEP MEDICINE | Facility: MEDICAL CENTER | Age: 60
End: 2020-02-06
Payer: COMMERCIAL

## 2020-02-06 VITALS
DIASTOLIC BLOOD PRESSURE: 70 MMHG | OXYGEN SATURATION: 95 % | WEIGHT: 315 LBS | BODY MASS INDEX: 40.43 KG/M2 | HEART RATE: 79 BPM | SYSTOLIC BLOOD PRESSURE: 122 MMHG | HEIGHT: 74 IN

## 2020-02-06 DIAGNOSIS — E66.01 MORBID OBESITY WITH BMI OF 40.0-44.9, ADULT (HCC): ICD-10-CM

## 2020-02-06 DIAGNOSIS — G47.33 OSA (OBSTRUCTIVE SLEEP APNEA): ICD-10-CM

## 2020-02-06 PROCEDURE — 99214 OFFICE O/P EST MOD 30 MIN: CPT | Performed by: NURSE PRACTITIONER

## 2020-02-06 NOTE — PROGRESS NOTES
CC:  Here for f/u sleep issues as listed below    HPI:   Jamey presents today for follow up obstructive sleep apnea.    Past medical history of chronic pain, depression, iron deficiency anemia, BPH, sinusitis, lumbar stenosis, chronic use of opioids, dyslipidemia, type 2 diabetes, hypertension, insomnia, GERD, vitamin D deficiency.    PSG from 2012 indicated an AHI of 36 and low oxygenation of 83%. Machine is 1 year old.  Currently he is being treated with autoCPAP @ [id number].  Compliance download from the dates 1/7/2020 - 2/5/2020 indicates he is wearing the device 100% for an avg of 8 hours and 2 minutes per night with a reduced AHI of 1.2.  Avg pressure is 13.4 to a max of 15.6.       He does tolerate pressure and mask well.  He wakes up refreshed and is less tired throughout the day. They deny morning H/A. He sleeps better overall. He will continue to clean supplies weekly and change them as insurance allows.  He is in the process of workmans comp and disability. Requiring walker and need to establish with neurology. BMI is 41. Trying to lose weight and eating less.                Patient Active Problem List    Diagnosis Date Noted   • Degeneration of cervical intervertebral disc 11/30/2012     Priority: High   • Obstructive sleep apnea 12/02/2012     Priority: Medium   • Iron deficiency anemia 12/02/2012     Priority: Medium   • HTN (hypertension) 12/21/2011     Priority: Medium   • Major depression 12/02/2012     Priority: Low   • Vitamin D deficiency disease 01/12/2012     Priority: Low   • History of colon polyps 11/13/2019   • Flank pain 09/12/2019   • Left lower quadrant pain 08/15/2019   • Dyslipidemia 07/26/2018   • Morbid obesity with BMI of 40.0-44.9, adult (HCC) 05/10/2018   • H/O colonoscopy with polypectomy 08/17/2017   • Chronic use of opiate drug for therapeutic purpose 08/11/2016   • Lumbar stenosis with neurogenic claudication 12/31/2015   • Anemia of unknown etiology 05/21/2015   •  Sinusitis 04/21/2015   • Body aches 11/19/2014   • Nephrolithiasis 07/30/2014   • BPH (benign prostatic hyperplasia) 12/20/2013   • Degeneration of lumbar or lumbosacral intervertebral disc 12/30/2012   • Chronic pain of left knee 09/18/2012   • Diabetes mellitus type 2 in obese (HCC) 12/21/2011   • Insomnia 12/21/2011   • GERD (gastroesophageal reflux disease) 12/21/2011   • Chronic back pain 12/21/2011       Past Medical History:   Diagnosis Date   • Anesthesia     mother has hard time waking up   • Anxiety    • Arthritis     knees and shoulder joints and hands   • Body aches 11/19/2014   • Bronchitis 2009   • Chickenpox    • Diabetes     oral medication and diet   • Dysuria 7/30/2014   • Enlarged liver    • GERD (gastroesophageal reflux disease) 12/21/2011   • Heart burn    • Hypertension    • Indigestion    • Influenza    • Insomnia 12/21/2011   • Nephrolithiasis 7/30/2014    This is a recurring problem. Patient states that he was evaluated for a kidney stone 2012. CT scan showed a stone that was not obstructing at that time. He has been having increasing right-sided back pain for approximately the last month. He has not had any fever or chills.   • Obesity    • BRAIN (obstructive sleep apnea)    • Pain 12-14-12    lower back,neck, 5/10   • Psychiatric problem     depression   • Sleep apnea     CPAP, O2 @ HS 2.5 liters   • Snoring        Past Surgical History:   Procedure Laterality Date   • COLONOSCOPY  11/13/2019    Procedure: COLONOSCOPY;  Surgeon: Buddy Diaz M.D.;  Location: SURGERY [facility];  Service: Gastroenterology   • LUMBAR FUSION POSTERIOR  12/30/2012    Performed by Mohsen Chan M.D. at SURGERY [facility]   • CERVICAL DISK AND FUSION ANTERIOR  11/30/2012    Performed by Mohsen Chan M.D. at [facility]   • HARDWARE REMOVAL ORTHO  11/30/2012    Performed by Mohsen Chan M.D. at [facility]   • KNEE ARTHROSCOPY  9/18/2012    Performed by James BARRIENTOS  TANIA Long at SURGERY [facility]   • MEDIAL MENISCECTOMY  9/18/2012    Performed by James Long M.D. at SURGERY [facility]   • DEBRIDEMENT  9/18/2012    Performed by James Long M.D. at SURGERY [facility]   • LUMBAR FUSION POSTERIOR  6/18/2010    Performed by BENJAMIN KUMAR at SURGERY [facility]   • LUMBAR DECOMPRESSION  6/18/2010    Performed by BENJAMIN KUMAR at SURGERY [facility]   • KNEE ARTHROSCOPY  10/20/2009    Performed by JAMES LONG at SURGERY [facility]   • MEDIAL MENISCECTOMY  10/20/2009    Performed by JAMES LONG at SURGERY [facility]   • CERVICAL FUSION POSTERIOR  9/2007    c -3-4-5    • CHOLECYSTECTOMY  1984    open   • FOOT SURGERY      left  Farrar's neuroma and heel spur       Family History   Problem Relation Age of Onset   • Other Mother         osteopenia   • Hypertension Mother    • Psychiatric Illness Mother    • Other Father         brain tumor   • Heart Attack Maternal Grandfather    • Sleep Apnea Neg Hx        Social History     Socioeconomic History   • Marital status:      Spouse name: Not on file   • Number of children: Not on file   • Years of education: Not on file   • Highest education level: Not on file   Occupational History   • Not on file   Social Needs   • Financial resource strain: Not on file   • Food insecurity:     Worry: Not on file     Inability: Not on file   • Transportation needs:     Medical: Not on file     Non-medical: Not on file   Tobacco Use   • Smoking status: Never Smoker   • Smokeless tobacco: Never Used   Substance and Sexual Activity   • Alcohol use: No     Alcohol/week: 0.0 oz   • Drug use: No   • Sexual activity: Yes     Partners: Female   Lifestyle   • Physical activity:     Days per week: Not on file     Minutes per session: Not on file   • Stress: Not on file   Relationships   • Social connections:     Talks on phone: Not on file     Gets together: Not on file     Attends  Anglican service: Not on file     Active member of club or organization: Not on file     Attends meetings of clubs or organizations: Not on file     Relationship status: Not on file   • Intimate partner violence:     Fear of current or ex partner: Not on file     Emotionally abused: Not on file     Physically abused: Not on file     Forced sexual activity: Not on file   Other Topics Concern   •  Service No   • Blood Transfusions Yes   • Caffeine Concern No   • Occupational Exposure No   • Hobby Hazards No   • Sleep Concern Yes   • Stress Concern Yes   • Weight Concern Yes   • Special Diet No   • Back Care No   • Exercise No   • Bike Helmet No   • Seat Belt Yes   • Self-Exams Yes   Social History Narrative   • Not on file       Current Outpatient Medications   Medication Sig Dispense Refill   • Omega-3 Fatty Acids (FISH OIL PO) Take  by mouth every day.     • FIBER PO Take 2 Caps by mouth every day. Gummy     • TRESIBA FLEXTOUCH 200 UNIT/ML Solution Pen-injector 28 Units every evening.     • celecoxib (CELEBREX) 200 MG Cap TAKE 1 CAPSULE BY MOUTH TWICE DAILY 180 Cap 0   • metoprolol SR (TOPROL XL) 25 MG TABLET SR 24 HR TAKE 1 TABLET BY MOUTH ONCE DAILY 90 Tab 0   • losartan (COZAAR) 100 MG Tab TAKE 1 TABLET BY MOUTH ONCE DAILY 90 Each 0   • amitriptyline (ELAVIL) 50 MG Tab TAKE 1 TABLET BY MOUTH AT BEDTIME 90 Tab 0   • traZODone (DESYREL) 100 MG Tab TAKE 1 TO 2 TABLETS BY MOUTH ONCE DAILY AT BEDTIME IF  NEEDED  FOR  SLEEP 180 Tab 3   • tamsulosin (FLOMAX) 0.4 MG capsule TAKE 2 CAPSULES BY MOUTH ONCE DAILY 180 Cap 1   • metformin (GLUCOPHAGE) 1000 MG tablet TAKE 1 TABLET BY MOUTH TWICE DAILY WITH MEALS 180 Tab 1   • dicyclomine (BENTYL) 10 MG Cap TAKE 1 CAPSULE BY MOUTH EVERY 6 HOURS AS NEEDED FOR ABDOMINAL CRAMPS AND DISCOMFORT  2   • pioglitazone (ACTOS) 30 MG Tab Take 1 Tab by mouth every day. 90 Tab 3   • amLODIPine (NORVASC) 10 MG Tab TAKE 1 TABLET BY MOUTH ONCE DAILY 90 Tab 3   • finasteride (PROSCAR) 5  MG Tab TAKE 1 TABLET BY MOUTH ONCE DAILY 90 Tab 3   • sertraline (ZOLOFT) 50 MG Tab TAKE 1 TABLET BY MOUTH ONCE DAILY 90 Tab 3   • gabapentin (NEURONTIN) 300 MG Cap TAKE 2 CAPSULES BY MOUTH THREE TIMES DAILY 540 Cap 0   • Misc. Devices Misc One touch ultra test strips. Patient has DM type 2 and tests twice per day 100 Each 3   • Misc. Devices Misc One touch ultra lancets; patient has diabetes mellitus type 2 and test once per day 100 Device 3   • tizanidine (ZANAFLEX) 4 MG Tab Take 1 Tab by mouth every 6 hours as needed (back pain). (Patient taking differently: Take 4 mg by mouth every day.) 270 Tab 3   • omeprazole (PRILOSEC) 20 MG delayed-release capsule TAKE ONE CAPSULE BY MOUTH ONCE DAILY 90 Cap 3   • Misc. Devices Misc CPAP supplies; fax to [facility] [phone]. 100 Device 3   • fluticasone (FLONASE) 50 MCG/ACT nasal spray Spray 2 Sprays in nose every day. Each Nostril 1 Bottle 3   • albuterol (VENTOLIN OR PROVENTIL) 108 (90 BASE) MCG/ACT AERS inhalation aerosol Inhale 2 Puffs by mouth every 6 hours as needed for Shortness of Breath. 8.5 g 3   • Multiple Vitamins-Minerals (MULTIVITAMIN PO) Take 1 Tab by mouth every day.       • Fexofenadine HCl (ALLEGRA PO) Take  by mouth as needed.     • Cholecalciferol (VITAMIN D) 2000 UNIT CAPS Take  by mouth every day.     • GLUCOSAMINE-CHONDROITIN-MSM PO Take 2 Tabs by mouth every day.     • aspirin 81 MG tablet Take 81 mg by mouth every day.       No current facility-administered medications for this visit.           Allergies: Penicillins; Phenergan [promethazine hcl]; and Influenza virus vacc      ROS   Gen: Denies fever, chills, unintentional weight loss, fatigue  Resp:Denies Dyspnea  CV: Denies chest pain, chest tightness  Sleep:Denies morning headache, insomnia, daytime somnolence, snoring, gasping for air, apnea  Neuro: Denies frequent headaches, weakness, dizziness  See HPI.  All other systems reviewed and negative        Vital signs for this encounter:  Vitals:     "02/06/20 1121 02/06/20 1127   Height:  1.88 m (6' 2\")   Weight:  (!) 144.8 kg (319 lb 5 oz)   Weight % change since last entry.:  0 %   BP:  122/70   Pulse:  79   BMI (Calculated):  41   O2 sat % room air: 95 %                    Physical Exam:   Gen:         Alert and oriented, No apparent distress.   Neck:        No Lymphadenopathy.  Lungs:     Clear to auscultation bilaterally.    CV:          Regular rate and rhythm. No murmurs, rubs or gallops.   Abd:         Soft non tender, non distended.            Ext:          No clubbing, cyanosis, edema.    Assessment   1. BRAIN (obstructive sleep apnea)  DME Mask and Supplies    OBESITY COUNSELING (No Charge): Patient identified as having weight management issue.  Appropriate orders and counseling given.   2. Morbid obesity with BMI of 40.0-44.9, adult (HCC)  OBESITY COUNSELING (No Charge): Patient identified as having weight management issue.  Appropriate orders and counseling given.       Patient is clinically stable and will proceed with following plan.     PLAN:   Patient Instructions   1) Continue autoCPAP at [id number]  2) Clean mask and supplies weekly and change them as insurance allows  3) Encourage light conditioning and dietary changes  4) Vaccines: Up to date with Pneumovax 23  5) Return in about 1 year (around 2/6/2021) for follow up with LENORE Rhoades, if not sooner, Compliance.        "

## 2020-02-06 NOTE — PROGRESS NOTES
Subjective:   Jamey Riojas is a 59 y.o. male here today for evaluation and management of:     Flank pain  Jamey continues to have persistent severe left flank pain. He had a clonoscopy which was normal, his pain specialist determined pain was not due to rib pain or thoracic radiculopathy and recommended he have evaluation done by neurology and he had an appointment for this. It was determined it may be a form of neuropathy.   He has has been undergoing evaluation by many specialists including gastroenterology and neurology and pain specialists.     CT abd/p did not reveal an etiology for the pain.      He has tried multiple medications percocet, gabapentin, muscle relaxants which do not help with the pain.      Pain is only improved when he lays on his left side.      Due to the severity of the pain he has been unable to work since the beginning of August 2019. I first evaluated him for this August 15th. The pain had been present before this but had severely worsened the first week of August to the extent he has been unable to work due to the pain. He is in severe pain even with sitting, standing and walking.      The pain has not lessened and is persistent till today and ongoing.     Patient had worked very hard to stop narcotic pain medication in the past, but current pain is so severe and unrelenting that he is even willing to go back on some narcotic pain medication as nothing else has provided relief.      He is going to have nerve studies done by his neurologist and after this may have treatments done by his PMR specialist including nerve ablations or epidural injections.     I updated his work letter today.     Diabetes mellitus type 2 in obese  He has improved blood sugars now with the tresiba about 28 units qhs. Also on the metformin 1000 bid and actos 30 mg daily.   He checks his blood sugars every morning.   Normal GFR and microalbumin.   He continues annual retinal screenings by his eye  specialist.   Notes he has some neuropathy in his legs.          Current medicines (including changes today)  Current Outpatient Medications   Medication Sig Dispense Refill   • celecoxib (CELEBREX) 200 MG Cap TAKE 1 CAPSULE BY MOUTH TWICE DAILY 180 Cap 0   • metoprolol SR (TOPROL XL) 25 MG TABLET SR 24 HR TAKE 1 TABLET BY MOUTH ONCE DAILY 90 Tab 0   • losartan (COZAAR) 100 MG Tab TAKE 1 TABLET BY MOUTH ONCE DAILY 90 Each 0   • amitriptyline (ELAVIL) 50 MG Tab TAKE 1 TABLET BY MOUTH AT BEDTIME 90 Tab 0   • traZODone (DESYREL) 100 MG Tab TAKE 1 TO 2 TABLETS BY MOUTH ONCE DAILY AT BEDTIME IF  NEEDED  FOR  SLEEP 180 Tab 3   • tamsulosin (FLOMAX) 0.4 MG capsule TAKE 2 CAPSULES BY MOUTH ONCE DAILY 180 Cap 1   • metformin (GLUCOPHAGE) 1000 MG tablet TAKE 1 TABLET BY MOUTH TWICE DAILY WITH MEALS 180 Tab 1   • dicyclomine (BENTYL) 10 MG Cap TAKE 1 CAPSULE BY MOUTH EVERY 6 HOURS AS NEEDED FOR ABDOMINAL CRAMPS AND DISCOMFORT  2   • pioglitazone (ACTOS) 30 MG Tab Take 1 Tab by mouth every day. 90 Tab 3   • amLODIPine (NORVASC) 10 MG Tab TAKE 1 TABLET BY MOUTH ONCE DAILY 90 Tab 3   • finasteride (PROSCAR) 5 MG Tab TAKE 1 TABLET BY MOUTH ONCE DAILY 90 Tab 3   • sertraline (ZOLOFT) 50 MG Tab TAKE 1 TABLET BY MOUTH ONCE DAILY 90 Tab 3   • gabapentin (NEURONTIN) 300 MG Cap TAKE 2 CAPSULES BY MOUTH THREE TIMES DAILY 540 Cap 0   • Misc. Devices Misc One touch ultra test strips. Patient has DM type 2 and tests twice per day 100 Each 3   • Misc. Devices Misc One touch ultra lancets; patient has diabetes mellitus type 2 and test once per day 100 Device 3   • tizanidine (ZANAFLEX) 4 MG Tab Take 1 Tab by mouth every 6 hours as needed (back pain). (Patient taking differently: Take 4 mg by mouth every day.) 270 Tab 3   • omeprazole (PRILOSEC) 20 MG delayed-release capsule TAKE ONE CAPSULE BY MOUTH ONCE DAILY 90 Cap 3   • Misc. Devices Misc CPAP supplies; fax to [facility] [phone]. 100 Device 3   • fluticasone (FLONASE) 50 MCG/ACT nasal spray  "Spray 2 Sprays in nose every day. Each Nostril 1 Bottle 3   • albuterol (VENTOLIN OR PROVENTIL) 108 (90 BASE) MCG/ACT AERS inhalation aerosol Inhale 2 Puffs by mouth every 6 hours as needed for Shortness of Breath. 8.5 g 3   • Multiple Vitamins-Minerals (MULTIVITAMIN PO) Take 1 Tab by mouth every day.       • Fexofenadine HCl (ALLEGRA PO) Take  by mouth as needed.     • Cholecalciferol (VITAMIN D) 2000 UNIT CAPS Take  by mouth every day.     • GLUCOSAMINE-CHONDROITIN-MSM PO Take 2 Tabs by mouth 2 Times a Day.     • aspirin 81 MG tablet Take 81 mg by mouth every day.       No current facility-administered medications for this visit.      He  has a past medical history of Anesthesia, Anxiety, Arthritis, Body aches (11/19/2014), Bronchitis (2009), Chickenpox, Diabetes, Dysuria (7/30/2014), Enlarged liver, GERD (gastroesophageal reflux disease) (12/21/2011), Heart burn, Hypertension, Indigestion, Influenza, Insomnia (12/21/2011), Nephrolithiasis (7/30/2014), Obesity, BRAIN (obstructive sleep apnea), Pain (12-14-12), Psychiatric problem, Sleep apnea, and Snoring.    ROS  No chest pain, no shortness of breath, no abdominal pain       Objective:     /70   Pulse 77   Temp 36.8 °C (98.3 °F) (Temporal)   Resp 16   Ht 1.88 m (6' 2\")   Wt (!) 144.2 kg (318 lb)   SpO2 97%  Body mass index is 40.83 kg/m².   Physical Exam:  Constitutional: Alert, no distress. Uses a walker for ambulation  Skin: Warm, dry, good turgor, no rashes in visible areas.  Eye: Equal, round and reactive, conjunctiva clear, lids normal.  ENMT: Lips without lesions, good dentition, oropharynx clear.  Neck: Trachea midline, no masses, no thyromegaly. No cervical or supraclavicular lymphadenopathy  Respiratory: Unlabored respiratory effort, lungs clear to auscultation, no wheezes, no ronchi.  Cardiovascular: Normal S1, S2, no murmur, no edema.  Abdomen: Soft, TTP left flank over ribs and left abdomen, no masses, no hepatosplenomegaly.  Psych: Alert " and oriented x3, normal affect and mood.        Assessment and Plan:   The following treatment plan was discussed    1. Flank pain  Persistent, unresolved.   Continue to follow up with neurology and PMR specialist as scheduled for further treatment and evaluation.   Work notes and letters done.   Will fax these today.     2. Diabetes mellitus type 2 in obese (HCC)  Improving, recheck labs.   - HEMOGLOBIN A1C; Future  - Comp Metabolic Panel; Future      Followup: Return in about 6 months (around 8/5/2020), or if symptoms worsen or fail to improve, for DM, flank pain.

## 2020-02-06 NOTE — PATIENT INSTRUCTIONS
1) Continue autoCPAP at [id number]  2) Clean mask and supplies weekly and change them as insurance allows  3) Encourage light conditioning and dietary changes  4) Vaccines: Up to date with Pneumovax 23  5) Return in about 1 year (around 2/6/2021) for follow up with LENORE Rhoades, if not sooner, Compliance.

## 2020-02-06 NOTE — ASSESSMENT & PLAN NOTE
Jamey continues to have persistent severe left flank pain. He had a clonoscopy which was normal, his pain specialist determined pain was not due to rib pain or thoracic radiculopathy and recommended he have evaluation done by neurology and he had an appointment for this. It was determined it may be a form of neuropathy.   He has has been undergoing evaluation by many specialists including gastroenterology and neurology and pain specialists.     CT abd/p did not reveal an etiology for the pain.      He has tried multiple medications percocet, gabapentin, muscle relaxants which do not help with the pain.      Pain is only improved when he lays on his left side.      Due to the severity of the pain he has been unable to work since the beginning of August 2019. I first evaluated him for this August 15th. The pain had been present before this but had severely worsened the first week of August to the extent he has been unable to work due to the pain. He is in severe pain even with sitting, standing and walking.      The pain has not lessened and is persistent till today and ongoing.     Patient had worked very hard to stop narcotic pain medication in the past, but current pain is so severe and unrelenting that he is even willing to go back on some narcotic pain medication as nothing else has provided relief.      He is going to have nerve studies done by his neurologist and after this may have treatments done by his PMR specialist including nerve ablations or epidural injections.     I updated his work letter today.

## 2020-02-06 NOTE — ASSESSMENT & PLAN NOTE
He has improved blood sugars now with the tresiba about 28 units qhs. Also on the metformin 1000 bid and actos 30 mg daily.   He checks his blood sugars every morning.   Normal GFR and microalbumin.   He continues annual retinal screenings by his eye specialist.   Notes he has some neuropathy in his legs.

## 2020-02-18 ENCOUNTER — TELEPHONE (OUTPATIENT)
Dept: MEDICAL GROUP | Facility: PHYSICIAN GROUP | Age: 60
End: 2020-02-18

## 2020-02-18 NOTE — TELEPHONE ENCOUNTER
MEDICATION PRIOR AUTHORIZATION NEEDED:    1. Name of Medication: Tresiba    2. Requested By (Name of Pharmacy): [facility]     3. Is insurance on file current? yes    4. What is the name & phone number of the 3rd party payor? [employer]    Key: [id number]

## 2020-02-19 ENCOUNTER — TELEPHONE (OUTPATIENT)
Dept: MEDICAL GROUP | Facility: PHYSICIAN GROUP | Age: 60
End: 2020-02-19

## 2020-02-19 NOTE — TELEPHONE ENCOUNTER
Called and spoke with Teena at [facility] to follow up on status of the disabality claim. Currently is review and a determination will be made 2/20/19 pm. I will follow up on Friday.

## 2020-02-21 ENCOUNTER — OFFICE VISIT (OUTPATIENT)
Dept: MEDICAL GROUP | Facility: PHYSICIAN GROUP | Age: 60
End: 2020-02-21
Payer: COMMERCIAL

## 2020-02-21 VITALS
DIASTOLIC BLOOD PRESSURE: 84 MMHG | SYSTOLIC BLOOD PRESSURE: 142 MMHG | HEIGHT: 74 IN | OXYGEN SATURATION: 96 % | TEMPERATURE: 98.4 F | RESPIRATION RATE: 12 BRPM | HEART RATE: 72 BPM | BODY MASS INDEX: 40.43 KG/M2 | WEIGHT: 315 LBS

## 2020-02-21 DIAGNOSIS — S01.80XA OPEN WOUND OF FOREHEAD, INITIAL ENCOUNTER: ICD-10-CM

## 2020-02-21 DIAGNOSIS — R10.9 FLANK PAIN: ICD-10-CM

## 2020-02-21 PROCEDURE — 99214 OFFICE O/P EST MOD 30 MIN: CPT | Performed by: FAMILY MEDICINE

## 2020-02-21 RX ORDER — INSULIN GLARGINE 100 [IU]/ML
30 INJECTION, SOLUTION SUBCUTANEOUS EVERY EVENING
Qty: 4 PEN | Refills: 11 | Status: SHIPPED | OUTPATIENT
Start: 2020-02-21 | End: 2020-08-05

## 2020-02-21 NOTE — ASSESSMENT & PLAN NOTE
About a week ago, a zit formed on right side of his forehead and then drained,   Today in clinic it has a dry scab over the wound about 1 cm long across right side of forehead. No drainage, no pus, mild surrounding redness no fluctuance or induration.   Advised to keep clean with soap and water, antibacterial ointment provided.   Report any worsening pus/redness/pain.

## 2020-02-21 NOTE — PROGRESS NOTES
Subjective:   Jamey Riojas is a 59 y.o. male here today for evaluation and management of:     Flank pain  Jamey presents for follow up of persistent severe left flank pain.     Pain worsened in August 2019 to the point he cannot walk or sit or stand longer than  5 minutes without severe pain. Pain severity  9-10/10. Pain is constant. Only relieved with laying on the left side.   Due to this severity of pain inhibiting functions like walking, sitting and standing which are necessary for work, he is unable to work. He cannot lift any weight greater than 5 lbs without pain.     Already been evaluated by pain specialist, neurology and spine specialists and GI specialists and no medication so far has controlled the pain. He continues to be regular with his follow up visits to the specialists and has evaluations for nerve conduction studies and procedures such as epidural injections or nerve ablations planned in the future which may help improve his pain.         Wound, open, forehead  About a week ago, a zit formed on right side of his forehead and then drained,   Today in clinic it has a dry scab over the wound about 1 cm long across right side of forehead. No drainage, no pus, mild surrounding redness no fluctuance or induration.   Advised to keep clean with soap and water, antibacterial ointment provided.   Report any worsening pus/redness/pain.          Current medicines (including changes today)  Current Outpatient Medications   Medication Sig Dispense Refill   • insulin glargine (INSULIN GLARGINE) 100 UNIT/ML Solution Pen-injector injection Inject 30 Units as instructed every evening. Increase by 2 units every 3 days till fasting sugar 100-120 4 PEN 11   • Omega-3 Fatty Acids (FISH OIL PO) Take  by mouth every day.     • FIBER PO Take 2 Caps by mouth every day. Gummy     • celecoxib (CELEBREX) 200 MG Cap TAKE 1 CAPSULE BY MOUTH TWICE DAILY 180 Cap 0   • metoprolol SR (TOPROL XL) 25 MG TABLET SR 24 HR  TAKE 1 TABLET BY MOUTH ONCE DAILY 90 Tab 0   • losartan (COZAAR) 100 MG Tab TAKE 1 TABLET BY MOUTH ONCE DAILY 90 Each 0   • amitriptyline (ELAVIL) 50 MG Tab TAKE 1 TABLET BY MOUTH AT BEDTIME 90 Tab 0   • traZODone (DESYREL) 100 MG Tab TAKE 1 TO 2 TABLETS BY MOUTH ONCE DAILY AT BEDTIME IF  NEEDED  FOR  SLEEP 180 Tab 3   • tamsulosin (FLOMAX) 0.4 MG capsule TAKE 2 CAPSULES BY MOUTH ONCE DAILY 180 Cap 1   • metformin (GLUCOPHAGE) 1000 MG tablet TAKE 1 TABLET BY MOUTH TWICE DAILY WITH MEALS 180 Tab 1   • dicyclomine (BENTYL) 10 MG Cap TAKE 1 CAPSULE BY MOUTH EVERY 6 HOURS AS NEEDED FOR ABDOMINAL CRAMPS AND DISCOMFORT  2   • pioglitazone (ACTOS) 30 MG Tab Take 1 Tab by mouth every day. 90 Tab 3   • amLODIPine (NORVASC) 10 MG Tab TAKE 1 TABLET BY MOUTH ONCE DAILY 90 Tab 3   • finasteride (PROSCAR) 5 MG Tab TAKE 1 TABLET BY MOUTH ONCE DAILY 90 Tab 3   • sertraline (ZOLOFT) 50 MG Tab TAKE 1 TABLET BY MOUTH ONCE DAILY 90 Tab 3   • gabapentin (NEURONTIN) 300 MG Cap TAKE 2 CAPSULES BY MOUTH THREE TIMES DAILY 540 Cap 0   • Misc. Devices Misc One touch ultra test strips. Patient has DM type 2 and tests twice per day 100 Each 3   • Misc. Devices Misc One touch ultra lancets; patient has diabetes mellitus type 2 and test once per day 100 Device 3   • tizanidine (ZANAFLEX) 4 MG Tab Take 1 Tab by mouth every 6 hours as needed (back pain). (Patient taking differently: Take 4 mg by mouth every day.) 270 Tab 3   • omeprazole (PRILOSEC) 20 MG delayed-release capsule TAKE ONE CAPSULE BY MOUTH ONCE DAILY 90 Cap 3   • Misc. Devices Misc CPAP supplies; fax to [place] [phone]. 100 Device 3   • fluticasone (FLONASE) 50 MCG/ACT nasal spray Spray 2 Sprays in nose every day. Each Nostril 1 Bottle 3   • albuterol (VENTOLIN OR PROVENTIL) 108 (90 BASE) MCG/ACT AERS inhalation aerosol Inhale 2 Puffs by mouth every 6 hours as needed for Shortness of Breath. 8.5 g 3   • Multiple Vitamins-Minerals (MULTIVITAMIN PO) Take 1 Tab by mouth every day.      "  • Fexofenadine HCl (ALLEGRA PO) Take  by mouth as needed.     • Cholecalciferol (VITAMIN D) 2000 UNIT CAPS Take  by mouth every day.     • GLUCOSAMINE-CHONDROITIN-MSM PO Take 2 Tabs by mouth every day.     • aspirin 81 MG tablet Take 81 mg by mouth every day.       No current facility-administered medications for this visit.      He  has a past medical history of Anesthesia, Anxiety, Arthritis, Body aches (11/19/2014), Bronchitis (2009), Chickenpox, Diabetes, Dysuria (7/30/2014), Enlarged liver, GERD (gastroesophageal reflux disease) (12/21/2011), Heart burn, Hypertension, Indigestion, Influenza, Insomnia (12/21/2011), Nephrolithiasis (7/30/2014), Obesity, BRAIN (obstructive sleep apnea), Pain (12-14-12), Psychiatric problem, Sleep apnea, and Snoring.    ROS  No chest pain, no shortness of breath, no abdominal pain       Objective:     /84   Pulse 72   Temp 36.9 °C (98.4 °F) (Temporal)   Resp 12   Ht 1.88 m (6' 2\")   Wt (!) 147.9 kg (326 lb)   SpO2 96%  Body mass index is 41.86 kg/m².   Physical Exam:  Constitutional: Alert, no distress.  Skin: Warm, dry, good turgor, no rashes in visible areas.  Eye: Equal, round and reactive, conjunctiva clear, lids normal.  ENMT: Lips without lesions, good dentition, oropharynx clear.  Neck: Trachea midline, no masses, no thyromegaly. No cervical or supraclavicular lymphadenopathy  Respiratory: Unlabored respiratory effort, lungs clear to auscultation, no wheezes, no ronchi.  Cardiovascular: Normal S1, S2, no murmur, no edema.  Abdomen: Soft, non-tender, no masses, no hepatosplenomegaly.  Psych: Alert and oriented x3, normal affect and mood.        Assessment and Plan:   The following treatment plan was discussed    1. Flank pain  Unresolved.   Persistent.   Follow up with neurology,   PMR.       2. Open wound of forehead, initial encounter  Wound care instructions provided.       Followup: Return for as scheduled in August. .         "

## 2020-02-21 NOTE — ASSESSMENT & PLAN NOTE
Jamey presents for follow up of persistent severe left flank pain.     Pain worsened in August 2019 to the point he cannot walk or sit or stand longer than  5 minutes without severe pain. Pain severity  9-10/10. Pain is constant. Only relieved with laying on the left side.   Due to this severity of pain inhibiting functions like walking, sitting and standing which are necessary for work, he is unable to work. He cannot lift any weight greater than 5 lbs without pain.     Already been evaluated by pain specialist, neurology and spine specialists and GI specialists and no medication so far has controlled the pain. He continues to be regular with his follow up visits to the specialists and has evaluations for nerve conduction studies and procedures such as epidural injections or nerve ablations planned in the future which may help improve his pain.

## 2020-02-25 ENCOUNTER — TELEPHONE (OUTPATIENT)
Dept: MEDICAL GROUP | Facility: PHYSICIAN GROUP | Age: 60
End: 2020-02-25

## 2020-02-25 NOTE — TELEPHONE ENCOUNTER
I have called and LVM to inform patient Ryann Street M.D. sent a new RX to patients pharmacy but the directions are different. Patient advised to call back for additional details

## 2020-02-25 NOTE — TELEPHONE ENCOUNTER
Patient called stating that the Lantus is too expensive. Can we try something else? Patient is wondering if Novolog N would work? His wife is on that med. Thanks

## 2020-02-25 NOTE — TELEPHONE ENCOUNTER
Please let the patient know Novolin is an intermediate acting insulin peaks at 4-12 hours and effective for 12-18 hours as opposed to lantus or other longer acting insulins.   I've sent a rx for the [facility]volin to [facility]. He can take this twice a day. Current long acting dose is 35 qhs so I've split this in two for 15 bid but he can increase doses as necessary for blood sugar control.   Ryann Street M.D.    Routing comment

## 2020-03-30 RX ORDER — LOSARTAN POTASSIUM 100 MG/1
TABLET ORAL
Qty: 90 EACH | Refills: 1 | Status: SHIPPED | OUTPATIENT
Start: 2020-03-30 | End: 2020-11-11 | Stop reason: SDUPTHER

## 2020-03-30 RX ORDER — METOPROLOL SUCCINATE 25 MG/1
TABLET, EXTENDED RELEASE ORAL
Qty: 90 TAB | Refills: 1 | Status: SHIPPED | OUTPATIENT
Start: 2020-03-30 | End: 2020-06-30

## 2020-03-30 RX ORDER — AMITRIPTYLINE HYDROCHLORIDE 50 MG/1
TABLET, FILM COATED ORAL
Qty: 90 TAB | Refills: 1 | Status: SHIPPED | OUTPATIENT
Start: 2020-03-30 | End: 2020-10-08 | Stop reason: SDUPTHER

## 2020-03-30 NOTE — TELEPHONE ENCOUNTER
Refill X 6 months, sent to pharmacy.Pt. Seen in the last 6 months per protocol.   Lab Results   Component Value Date/Time    SODIUM 138 11/14/2019 09:05 AM    POTASSIUM 3.7 11/14/2019 09:05 AM    CHLORIDE 103 11/14/2019 09:05 AM    CO2 24 11/14/2019 09:05 AM    GLUCOSE 254 (H) 11/14/2019 09:05 AM    BUN 12 11/14/2019 09:05 AM    CREATININE 0.82 11/14/2019 09:05 AM    CREATININE 0.8 09/10/2007 10:15 AM

## 2020-03-30 NOTE — TELEPHONE ENCOUNTER
Was the patient seen in the last year in this department? Yes    Does patient have an active prescription for medications requested? No     Received Request Via: Pharmacy      Pt met protocol?: Yes   Last ov 2/2020   BP Readings from Last 1 Encounters:   02/21/20 142/84     Lab Results   Component Value Date/Time    SODIUM 138 11/14/2019 09:05 AM    POTASSIUM 3.7 11/14/2019 09:05 AM    CHLORIDE 103 11/14/2019 09:05 AM    CO2 24 11/14/2019 09:05 AM    GLUCOSE 254 (H) 11/14/2019 09:05 AM    BUN 12 11/14/2019 09:05 AM    CREATININE 0.82 11/14/2019 09:05 AM    CREATININE 0.8 09/10/2007 10:15 AM

## 2020-04-12 DIAGNOSIS — F51.01 PRIMARY INSOMNIA: ICD-10-CM

## 2020-04-14 DIAGNOSIS — F51.01 PRIMARY INSOMNIA: ICD-10-CM

## 2020-04-14 RX ORDER — ZOLPIDEM TARTRATE 10 MG/1
TABLET ORAL
Qty: 90 EACH | Refills: 1 | Status: SHIPPED
Start: 2020-04-14 | End: 2020-04-14 | Stop reason: SDUPTHER

## 2020-04-14 RX ORDER — ZOLPIDEM TARTRATE 10 MG/1
TABLET ORAL
Qty: 90 EACH | Refills: 1 | Status: SHIPPED
Start: 2020-04-14 | End: 2020-08-04 | Stop reason: SDUPTHER

## 2020-04-14 NOTE — TELEPHONE ENCOUNTER
Betty,   Please could you sign this prescription so M[facility] can fax it over?   Thank you very much!  Ryann

## 2020-05-06 DIAGNOSIS — N40.1 BENIGN NODULAR PROSTATIC HYPERPLASIA WITH LOWER URINARY TRACT SYMPTOMS: ICD-10-CM

## 2020-05-06 DIAGNOSIS — M48.062 LUMBAR STENOSIS WITH NEUROGENIC CLAUDICATION: ICD-10-CM

## 2020-05-06 DIAGNOSIS — Z12.5 SCREENING FOR PROSTATE CANCER: ICD-10-CM

## 2020-05-06 DIAGNOSIS — F33.1 MODERATE EPISODE OF RECURRENT MAJOR DEPRESSIVE DISORDER (HCC): ICD-10-CM

## 2020-05-06 NOTE — TELEPHONE ENCOUNTER
Was the patient seen in the last year in this department? Yes    Does patient have an active prescription for medications requested? No     Received Request Via: Pharmacy    Pt met protocol?: Yes     Last OV 02/21/2020

## 2020-05-07 RX ORDER — CELECOXIB 200 MG/1
CAPSULE ORAL
Qty: 180 CAP | Refills: 1 | Status: SHIPPED | OUTPATIENT
Start: 2020-05-07 | End: 2020-11-17

## 2020-05-07 RX ORDER — FINASTERIDE 5 MG/1
TABLET, FILM COATED ORAL
Qty: 90 TAB | Refills: 1 | Status: SHIPPED | OUTPATIENT
Start: 2020-05-07 | End: 2020-12-15

## 2020-05-27 DIAGNOSIS — N40.1 BENIGN NODULAR PROSTATIC HYPERPLASIA WITH LOWER URINARY TRACT SYMPTOMS: ICD-10-CM

## 2020-05-28 RX ORDER — TAMSULOSIN HYDROCHLORIDE 0.4 MG/1
CAPSULE ORAL
Qty: 180 CAP | Refills: 1 | Status: SHIPPED | OUTPATIENT
Start: 2020-05-28 | End: 2020-12-15

## 2020-05-28 NOTE — TELEPHONE ENCOUNTER
Patient has recently been seen by PCP within the last 6 months per protocol (2/20). Will refill medications for 6 months.  Lab Results   Component Value Date/Time    HBA1C 9.9 (H) 11/14/2019 09:05 AM      Lab Results   Component Value Date/Time    MALBCRT 3 08/16/2019 11:01 AM    MICROALBUR 0.7 08/16/2019 11:01 AM      Lab Results   Component Value Date/Time    ALKPHOSPHAT 118 (H) 11/14/2019 09:05 AM    ASTSGOT 24 11/14/2019 09:05 AM    ALTSGPT 28 11/14/2019 09:05 AM    TBILIRUBIN 1.3 11/14/2019 09:05 AM

## 2020-06-04 ENCOUNTER — TELEPHONE (OUTPATIENT)
Dept: MEDICAL GROUP | Facility: PHYSICIAN GROUP | Age: 60
End: 2020-06-04

## 2020-06-04 NOTE — TELEPHONE ENCOUNTER
Patients wife called and stated [employer] is going to stop payments unless they recived medicail records. Per patient they have been requesting records. There are no recent request faxed to us.     Patient is going to bring in a release of information and discuss the records to send

## 2020-06-27 DIAGNOSIS — I10 ESSENTIAL HYPERTENSION: Primary | ICD-10-CM

## 2020-06-30 RX ORDER — METOPROLOL SUCCINATE 25 MG/1
TABLET, EXTENDED RELEASE ORAL
Qty: 90 TAB | Refills: 1 | Status: SHIPPED
Start: 2020-06-30 | End: 2020-09-24 | Stop reason: SDUPTHER

## 2020-06-30 RX ORDER — AMLODIPINE BESYLATE 10 MG/1
TABLET ORAL
Qty: 90 TAB | Refills: 1 | Status: SHIPPED | OUTPATIENT
Start: 2020-06-30 | End: 2020-12-15

## 2020-07-20 RX ORDER — GABAPENTIN 300 MG/1
CAPSULE ORAL
Qty: 810 CAP | Refills: 3 | Status: SHIPPED | OUTPATIENT
Start: 2020-07-20 | End: 2021-09-07

## 2020-08-04 ENCOUNTER — OFFICE VISIT (OUTPATIENT)
Dept: MEDICAL GROUP | Facility: PHYSICIAN GROUP | Age: 60
End: 2020-08-04
Payer: COMMERCIAL

## 2020-08-04 VITALS
HEIGHT: 74 IN | SYSTOLIC BLOOD PRESSURE: 136 MMHG | DIASTOLIC BLOOD PRESSURE: 60 MMHG | BODY MASS INDEX: 40.43 KG/M2 | HEART RATE: 82 BPM | RESPIRATION RATE: 16 BRPM | TEMPERATURE: 97.2 F | OXYGEN SATURATION: 95 % | WEIGHT: 315 LBS

## 2020-08-04 DIAGNOSIS — E11.69 DIABETES MELLITUS TYPE 2 IN OBESE: ICD-10-CM

## 2020-08-04 DIAGNOSIS — F51.01 PRIMARY INSOMNIA: ICD-10-CM

## 2020-08-04 DIAGNOSIS — I10 ESSENTIAL HYPERTENSION: ICD-10-CM

## 2020-08-04 DIAGNOSIS — E66.9 DIABETES MELLITUS TYPE 2 IN OBESE: ICD-10-CM

## 2020-08-04 PROCEDURE — 99214 OFFICE O/P EST MOD 30 MIN: CPT | Performed by: FAMILY MEDICINE

## 2020-08-04 RX ORDER — ZOLPIDEM TARTRATE 10 MG/1
TABLET ORAL
Qty: 90 EACH | Refills: 1 | Status: SHIPPED | OUTPATIENT
Start: 2020-10-17 | End: 2021-01-31

## 2020-08-04 ASSESSMENT — FIBROSIS 4 INDEX: FIB4 SCORE: 1.47

## 2020-08-04 NOTE — ASSESSMENT & PLAN NOTE
Controlled.   Medications: metoprolol XL 25mg, losartan 100mg, amlodipine 10 mg  He has no chest pain. He has baseline SOB due to obesity.

## 2020-08-04 NOTE — LETTER
Critical access hospital  Ryann Street M.D.  [address] [place] [address]  Fax: [phone]   Authorization for Release/Disclosure of   Protected Health Information   Name: CAROL RIOJAS : 1960 SSN: [id number]   Address:  [facility] [address] Phone:    [phone] (home)    I authorize the entity listed below to release/disclose the PHI below to:   Critical access hospital/Ryann Street M.D. and Ryann Street M.D.   Provider or Entity Name: [facility]    Address   City, [facility], Zip  [address] Phone:      Fax:     Reason for request: continuity of care   Information to be released:    [  ] LAST COLONOSCOPY,  including any PATH REPORT and follow-up  [  ] LAST FIT/COLOGUARD RESULT [  ] LAST DEXA  [  ] LAST MAMMOGRAM  [  ] LAST PAP  [  ] LAST LABS [ X ] RETINA EXAM REPORT  [  ] IMMUNIZATION RECORDS  [  ] Release all info      [  ] Check here and initial the line next to each item to release ALL health information INCLUDING  _____ Care and treatment for drug and / or alcohol abuse  _____ HIV testing, infection status, or AIDS  _____ Genetic Testing    DATES OF SERVICE OR TIME PERIOD TO BE DISCLOSED: _____________  I understand and acknowledge that:  * This Authorization may be revoked at any time by you in writing, except if your health information has already been used or disclosed.  * Your health information that will be used or disclosed as a result of you signing this authorization could be re-disclosed by the recipient. If this occurs, your re-disclosed health information may no longer be protected by State or Federal laws.  * You may refuse to sign this Authorization. Your refusal will not affect your ability to obtain treatment.  * This Authorization becomes effective upon signing and will  on (date) __________.      If no date is indicated, this Authorization will  one (1) year from the signature date.    Name: Carol Riojas    Signature:   Date:          8/4/2020       PLEASE FAX REQUESTED RECORDS BACK TO: [phone]

## 2020-08-04 NOTE — LETTER
[facility]  Ryann Street M.D.  [address] [place] [address]  Fax: [phone]   Authorization for Release/Disclosure of   Protected Health Information   Name: CAROL SARAVIA : 1960 SSN: [id number]   Address:  [facility] [place] [address] Phone:    [phone] (home)    I authorize the entity listed below to release/disclose the PHI below to:   [facility]/Ryann Street M.D. and Ryann Street M.D.   Provider or Entity Name:  [facility]   Address   [facility], [facility], Zip            [address] [place] [address] Phone:  [phone]      Fax:       [phone]          Reason for request: continuity of care   Information to be released:    [ X ] LAST COLONOSCOPY,  including any PATH REPORT and follow-up  [ X ] LAST FIT/COLOGUARD RESULT [  ] LAST DEXA  [  ] LAST MAMMOGRAM  [  ] LAST PAP  [  ] LAST LABS [  ] RETINA EXAM REPORT  [  ] IMMUNIZATION RECORDS  [  ] Release all info      [  ] Check here and initial the line next to each item to release ALL health information INCLUDING  _____ Care and treatment for drug and / or alcohol abuse  _____ HIV testing, infection status, or AIDS  _____ Genetic Testing    DATES OF SERVICE OR TIME PERIOD TO BE DISCLOSED: _____________  I understand and acknowledge that:  * This Authorization may be revoked at any time by you in writing, except if your health information has already been used or disclosed.  * Your health information that will be used or disclosed as a result of you signing this authorization could be re-disclosed by the recipient. If this occurs, your re-disclosed health information may no longer be protected by State or Federal laws.  * You may refuse to sign this Authorization. Your refusal will not affect your ability to obtain treatment.  * This Authorization becomes effective upon signing and will  on (date) __________.      If no date is indicated, this Authorization will  one (1) year  from the signature date.    Name: Jamey Riojas    Signature:   Date:     8/4/2020       PLEASE FAX REQUESTED RECORDS BACK TO: [phone]

## 2020-08-05 NOTE — ASSESSMENT & PLAN NOTE
Refills for ambien 10 mg provided.    reviewed with no concerns  Advised to avoid alcohol  He is aware of risk of taking this medication with narcotic pain medication which is prescribed for him by his pain specialist.

## 2020-08-05 NOTE — PROGRESS NOTES
Subjective:   Carol Riojas is a 60 y.o. male here today for evaluation and management of:     HTN (hypertension)  Controlled.   Medications: metoprolol XL 25mg, losartan 100mg, amlodipine 10 mg  He has no chest pain. He has baseline SOB due to obesity.       Insomnia  Refills for ambien 10 mg provided.    reviewed with no concerns  Advised to avoid alcohol  He is aware of risk of taking this medication with narcotic pain medication which is prescribed for him by his pain specialist.       Diabetes mellitus type 2 in obese  Uncontrolled,   Results for CAROL RIOJAS (MRN [id number]) as of 8/5/2020 07:33   Ref. Range 7/26/2018 13:25 11/8/2018 09:03 5/2/2019 10:20 8/16/2019 11:01 11/14/2019 09:05   Glycohemoglobin Latest Ref Range: 0.0 - 5.6 % 7.2 7.9 (H) 8.0 (H) 8.3 (H) 9.9 (H)   Medications:   Metformin 1000 mg bid, NPH 15 units bid, actos30 mg daily.   He is doing slightly better with pain control since seeing a pain specialist. Encouraged regular activity, dietary modification.          Current medicines (including changes today)  Current Outpatient Medications   Medication Sig Dispense Refill   • [START ON 10/17/2020] zolpidem (AMBIEN) 10 MG Tab TAKE 1 TABLET BY MOUTH ONCE DAILY AT BEDTIME AS NEEDED FOR SLEEP FOR 30 DAYS 90 Each 1   • gabapentin (NEURONTIN) 300 MG Cap TAKE 3 CAPSULES BY MOUTH THREE TIMES DAILY 810 Cap 3   • metoprolol SR (TOPROL XL) 25 MG TABLET SR 24 HR Take 1 tablet by mouth once daily 90 Tab 1   • amLODIPine (NORVASC) 10 MG Tab Take 1 tablet by mouth once daily 90 Tab 1   • tamsulosin (FLOMAX) 0.4 MG capsule Take 2 capsules by mouth once daily 180 Cap 1   • metformin (GLUCOPHAGE) 1000 MG tablet TAKE 1 TABLET BY MOUTH TWICE DAILY WITH MEALS 180 Tab 1   • celecoxib (CELEBREX) 200 MG Cap Take 1 capsule by mouth twice daily 180 Cap 1   • sertraline (ZOLOFT) 50 MG Tab Take 1 tablet by mouth once daily 90 Tab 1   • finasteride (PROSCAR) 5 MG Tab Take 1 tablet by mouth once daily 90  Tab 1   • amitriptyline (ELAVIL) 50 MG Tab TAKE 1 TABLET BY MOUTH AT BEDTIME 90 Tab 1   • losartan (COZAAR) 100 MG Tab Take 1 tablet by mouth once daily 90 Each 1   • insulin NPH (NOVOLIN N RELION) 100 UNIT/ML Suspension Inject 15 Units as instructed 2 times a day. 10 mL 11   • Omega-3 Fatty Acids (FISH OIL PO) Take  by mouth every day.     • FIBER PO Take 2 Caps by mouth every day. Gummy     • traZODone (DESYREL) 100 MG Tab TAKE 1 TO 2 TABLETS BY MOUTH ONCE DAILY AT BEDTIME IF  NEEDED  FOR  SLEEP 180 Tab 3   • pioglitazone (ACTOS) 30 MG Tab Take 1 Tab by mouth every day. 90 Tab 3   • Misc. Devices Misc One touch ultra test strips. Patient has DM type 2 and tests twice per day 100 Each 3   • Misc. Devices Misc One touch ultra lancets; patient has diabetes mellitus type 2 and test once per day 100 Device 3   • tizanidine (ZANAFLEX) 4 MG Tab Take 1 Tab by mouth every 6 hours as needed (back pain). (Patient taking differently: Take 4 mg by mouth every day.) 270 Tab 3   • omeprazole (PRILOSEC) 20 MG delayed-release capsule TAKE ONE CAPSULE BY MOUTH ONCE DAILY 90 Cap 3   • Misc. Devices Misc CPAP supplies; fax to [facility] [phone]. 100 Device 3   • fluticasone (FLONASE) 50 MCG/ACT nasal spray Spray 2 Sprays in nose every day. Each Nostril 1 Bottle 3   • Multiple Vitamins-Minerals (MULTIVITAMIN PO) Take 1 Tab by mouth every day.       • Fexofenadine HCl (ALLEGRA PO) Take  by mouth as needed.     • Cholecalciferol (VITAMIN D) 2000 UNIT CAPS Take  by mouth every day.     • GLUCOSAMINE-CHONDROITIN-MSM PO Take 2 Tabs by mouth every day.     • aspirin 81 MG tablet Take 81 mg by mouth every day.     • albuterol (VENTOLIN OR PROVENTIL) 108 (90 BASE) MCG/ACT AERS inhalation aerosol Inhale 2 Puffs by mouth every 6 hours as needed for Shortness of Breath. 8.5 g 3     No current facility-administered medications for this visit.      He  has a past medical history of Anesthesia, Anxiety, Arthritis, Body aches (11/19/2014), Bronchitis  "(2009), Chickenpox, Diabetes, Dysuria (7/30/2014), Enlarged liver, GERD (gastroesophageal reflux disease) (12/21/2011), Heart burn, Hypertension, Indigestion, Influenza, Insomnia (12/21/2011), Nephrolithiasis (7/30/2014), Obesity, BRAIN (obstructive sleep apnea), Pain (12-14-12), Psychiatric problem, Sleep apnea, and Snoring.    ROS  No chest pain, no shortness of breath, no abdominal pain       Objective:     /60   Pulse 82   Temp 36.2 °C (97.2 °F) (Temporal)   Resp 16   Ht 1.88 m (6' 2\")   Wt (!) 145.6 kg (321 lb)   SpO2 95%  Body mass index is 41.21 kg/m².   Physical Exam:  Constitutional: Alert, no distress.  Skin: Warm, dry, good turgor, no rashes in visible areas.  Eye: Equal, round and reactive, conjunctiva clear, lids normal.  ENMT: Lips without lesions, good dentition, oropharynx clear.  Neck: Trachea midline, no masses, no thyromegaly. No cervical or supraclavicular lymphadenopathy  Respiratory: Unlabored respiratory effort, lungs clear to auscultation, no wheezes, no ronchi.  Cardiovascular: Normal S1, S2, no murmur, no edema.  Abdomen: Soft, non-tender, no masses, no hepatosplenomegaly.  Psych: Alert and oriented x3, normal affect and mood.        Assessment and Plan:   The following treatment plan was discussed    1. Diabetes mellitus type 2 in obese (HCC)  Uncontrolled.   Did not get labs done, repeat labs ordered.   - HEMOGLOBIN A1C; Future  - Comp Metabolic Panel; Future  - Lipid Profile; Future  - MICROALBUMIN CREAT RATIO URINE; Future    2. Primary insomnia  Controlled ambien refilled  - zolpidem (AMBIEN) 10 MG Tab; TAKE 1 TABLET BY MOUTH ONCE DAILY AT BEDTIME AS NEEDED FOR SLEEP FOR 30 DAYS  Dispense: 90 Each; Refill: 1    3. Essential hypertension  Controlled.       Followup: Return for oct 2nd week, DM, HTN, dyslipidemia, insomnia ambien refill.         "

## 2020-08-05 NOTE — ASSESSMENT & PLAN NOTE
Uncontrolled,   Results for CAROL SARAVIA (MRN [id number]) as of 8/5/2020 07:33   Ref. Range 7/26/2018 13:25 11/8/2018 09:03 5/2/2019 10:20 8/16/2019 11:01 11/14/2019 09:05   Glycohemoglobin Latest Ref Range: 0.0 - 5.6 % 7.2 7.9 (H) 8.0 (H) 8.3 (H) 9.9 (H)   Medications:   Metformin 1000 mg bid, NPH 15 units bid, actos30 mg daily.   He is doing slightly better with pain control since seeing a pain specialist. Encouraged regular activity, dietary modification.

## 2020-09-11 DIAGNOSIS — E66.9 DIABETES MELLITUS TYPE 2 IN OBESE: ICD-10-CM

## 2020-09-11 DIAGNOSIS — E11.69 DIABETES MELLITUS TYPE 2 IN OBESE: ICD-10-CM

## 2020-09-11 RX ORDER — PIOGLITAZONEHYDROCHLORIDE 30 MG/1
30 TABLET ORAL DAILY
Qty: 90 TAB | Refills: 3 | Status: SHIPPED | OUTPATIENT
Start: 2020-09-11 | End: 2021-12-07

## 2020-09-24 DIAGNOSIS — I10 ESSENTIAL HYPERTENSION: ICD-10-CM

## 2020-09-24 RX ORDER — METOPROLOL SUCCINATE 25 MG/1
TABLET, EXTENDED RELEASE ORAL
Qty: 90 TAB | Refills: 0 | Status: SHIPPED | OUTPATIENT
Start: 2020-09-24 | End: 2020-10-13 | Stop reason: SDUPTHER

## 2020-10-02 ENCOUNTER — HOSPITAL ENCOUNTER (OUTPATIENT)
Dept: LAB | Facility: MEDICAL CENTER | Age: 60
End: 2020-10-02
Attending: FAMILY MEDICINE
Payer: COMMERCIAL

## 2020-10-02 DIAGNOSIS — E66.9 DIABETES MELLITUS TYPE 2 IN OBESE: ICD-10-CM

## 2020-10-02 DIAGNOSIS — E11.69 DIABETES MELLITUS TYPE 2 IN OBESE: ICD-10-CM

## 2020-10-02 LAB
ALBUMIN SERPL BCP-MCNC: 3.8 G/DL (ref 3.2–4.9)
ALBUMIN/GLOB SERPL: 1.5 G/DL
ALP SERPL-CCNC: 131 U/L (ref 30–99)
ALT SERPL-CCNC: 30 U/L (ref 2–50)
ANION GAP SERPL CALC-SCNC: 12 MMOL/L (ref 7–16)
AST SERPL-CCNC: 21 U/L (ref 12–45)
BILIRUB SERPL-MCNC: 0.7 MG/DL (ref 0.1–1.5)
BUN SERPL-MCNC: 16 MG/DL (ref 8–22)
CALCIUM SERPL-MCNC: 9 MG/DL (ref 8.5–10.5)
CHLORIDE SERPL-SCNC: 102 MMOL/L (ref 96–112)
CHOLEST SERPL-MCNC: 159 MG/DL (ref 100–199)
CO2 SERPL-SCNC: 23 MMOL/L (ref 20–33)
CREAT SERPL-MCNC: 0.82 MG/DL (ref 0.5–1.4)
FASTING STATUS PATIENT QL REPORTED: NORMAL
GLOBULIN SER CALC-MCNC: 2.6 G/DL (ref 1.9–3.5)
GLUCOSE SERPL-MCNC: 278 MG/DL (ref 65–99)
HDLC SERPL-MCNC: 37 MG/DL
LDLC SERPL CALC-MCNC: 68 MG/DL
POTASSIUM SERPL-SCNC: 4.1 MMOL/L (ref 3.6–5.5)
PROT SERPL-MCNC: 6.4 G/DL (ref 6–8.2)
SODIUM SERPL-SCNC: 137 MMOL/L (ref 135–145)
TRIGL SERPL-MCNC: 268 MG/DL (ref 0–149)

## 2020-10-02 PROCEDURE — 83036 HEMOGLOBIN GLYCOSYLATED A1C: CPT

## 2020-10-02 PROCEDURE — 36415 COLL VENOUS BLD VENIPUNCTURE: CPT

## 2020-10-02 PROCEDURE — 80061 LIPID PANEL: CPT

## 2020-10-02 PROCEDURE — 80053 COMPREHEN METABOLIC PANEL: CPT

## 2020-10-03 LAB
EST. AVERAGE GLUCOSE BLD GHB EST-MCNC: 212 MG/DL
HBA1C MFR BLD: 9 % (ref 0–5.6)

## 2020-10-08 RX ORDER — AMITRIPTYLINE HYDROCHLORIDE 50 MG/1
TABLET, FILM COATED ORAL
Qty: 90 TAB | Refills: 3 | Status: SHIPPED | OUTPATIENT
Start: 2020-10-08 | End: 2021-12-07

## 2020-10-08 NOTE — TELEPHONE ENCOUNTER
Received request via: Pharmacy    Was the patient seen in the last year in this department? Yes    Does the patient have an active prescription (recently filled or refills available) for medication(s) requested? No     Last OV 8/4/2020

## 2020-10-09 ENCOUNTER — OFFICE VISIT (OUTPATIENT)
Dept: MEDICAL GROUP | Facility: PHYSICIAN GROUP | Age: 60
End: 2020-10-09
Payer: COMMERCIAL

## 2020-10-09 ENCOUNTER — TELEPHONE (OUTPATIENT)
Dept: VASCULAR LAB | Facility: MEDICAL CENTER | Age: 60
End: 2020-10-09

## 2020-10-09 VITALS
OXYGEN SATURATION: 97 % | HEIGHT: 74 IN | HEART RATE: 71 BPM | TEMPERATURE: 98.1 F | SYSTOLIC BLOOD PRESSURE: 126 MMHG | WEIGHT: 315 LBS | RESPIRATION RATE: 16 BRPM | BODY MASS INDEX: 40.43 KG/M2 | DIASTOLIC BLOOD PRESSURE: 86 MMHG

## 2020-10-09 DIAGNOSIS — E11.69 DIABETES MELLITUS TYPE 2 IN OBESE: ICD-10-CM

## 2020-10-09 DIAGNOSIS — R10.9 FLANK PAIN: ICD-10-CM

## 2020-10-09 DIAGNOSIS — D64.9 ANEMIA OF UNKNOWN ETIOLOGY: ICD-10-CM

## 2020-10-09 DIAGNOSIS — R41.3 MEMORY PROBLEM: ICD-10-CM

## 2020-10-09 DIAGNOSIS — E66.9 DIABETES MELLITUS TYPE 2 IN OBESE: ICD-10-CM

## 2020-10-09 DIAGNOSIS — Z12.11 COLON CANCER SCREENING: ICD-10-CM

## 2020-10-09 PROCEDURE — 99214 OFFICE O/P EST MOD 30 MIN: CPT | Performed by: FAMILY MEDICINE

## 2020-10-09 RX ORDER — OXYCODONE AND ACETAMINOPHEN 7.5; 325 MG/1; MG/1
TABLET ORAL
COMMUNITY
Start: 2020-09-22 | End: 2021-01-15

## 2020-10-09 ASSESSMENT — FIBROSIS 4 INDEX: FIB4 SCORE: 1.24

## 2020-10-09 NOTE — ASSESSMENT & PLAN NOTE
Patient has a strong family history of alzheimer's disease.   He feels less sharp than before and doesn't feel his thinking is as sharp as before.   I explained this is likely due to elevated blood sugars making him feel cloudy.   But due to strong family hx will start dementia work up: vit d, tsh ordered.

## 2020-10-09 NOTE — LETTER
[facility]  Ryann Street M.D.  [address] [place] [address]  Fax: [phone]   Authorization for Release/Disclosure of   Protected Health Information   Name: CAROL RIOJAS : 1960 SSN: [id number]   Address:  [facility] [address] Phone:    [phone] (home)    I authorize the entity listed below to release/disclose the PHI below to:   [facility]/Ryann Street M.D. and Ryann Street M.D.   Provider or Entity Name:  [facility]    Address   City, State, [facility] Phone:      Fax:     Reason for request: continuity of care   Information to be released:    [  ] LAST COLONOSCOPY,  including any PATH REPORT and follow-up  [  ] LAST FIT/COLOGUARD RESULT [  ] LAST DEXA  [  ] LAST MAMMOGRAM  [  ] LAST PAP  [  ] LAST LABS [ X ] RETINA EXAM REPORT  [  ] IMMUNIZATION RECORDS  [  ] Release all info      [  ] Check here and initial the line next to each item to release ALL health information INCLUDING  _____ Care and treatment for drug and / or alcohol abuse  _____ HIV testing, infection status, or AIDS  _____ Genetic Testing    DATES OF SERVICE OR TIME PERIOD TO BE DISCLOSED: _____________  I understand and acknowledge that:  * This Authorization may be revoked at any time by you in writing, except if your health information has already been used or disclosed.  * Your health information that will be used or disclosed as a result of you signing this authorization could be re-disclosed by the recipient. If this occurs, your re-disclosed health information may no longer be protected by State or Federal laws.  * You may refuse to sign this Authorization. Your refusal will not affect your ability to obtain treatment.  * This Authorization becomes effective upon signing and will  on (date) __________.      If no date is indicated, this Authorization will  one (1) year from the signature date.    Name: Carol Riojas    Signature:   Date:        10/9/2020       PLEASE FAX REQUESTED RECORDS BACK TO: [phone]

## 2020-10-09 NOTE — TELEPHONE ENCOUNTER
Left voicemail message for patient to return call to [facility] to establish care      GISSELLE Del Cid, Clinical Pharmacist, CDE, CACP

## 2020-10-09 NOTE — PROGRESS NOTES
Subjective:   Jamey Riojas is a 60 y.o. male here today for evaluation and management of:     Diabetes mellitus type 2 in obese  Uncontrolled, improved slightly from 9.9 to 9.0  He is on NPH bid, actos, metformin, has stopped ozempic/ januvia/tradjenta in the past due to cost.   Will retry januvia to see if insurance coverage is different  DM foot exam abnormal today: has callus on right foot, decreased sensation to monofilament b/l  Had retina screening done. Records requested.   Normal GFR and LDL and normal urine microalbumin  advsied to adjust his NPH higher gradually by 1-2 units every 3 days till blood sugars are in 120-140 range.     Memory problem  Patient has a strong family history of alzheimer's disease.   He feels less sharp than before and doesn't feel his thinking is as sharp as before.   I explained this is likely due to elevated blood sugars making him feel cloudy.   But due to strong family hx will start dementia work up: vit d, tsh ordered.       Flank pain  Pt was told that screws in thoracic spine placed at prior surgery were loose. This is causing irritation to his bones and nerves and he has surgery planned to correct this by Dr. Shearer once pt is ready to get this done.            Current medicines (including changes today)  Current Outpatient Medications   Medication Sig Dispense Refill   • SITagliptin (JANUVIA) 100 MG Tab Take 1 Tab by mouth every day. 30 Tab 11   • amitriptyline (ELAVIL) 50 MG Tab TAKE 1 TABLET BY MOUTH AT BEDTIME 90 Tab 3   • pioglitazone (ACTOS) 30 MG Tab Take 1 Tab by mouth every day. 90 Tab 3   • zolpidem (AMBIEN) 10 MG Tab TAKE 1 TABLET BY MOUTH ONCE DAILY AT BEDTIME AS NEEDED FOR SLEEP FOR 30 DAYS 90 Each 1   • gabapentin (NEURONTIN) 300 MG Cap TAKE 3 CAPSULES BY MOUTH THREE TIMES DAILY 810 Cap 3   • amLODIPine (NORVASC) 10 MG Tab Take 1 tablet by mouth once daily 90 Tab 1   • tamsulosin (FLOMAX) 0.4 MG capsule Take 2 capsules by mouth once daily 180 Cap 1   •  metformin (GLUCOPHAGE) 1000 MG tablet TAKE 1 TABLET BY MOUTH TWICE DAILY WITH MEALS 180 Tab 1   • celecoxib (CELEBREX) 200 MG Cap Take 1 capsule by mouth twice daily 180 Cap 1   • sertraline (ZOLOFT) 50 MG Tab Take 1 tablet by mouth once daily 90 Tab 1   • finasteride (PROSCAR) 5 MG Tab Take 1 tablet by mouth once daily 90 Tab 1   • losartan (COZAAR) 100 MG Tab Take 1 tablet by mouth once daily 90 Each 1   • insulin NPH (NOVOLIN N RELION) 100 UNIT/ML Suspension Inject 15 Units as instructed 2 times a day. 10 mL 11   • Omega-3 Fatty Acids (FISH OIL PO) Take  by mouth every day.     • FIBER PO Take 2 Caps by mouth every day. Gummy     • traZODone (DESYREL) 100 MG Tab TAKE 1 TO 2 TABLETS BY MOUTH ONCE DAILY AT BEDTIME IF  NEEDED  FOR  SLEEP 180 Tab 3   • Misc. Devices Misc One touch ultra test strips. Patient has DM type 2 and tests twice per day 100 Each 3   • Misc. Devices Misc One touch ultra lancets; patient has diabetes mellitus type 2 and test once per day 100 Device 3   • tizanidine (ZANAFLEX) 4 MG Tab Take 1 Tab by mouth every 6 hours as needed (back pain). (Patient taking differently: Take 4 mg by mouth every day.) 270 Tab 3   • omeprazole (PRILOSEC) 20 MG delayed-release capsule TAKE ONE CAPSULE BY MOUTH ONCE DAILY 90 Cap 3   • Misc. Devices Misc CPAP supplies; fax to [facility] [phone]. 100 Device 3   • fluticasone (FLONASE) 50 MCG/ACT nasal spray Spray 2 Sprays in nose every day. Each Nostril 1 Bottle 3   • albuterol (VENTOLIN OR PROVENTIL) 108 (90 BASE) MCG/ACT AERS inhalation aerosol Inhale 2 Puffs by mouth every 6 hours as needed for Shortness of Breath. 8.5 g 3   • Multiple Vitamins-Minerals (MULTIVITAMIN PO) Take 1 Tab by mouth every day.       • Fexofenadine HCl (ALLEGRA PO) Take  by mouth as needed.     • Cholecalciferol (VITAMIN D) 2000 UNIT CAPS Take  by mouth every day.     • GLUCOSAMINE-CHONDROITIN-MSM PO Take 2 Tabs by mouth every day.     • aspirin 81 MG tablet Take 81 mg by mouth every day.    "  • metoprolol SR (TOPROL XL) 25 MG TABLET SR 24 HR Take 1 tablet by mouth once daily 90 Tab 1   • oxyCODONE-acetaminophen (PERCOCET) 7.5-325 MG per tablet TAKE 1 TABLET BY MOUTH THREE TIMES DAILY AS NEEDED . DO NOT EXCEED 3 TABS PER 24 HOURS       No current facility-administered medications for this visit.      He  has a past medical history of Anesthesia, Anxiety, Arthritis, Body aches (11/19/2014), Bronchitis (2009), Chickenpox, Diabetes, Dysuria (7/30/2014), Enlarged liver, GERD (gastroesophageal reflux disease) (12/21/2011), Heart burn, Hypertension, Indigestion, Influenza, Insomnia (12/21/2011), Nephrolithiasis (7/30/2014), Obesity, BRAIN (obstructive sleep apnea), Pain (12-14-12), Psychiatric problem, Sleep apnea, and Snoring.    ROS  No chest pain, no shortness of breath, no abdominal pain       Objective:     /86   Pulse 71   Temp 36.7 °C (98.1 °F) (Temporal)   Resp 16   Ht 1.88 m (6' 2\")   Wt (!) 147 kg (324 lb)   SpO2 97%  Body mass index is 41.6 kg/m².   Physical Exam:  Constitutional: Alert, no distress.  Skin: Warm, dry, good turgor, no rashes in visible areas.  Eye: Equal, round and reactive, conjunctiva clear, lids normal.  Neck: Trachea midline  Respiratory: Unlabored respiratory effort  Psych: Alert and oriented x3, normal affect and mood.        Assessment and Plan:   The following treatment plan was discussed    1. Colon cancer screening  - COLOGUARD (FIT DNA)    2. Diabetes mellitus type 2 in obese (HCC)  Uncontrolled  Add januvia  - Diabetic Monofilament Lower Extremity Exam  - HEMOGLOBIN A1C; Future  - MICROALBUMIN CREAT RATIO URINE (LAB COLLECT); Future  - REFERRAL TO PHARMACOTHERAPY SERVICE    3. Memory problem  Check vit d, tsh    4. Flank pain  Follow up with neurology/ortho as planned.       Followup: Return in about 3 months (around 1/9/2021) for Friday DM.         "

## 2020-10-09 NOTE — ASSESSMENT & PLAN NOTE
Pt was told that screws in thoracic spine placed at prior surgery were loose. This is causing irritation to his bones and nerves and he has surgery planned to correct this by Dr. Shearer once pt is ready to get this done.

## 2020-10-09 NOTE — ASSESSMENT & PLAN NOTE
Uncontrolled, improved slightly from 9.9 to 9.0  He is on NPH bid, actos, metformin, has stopped ozempic/ januvia/tradjenta in the past due to cost.   Will retry januvia to see if insurance coverage is different  DM foot exam abnormal today: has callus on right foot, decreased sensation to monofilament b/l  Had retina screening done. Records requested.   Normal GFR and LDL and normal urine microalbumin  advsied to adjust his NPH higher gradually by 1-2 units every 3 days till blood sugars are in 120-140 range.

## 2020-10-13 DIAGNOSIS — I10 ESSENTIAL HYPERTENSION: ICD-10-CM

## 2020-10-13 RX ORDER — METOPROLOL SUCCINATE 25 MG/1
TABLET, EXTENDED RELEASE ORAL
Qty: 90 TAB | Refills: 1 | Status: SHIPPED | OUTPATIENT
Start: 2020-10-13 | End: 2021-03-17

## 2020-10-13 NOTE — TELEPHONE ENCOUNTER
Refill X 6 months, sent to pharmacy.Pt. Seen in the last 6 months per protocol.   Lab Results   Component Value Date/Time    SODIUM 137 10/02/2020 09:40 AM    POTASSIUM 4.1 10/02/2020 09:40 AM    CHLORIDE 102 10/02/2020 09:40 AM    CO2 23 10/02/2020 09:40 AM    GLUCOSE 278 (H) 10/02/2020 09:40 AM    BUN 16 10/02/2020 09:40 AM    CREATININE 0.82 10/02/2020 09:40 AM    CREATININE 0.8 09/10/2007 10:15 AM

## 2020-10-19 ENCOUNTER — TELEPHONE (OUTPATIENT)
Dept: VASCULAR LAB | Facility: MEDICAL CENTER | Age: 60
End: 2020-10-19

## 2020-10-27 ENCOUNTER — TELEPHONE (OUTPATIENT)
Dept: VASCULAR LAB | Facility: MEDICAL CENTER | Age: 60
End: 2020-10-27

## 2020-10-27 NOTE — TELEPHONE ENCOUNTER
Left voicemail message for patient to return call to [facility] to establish care      DM   tommie Del Cid, Clinical Pharmacist, CDE, CACP

## 2020-11-11 NOTE — TELEPHONE ENCOUNTER
Was the patient seen in the last year in this department? Yes    Does patient have an active prescription for medications requested? No     Received Request Via: Pharmacy      Pt met protocol?: Yes, OV last month   BP Readings from Last 1 Encounters:   10/09/20 126/86

## 2020-11-12 RX ORDER — LOSARTAN POTASSIUM 100 MG/1
100 TABLET ORAL DAILY
Qty: 90 EACH | Refills: 1 | Status: SHIPPED | OUTPATIENT
Start: 2020-11-12 | End: 2021-04-13

## 2020-11-13 DIAGNOSIS — M48.062 LUMBAR STENOSIS WITH NEUROGENIC CLAUDICATION: ICD-10-CM

## 2020-11-17 RX ORDER — CELECOXIB 200 MG/1
CAPSULE ORAL
Qty: 180 CAP | Refills: 1 | Status: SHIPPED | OUTPATIENT
Start: 2020-11-17 | End: 2021-06-02

## 2020-12-04 ENCOUNTER — OFFICE VISIT (OUTPATIENT)
Dept: MEDICAL GROUP | Facility: PHYSICIAN GROUP | Age: 60
End: 2020-12-04
Payer: COMMERCIAL

## 2020-12-04 DIAGNOSIS — E66.9 DIABETES MELLITUS TYPE 2 IN OBESE: ICD-10-CM

## 2020-12-04 DIAGNOSIS — E11.69 DIABETES MELLITUS TYPE 2 IN OBESE: ICD-10-CM

## 2020-12-04 PROCEDURE — 99999 PR NO CHARGE: CPT | Performed by: FAMILY MEDICINE

## 2020-12-04 PROCEDURE — 99403 PREV MED CNSL INDIV APPRX 45: CPT | Performed by: FAMILY MEDICINE

## 2020-12-04 RX ORDER — ATORVASTATIN CALCIUM 20 MG/1
20 TABLET, FILM COATED ORAL DAILY
Qty: 90 TAB | Refills: 3 | Status: SHIPPED | OUTPATIENT
Start: 2020-12-04 | End: 2021-12-07

## 2020-12-04 RX ORDER — DULAGLUTIDE 0.75 MG/.5ML
0.5 INJECTION, SOLUTION SUBCUTANEOUS
Qty: 2 ML | Refills: 0 | Status: SHIPPED | OUTPATIENT
Start: 2020-12-04 | End: 2021-01-22

## 2020-12-04 NOTE — NON-PROVIDER
Patient Consult Note    TIME IN: 12:58 pm  TIME OUT: 1:53 pm    Primary care physician: Ryann Street M.D.    Reason for consult: Management of Uncontrolled Type 2 Diabetes    HPI:  · Jamey Riojas is a 60 y.o. old patient who comes in today for evaluation of above stated problem.  · Pt presents with his wife and two dogs in clinic today. They are both very pleasant and use walkers. Pt states he has had DM since 1997. He currently works at [employer] but is transitioning to disability soon. Pt's insurance coverage has been a major barrier DM therapy.    Most Recent HbA1c:   Lab Results   Component Value Date/Time    HBA1C 9.0 (H) 10/02/2020 09:40 AM      Lab Results   Component Value Date/Time    CREATININE 0.82 10/02/2020 09:40 AM    CREATININE 0.8 09/10/2007 10:15 AM        Diabetes Medication History and Current Regimen  Metformin: Metformin 1000 mg BID   GLP-1 Agent: Pt had trial of Ozempic - was too costly    Thiazolidinedione: Pioglitazone 30 mg once daily  DPP-4 Inhibitor: Sitagliptin 100 mg once daily - Not taking 2/2 cost    Insulin: NPH 30 units BID - pt self-titrates per Dr. Street's recommendations    Pt has home glucometer and proper testing technique - Yes    Pt reports blood sugars: (Discussed goals: FBG , 2h PP <180)  Before Breakfast: 200's, occasional 140 or 150 - pt states this is 2/2 to not snacking at night    Hypoglycemia awareness - Yes  Nocturnal hypoglycemia- Denies  Hypoglycemia:  None    Pt's treatment of Hypoglycemia - Counseled  - 15:15 Rule    Current Exercise - None. Pt states that he has back issues - he is going on disability 2/2 to this  Exercise Goal - Increase as tolerated. Will aim for more after back surgery.    Dietary:  Breakfast - Slim fast  Lunch/Dinner - Nathaniel in the box, mcdonalds, taco bell  Snack - Apples, chips, crackers, (anything starchy/salty)  Pt's wife states that his major problem is the portion sizes.    Foot Exam:  Monofilament exam -  10/2020    Preventative Management  BP regimen (ACE/ARB) - Losartan 100 mg once daily  ASA - 81 mg once daily  Statin - None at this time  Last Retinal Scan - 2/2020. Pt does annually. Noted improvement upon last visit  Last Foot Exam - 10/2020  Last A1c - See below  Lab Results   Component Value Date/Time    HBA1C 9.0 (H) 10/02/2020 09:40 AM      Last Microalbuminuria - 0.7 (8/2019)    Past Medical History:  Patient Active Problem List    Diagnosis Date Noted   • Degeneration of cervical intervertebral disc 11/30/2012     Priority: High   • Obstructive sleep apnea 12/02/2012     Priority: Medium   • Iron deficiency anemia 12/02/2012     Priority: Medium   • HTN (hypertension) 12/21/2011     Priority: Medium   • Major depression 12/02/2012     Priority: Low   • Vitamin D deficiency disease 01/12/2012     Priority: Low   • Memory problem 10/09/2020   • Wound, open, forehead 02/21/2020   • History of colon polyps 11/13/2019   • Flank pain 09/12/2019   • Left lower quadrant pain 08/15/2019   • Dyslipidemia 07/26/2018   • Morbid obesity with BMI of 40.0-44.9, adult ([facility]) 05/10/2018   • H/O colonoscopy with polypectomy 08/17/2017   • Chronic use of opiate drug for therapeutic purpose 08/11/2016   • Lumbar stenosis with neurogenic claudication 12/31/2015   • Anemia of unknown etiology 05/21/2015   • Sinusitis 04/21/2015   • Body aches 11/19/2014   • Nephrolithiasis 07/30/2014   • BPH (benign prostatic hyperplasia) 12/20/2013   • Degeneration of lumbar or lumbosacral intervertebral disc 12/30/2012   • Chronic pain of left knee 09/18/2012   • Diabetes mellitus type 2 in obese ([facility]) 12/21/2011   • Insomnia 12/21/2011   • GERD (gastroesophageal reflux disease) 12/21/2011   • Chronic back pain 12/21/2011       Past Surgical History:  Past Surgical History:   Procedure Laterality Date   • COLONOSCOPY  11/13/2019    Procedure: COLONOSCOPY;  Surgeon: Buddy Diaz M.D.;  Location: SURGERY [facility];  Service:  Gastroenterology   • LUMBAR FUSION POSTERIOR  12/30/2012    Performed by Benjamin Kumar M.D. at SURGERY [facility]   • CERVICAL DISK AND FUSION ANTERIOR  11/30/2012    Performed by Benjamin Kumar M.D. at SURGERY [facility]   • HARDWARE REMOVAL ORTHO  11/30/2012    Performed by Benjamin Kumar M.D. at SURGERY [facility]   • KNEE ARTHROSCOPY  9/18/2012    Performed by James Francis M.D. at SURGERY [facility]   • MEDIAL MENISCECTOMY  9/18/2012    Performed by James Francis M.D. at SURGERY [facility]   • DEBRIDEMENT  9/18/2012    Performed by James Francis M.D. at SURGERY [facility]   • LUMBAR FUSION POSTERIOR  6/18/2010    Performed by BENJAMIN KUMAR at SURGERY [facility]   • LUMBAR DECOMPRESSION  6/18/2010    Performed by BENJAMIN KUMAR at SURGERY [facility]   • KNEE ARTHROSCOPY  10/20/2009    Performed by JAMES FRANCIS at SURGERY [facility]   • MEDIAL MENISCECTOMY  10/20/2009    Performed by JAMES FRANCIS at SURGERY [facility]   • CERVICAL FUSION POSTERIOR  9/2007    c -3-4-5    • CHOLECYSTECTOMY  1984    open   • FOOT SURGERY      left  Farrar's neuroma and heel spur       Allergies:  Penicillins, Phenergan [promethazine hcl], and Influenza virus vacc    Social History:  Social History     Socioeconomic History   • Marital status:      Spouse name: Not on file   • Number of children: Not on file   • Years of education: Not on file   • Highest education level: Not on file   Occupational History   • Not on file   Social Needs   • Financial resource strain: Not on file   • Food insecurity     Worry: Not on file     Inability: Not on file   • Transportation needs     Medical: Not on file     Non-medical: Not on file   Tobacco Use   • Smoking status: Never Smoker   • Smokeless tobacco: Never Used   Substance and Sexual Activity   • Alcohol use: No     Alcohol/week: 0.0 oz   • Drug use: No   • Sexual activity: Yes     Partners: Female    Lifestyle   • Physical activity     Days per week: Not on file     Minutes per session: Not on file   • Stress: Not on file   Relationships   • Social connections     Talks on phone: Not on file     Gets together: Not on file     Attends Synagogue service: Not on file     Active member of club or organization: Not on file     Attends meetings of clubs or organizations: Not on file     Relationship status: Not on file   • Intimate partner violence     Fear of current or ex partner: Not on file     Emotionally abused: Not on file     Physically abused: Not on file     Forced sexual activity: Not on file   Other Topics Concern   •  Service No   • Blood Transfusions Yes   • Caffeine Concern No   • Occupational Exposure No   • Hobby Hazards No   • Sleep Concern Yes   • Stress Concern Yes   • Weight Concern Yes   • Special Diet No   • Back Care No   • Exercise No   • Bike Helmet No   • Seat Belt Yes   • Self-Exams Yes   Social History Narrative   • Not on file       Family History:  Family History   Problem Relation Age of Onset   • Other Mother         osteopenia   • Hypertension Mother    • Psychiatric Illness Mother    • Other Father         brain tumor   • Heart Attack Maternal Grandfather    • Sleep Apnea Neg Hx        Medications:    Current Outpatient Medications:   •  Dulaglutide (TRULICITY) 0.75 MG/0.5ML Solution Pen-injector, Inject 0.5 mL under the skin every 7 days., Disp: 2 mL, Rfl: 0  •  celecoxib (CELEBREX) 200 MG Cap, Take 1 capsule by mouth twice daily, Disp: 180 Cap, Rfl: 1  •  losartan (COZAAR) 100 MG Tab, Take 1 Tab by mouth every day., Disp: 90 Each, Rfl: 1  •  metoprolol SR (TOPROL XL) 25 MG TABLET SR 24 HR, Take 1 tablet by mouth once daily, Disp: 90 Tab, Rfl: 1  •  oxyCODONE-acetaminophen (PERCOCET) 7.5-325 MG per tablet, TAKE 1 TABLET BY MOUTH THREE TIMES DAILY AS NEEDED . DO NOT EXCEED 3 TABS PER 24 HOURS, Disp: , Rfl:   •  amitriptyline (ELAVIL) 50 MG Tab, TAKE 1 TABLET BY MOUTH AT  BEDTIME, Disp: 90 Tab, Rfl: 3  •  pioglitazone (ACTOS) 30 MG Tab, Take 1 Tab by mouth every day., Disp: 90 Tab, Rfl: 3  •  zolpidem (AMBIEN) 10 MG Tab, TAKE 1 TABLET BY MOUTH ONCE DAILY AT BEDTIME AS NEEDED FOR SLEEP FOR 30 DAYS, Disp: 90 Each, Rfl: 1  •  gabapentin (NEURONTIN) 300 MG Cap, TAKE 3 CAPSULES BY MOUTH THREE TIMES DAILY, Disp: 810 Cap, Rfl: 3  •  amLODIPine (NORVASC) 10 MG Tab, Take 1 tablet by mouth once daily, Disp: 90 Tab, Rfl: 1  •  tamsulosin (FLOMAX) 0.4 MG capsule, Take 2 capsules by mouth once daily, Disp: 180 Cap, Rfl: 1  •  metformin (GLUCOPHAGE) 1000 MG tablet, TAKE 1 TABLET BY MOUTH TWICE DAILY WITH MEALS, Disp: 180 Tab, Rfl: 1  •  sertraline (ZOLOFT) 50 MG Tab, Take 1 tablet by mouth once daily, Disp: 90 Tab, Rfl: 1  •  finasteride (PROSCAR) 5 MG Tab, Take 1 tablet by mouth once daily, Disp: 90 Tab, Rfl: 1  •  insulin NPH (NOVOLIN N RELION) 100 UNIT/ML Suspension, Inject 15 Units as instructed 2 times a day. (Patient taking differently: Inject 30 Units under the skin 2 times a day.), Disp: 10 mL, Rfl: 11  •  Omega-3 Fatty Acids (FISH OIL PO), Take  by mouth every day., Disp: , Rfl:   •  FIBER PO, Take 2 Caps by mouth every day. Gummy, Disp: , Rfl:   •  traZODone (DESYREL) 100 MG Tab, TAKE 1 TO 2 TABLETS BY MOUTH ONCE DAILY AT BEDTIME IF  NEEDED  FOR  SLEEP, Disp: 180 Tab, Rfl: 3  •  Misc. Devices Misc, One touch ultra test strips. Patient has DM type 2 and tests twice per day, Disp: 100 Each, Rfl: 3  •  Misc. Devices Misc, One touch ultra lancets; patient has diabetes mellitus type 2 and test once per day, Disp: 100 Device, Rfl: 3  •  tizanidine (ZANAFLEX) 4 MG Tab, Take 1 Tab by mouth every 6 hours as needed (back pain). (Patient taking differently: Take 4 mg by mouth every day.), Disp: 270 Tab, Rfl: 3  •  omeprazole (PRILOSEC) 20 MG delayed-release capsule, TAKE ONE CAPSULE BY MOUTH ONCE DAILY, Disp: 90 Cap, Rfl: 3  •  Misc. Devices Misc, CPAP supplies; fax to [facility] [phone]., Disp: 100  Device, Rfl: 3  •  fluticasone (FLONASE) 50 MCG/ACT nasal spray, Spray 2 Sprays in nose every day. Each Nostril, Disp: 1 Bottle, Rfl: 3  •  albuterol (VENTOLIN OR PROVENTIL) 108 (90 BASE) MCG/ACT AERS inhalation aerosol, Inhale 2 Puffs by mouth every 6 hours as needed for Shortness of Breath., Disp: 8.5 g, Rfl: 3  •  Multiple Vitamins-Minerals (MULTIVITAMIN PO), Take 1 Tab by mouth every day.  , Disp: , Rfl:   •  Fexofenadine HCl (ALLEGRA PO), Take  by mouth as needed., Disp: , Rfl:   •  Cholecalciferol (VITAMIN D) 2000 UNIT CAPS, Take  by mouth every day., Disp: , Rfl:   •  GLUCOSAMINE-CHONDROITIN-MSM PO, Take 2 Tabs by mouth every day., Disp: , Rfl:   •  aspirin 81 MG tablet, Take 81 mg by mouth every day., Disp: , Rfl:     Labs: Reviewed    Physical Examination:  Vital signs: There were no vitals taken for this visit. There is no height or weight on file to calculate BMI.    Assessment and Plan:    1. DM2  · Basic physiology of DMII was explained to patient as well as microvascular/macrovascular complications. The importance of increasing physical activity to improve diabetes control was discussed with the patient. Patient was also educated on changing diet and making better choices to help control blood sugar.  · Pt's insurance coverage will be changing with his imminent disability - instructed pt and wife to look into [employer] to see about getting tax credit insurance.  · Pt currently tests FBG in the AM - counseled to continue to do so  · Pt's LDL is < 100 mg/dL - at goal. Given his age and dx of DM, he is a candidate for at least moderate intensity statin therapy. Recommend initiating atorvastatin 20 mg once daily.    - Medication changes   · Start Trulicity 0.75mg once weekly. Pt counseled extensively on MOA, SE, and administration. Suspect pt will benefit from both a weight loss (decreased portion sizes) and BG standpoint on this medication. Copay card application was filled out and activated card  info was emailed to pt for copay assistance.  · Decrease NPH to 20 units BID. Pt counseled to continue titrating as per Dr. Street's instruction. He is to call if he has BG < 70 mg/dL  · Pt to DC pursuance of Januvia as we are starting GLP1 therapy.  · Continue all other DM medications at this time    - Lifestyle changes   · Diet: Pt has significant room for improvement regarding diet - both portion sizes and CHO intake. Pt counseled extensively on dietary changes (decreasing CHO to no more than a quarter of his plate and using alternative/keto options such as squash, zucchini, cauliflower, etc.)  · Exercise: Pt has significant back pain that he will be having surgery for to hopefully correct. As of now, he is unable to do much in the way of physical activity - he was instructed to increase as tolerated.    FU: 2 weeks to assess Trulicity use/affordability, check BG, f/u on insurance tax credit      Garry Evans PharmD  12/04/20    CC:   Ryann Street M.D.  Gilberto Law, AndrewD

## 2020-12-04 NOTE — Clinical Note
Hi Dr. Street,  Pt's LDL is < 100 mg/dL - at goal. Given his age and dx of DM, he is a candidate for at least moderate intensity statin therapy. Recommend initiating atorvastatin 20 mg once daily. What are your thoughts?  Thanks,  Garry

## 2020-12-13 DIAGNOSIS — I10 ESSENTIAL HYPERTENSION: ICD-10-CM

## 2020-12-13 DIAGNOSIS — N40.1 BENIGN NODULAR PROSTATIC HYPERPLASIA WITH LOWER URINARY TRACT SYMPTOMS: ICD-10-CM

## 2020-12-13 DIAGNOSIS — F51.01 PRIMARY INSOMNIA: ICD-10-CM

## 2020-12-13 DIAGNOSIS — Z12.5 SCREENING FOR PROSTATE CANCER: ICD-10-CM

## 2020-12-13 DIAGNOSIS — F33.1 MODERATE EPISODE OF RECURRENT MAJOR DEPRESSIVE DISORDER (HCC): ICD-10-CM

## 2020-12-15 RX ORDER — TAMSULOSIN HYDROCHLORIDE 0.4 MG/1
CAPSULE ORAL
Qty: 180 CAP | Refills: 1 | Status: SHIPPED | OUTPATIENT
Start: 2020-12-15 | End: 2021-06-02

## 2020-12-15 RX ORDER — AMLODIPINE BESYLATE 10 MG/1
TABLET ORAL
Qty: 90 TAB | Refills: 1 | Status: SHIPPED | OUTPATIENT
Start: 2020-12-15 | End: 2021-06-02

## 2020-12-15 RX ORDER — TRAZODONE HYDROCHLORIDE 100 MG/1
TABLET ORAL
Qty: 180 TAB | Refills: 1 | Status: SHIPPED | OUTPATIENT
Start: 2020-12-15 | End: 2021-06-02

## 2020-12-15 RX ORDER — FINASTERIDE 5 MG/1
TABLET, FILM COATED ORAL
Qty: 90 TAB | Refills: 1 | Status: SHIPPED | OUTPATIENT
Start: 2020-12-15 | End: 2021-06-02

## 2020-12-15 NOTE — TELEPHONE ENCOUNTER
Patient has recently been seen by PCP within the last 6 months per protocol (12/20). Will refill medications for 6 months.  Lab Results   Component Value Date/Time    HBA1C 9.0 (H) 10/02/2020 09:40 AM      Lab Results   Component Value Date/Time    MALBCRT 3 08/16/2019 11:01 AM    MICROALBUR 0.7 08/16/2019 11:01 AM      Lab Results   Component Value Date/Time    ALKPHOSPHAT 131 (H) 10/02/2020 09:40 AM    ASTSGOT 21 10/02/2020 09:40 AM    ALTSGPT 30 10/02/2020 09:40 AM    TBILIRUBIN 0.7 10/02/2020 09:40 AM

## 2021-01-11 ENCOUNTER — HOSPITAL ENCOUNTER (OUTPATIENT)
Dept: LAB | Facility: MEDICAL CENTER | Age: 61
End: 2021-01-11
Attending: FAMILY MEDICINE
Payer: COMMERCIAL

## 2021-01-11 DIAGNOSIS — E66.9 DIABETES MELLITUS TYPE 2 IN OBESE: ICD-10-CM

## 2021-01-11 DIAGNOSIS — D64.9 ANEMIA OF UNKNOWN ETIOLOGY: ICD-10-CM

## 2021-01-11 DIAGNOSIS — R41.3 MEMORY PROBLEM: ICD-10-CM

## 2021-01-11 DIAGNOSIS — E11.69 DIABETES MELLITUS TYPE 2 IN OBESE: ICD-10-CM

## 2021-01-11 LAB
25(OH)D3 SERPL-MCNC: 43 NG/ML (ref 30–100)
ALBUMIN SERPL BCP-MCNC: 3.8 G/DL (ref 3.2–4.9)
ALBUMIN/GLOB SERPL: 1.2 G/DL
ALP SERPL-CCNC: 151 U/L (ref 30–99)
ALT SERPL-CCNC: 16 U/L (ref 2–50)
ANION GAP SERPL CALC-SCNC: 11 MMOL/L (ref 7–16)
ANISOCYTOSIS BLD QL SMEAR: ABNORMAL
AST SERPL-CCNC: 18 U/L (ref 12–45)
BASOPHILS # BLD AUTO: 0 % (ref 0–1.8)
BASOPHILS # BLD: 0 K/UL (ref 0–0.12)
BILIRUB SERPL-MCNC: 0.7 MG/DL (ref 0.1–1.5)
BUN SERPL-MCNC: 16 MG/DL (ref 8–22)
CALCIUM SERPL-MCNC: 9.4 MG/DL (ref 8.5–10.5)
CHLORIDE SERPL-SCNC: 104 MMOL/L (ref 96–112)
CO2 SERPL-SCNC: 25 MMOL/L (ref 20–33)
CREAT SERPL-MCNC: 0.72 MG/DL (ref 0.5–1.4)
EOSINOPHIL # BLD AUTO: 0.31 K/UL (ref 0–0.51)
EOSINOPHIL NFR BLD: 6 % (ref 0–6.9)
ERYTHROCYTE [DISTWIDTH] IN BLOOD BY AUTOMATED COUNT: 43.8 FL (ref 35.9–50)
EST. AVERAGE GLUCOSE BLD GHB EST-MCNC: 192 MG/DL
FERRITIN SERPL-MCNC: 30.1 NG/ML (ref 22–322)
GLOBULIN SER CALC-MCNC: 3.1 G/DL (ref 1.9–3.5)
GLUCOSE SERPL-MCNC: 136 MG/DL (ref 65–99)
HBA1C MFR BLD: 8.3 % (ref 0–5.6)
HCT VFR BLD AUTO: 34.7 % (ref 42–52)
HGB BLD-MCNC: 10.3 G/DL (ref 14–18)
IRON SATN MFR SERPL: 7 % (ref 15–55)
IRON SERPL-MCNC: 26 UG/DL (ref 50–180)
LYMPHOCYTES # BLD AUTO: 0.85 K/UL (ref 1–4.8)
LYMPHOCYTES NFR BLD: 16.4 % (ref 22–41)
MACROCYTES BLD QL SMEAR: ABNORMAL
MANUAL DIFF BLD: NORMAL
MCH RBC QN AUTO: 25.8 PG (ref 27–33)
MCHC RBC AUTO-ENTMCNC: 29.7 G/DL (ref 33.7–35.3)
MCV RBC AUTO: 87 FL (ref 81.4–97.8)
MICROCYTES BLD QL SMEAR: ABNORMAL
MONOCYTES # BLD AUTO: 0.14 K/UL (ref 0–0.85)
MONOCYTES NFR BLD AUTO: 2.6 % (ref 0–13.4)
MORPHOLOGY BLD-IMP: NORMAL
NEUTROPHILS # BLD AUTO: 3.9 K/UL (ref 1.82–7.42)
NEUTROPHILS NFR BLD: 75 % (ref 44–72)
NRBC # BLD AUTO: 0 K/UL
NRBC BLD-RTO: 0 /100 WBC
PLATELET # BLD AUTO: 263 K/UL (ref 164–446)
PLATELET BLD QL SMEAR: NORMAL
PMV BLD AUTO: 9.5 FL (ref 9–12.9)
POLYCHROMASIA BLD QL SMEAR: NORMAL
POTASSIUM SERPL-SCNC: 4.5 MMOL/L (ref 3.6–5.5)
PROT SERPL-MCNC: 6.9 G/DL (ref 6–8.2)
RBC # BLD AUTO: 3.99 M/UL (ref 4.7–6.1)
RBC BLD AUTO: PRESENT
SODIUM SERPL-SCNC: 140 MMOL/L (ref 135–145)
TIBC SERPL-MCNC: 373 UG/DL (ref 250–450)
TSH SERPL DL<=0.005 MIU/L-ACNC: 2.32 UIU/ML (ref 0.38–5.33)
UIBC SERPL-MCNC: 347 UG/DL (ref 110–370)
VIT B12 SERPL-MCNC: 654 PG/ML (ref 211–911)
WBC # BLD AUTO: 5.2 K/UL (ref 4.8–10.8)

## 2021-01-11 PROCEDURE — 83550 IRON BINDING TEST: CPT

## 2021-01-11 PROCEDURE — 84443 ASSAY THYROID STIM HORMONE: CPT

## 2021-01-11 PROCEDURE — 85027 COMPLETE CBC AUTOMATED: CPT

## 2021-01-11 PROCEDURE — 82306 VITAMIN D 25 HYDROXY: CPT

## 2021-01-11 PROCEDURE — 85007 BL SMEAR W/DIFF WBC COUNT: CPT

## 2021-01-11 PROCEDURE — 36415 COLL VENOUS BLD VENIPUNCTURE: CPT

## 2021-01-11 PROCEDURE — 80053 COMPREHEN METABOLIC PANEL: CPT

## 2021-01-11 PROCEDURE — 82607 VITAMIN B-12: CPT

## 2021-01-11 PROCEDURE — 83540 ASSAY OF IRON: CPT

## 2021-01-11 PROCEDURE — 83036 HEMOGLOBIN GLYCOSYLATED A1C: CPT

## 2021-01-11 PROCEDURE — 82728 ASSAY OF FERRITIN: CPT

## 2021-01-12 NOTE — RESULT ENCOUNTER NOTE
Jamey,  Your A1c continues to improve!  But I'm worried about your anemia and low iron. Will evaluate at your 1/15 visit with me at 1.30 pm. Remember to come a little early to check in.   All other labs look great!!  Ryann Street M.D.

## 2021-01-15 ENCOUNTER — TELEMEDICINE (OUTPATIENT)
Dept: MEDICAL GROUP | Facility: PHYSICIAN GROUP | Age: 61
End: 2021-01-15
Payer: COMMERCIAL

## 2021-01-15 VITALS — WEIGHT: 315 LBS | HEIGHT: 74 IN | BODY MASS INDEX: 40.43 KG/M2

## 2021-01-15 DIAGNOSIS — Z12.11 COLON CANCER SCREENING: ICD-10-CM

## 2021-01-15 DIAGNOSIS — Z98.890 H/O CERVICAL SPINE SURGERY: ICD-10-CM

## 2021-01-15 DIAGNOSIS — D64.9 ANEMIA OF UNKNOWN ETIOLOGY: ICD-10-CM

## 2021-01-15 DIAGNOSIS — M48.062 LUMBAR STENOSIS WITH NEUROGENIC CLAUDICATION: ICD-10-CM

## 2021-01-15 DIAGNOSIS — E61.1 IRON DEFICIENCY: ICD-10-CM

## 2021-01-15 DIAGNOSIS — E66.9 DIABETES MELLITUS TYPE 2 IN OBESE: ICD-10-CM

## 2021-01-15 DIAGNOSIS — E11.69 DIABETES MELLITUS TYPE 2 IN OBESE: ICD-10-CM

## 2021-01-15 PROBLEM — S01.80XA: Status: RESOLVED | Noted: 2020-02-21 | Resolved: 2021-01-15

## 2021-01-15 PROCEDURE — 99214 OFFICE O/P EST MOD 30 MIN: CPT | Mod: 95,CR | Performed by: FAMILY MEDICINE

## 2021-01-15 RX ORDER — LANOLIN ALCOHOL/MO/W.PET/CERES
325 CREAM (GRAM) TOPICAL DAILY
Qty: 90 TAB | Refills: 1 | Status: SHIPPED | OUTPATIENT
Start: 2021-01-15 | End: 2021-07-15 | Stop reason: SDUPTHER

## 2021-01-15 RX ORDER — OXYCODONE AND ACETAMINOPHEN 10; 325 MG/1; MG/1
TABLET ORAL
COMMUNITY
Start: 2020-12-24

## 2021-01-15 ASSESSMENT — PATIENT HEALTH QUESTIONNAIRE - PHQ9
8. MOVING OR SPEAKING SO SLOWLY THAT OTHER PEOPLE COULD HAVE NOTICED. OR THE OPPOSITE, BEING SO FIGETY OR RESTLESS THAT YOU HAVE BEEN MOVING AROUND A LOT MORE THAN USUAL: NOT AT ALL
3. TROUBLE FALLING OR STAYING ASLEEP OR SLEEPING TOO MUCH: NOT AT ALL
SUM OF ALL RESPONSES TO PHQ9 QUESTIONS 1 AND 2: 2
SUM OF ALL RESPONSES TO PHQ QUESTIONS 1-9: 4
1. LITTLE INTEREST OR PLEASURE IN DOING THINGS: SEVERAL DAYS
5. POOR APPETITE OR OVEREATING: NOT AT ALL
2. FEELING DOWN, DEPRESSED, IRRITABLE, OR HOPELESS: SEVERAL DAYS
4. FEELING TIRED OR HAVING LITTLE ENERGY: SEVERAL DAYS
9. THOUGHTS THAT YOU WOULD BE BETTER OFF DEAD, OR OF HURTING YOURSELF: NOT AT ALL
6. FEELING BAD ABOUT YOURSELF - OR THAT YOU ARE A FAILURE OR HAVE LET YOURSELF OR YOUR FAMILY DOWN: NOT AL ALL
7. TROUBLE CONCENTRATING ON THINGS, SUCH AS READING THE NEWSPAPER OR WATCHING TELEVISION: SEVERAL DAYS

## 2021-01-15 ASSESSMENT — FIBROSIS 4 INDEX: FIB4 SCORE: 0.99

## 2021-01-15 NOTE — PROGRESS NOTES
Telephone Appointment Visit   As a means of avoiding spread of COVID-19, this visit is being conducted by telephone. This telephone visit was initiated by the patient and they verbally consented.    Time at start of call: 1.41pm    Reason for Call:  Lab Follow-up    HPI:    Diabetes mellitus type 2 in obese  Chronic condition, improving     Ref. Range 11/14/2019 09:05 10/2/2020 09:40 10/2/2020 09:55 1/11/2021 13:13   Glycohemoglobin Latest Ref Range: 0.0 - 5.6 % 9.9 (H) 9.0 (H)  8.3 (H)     LDL 68 on atorvastatin 20mg  Takes metformin 1000bid, losartan 100mg, trulicity every 7 days, asa 81 mg    Anemia of unknown etiology  Chronic condition, neg FIT 2019     Ref. Range 9/7/2017 12:00 4/30/2019 08:40   Occult Blood, IA Latest Ref Range: Negative  Negative Negative   Low iron  Results for CAROL SARAVIA (MRN [id number]) as of 1/15/2021 13:31   Ref. Range 1/11/2021 13:13   Iron Latest Ref Range: 50 - 180 ug/dL 26 (L)     Sigmoid diverticulosis and diffuse hepatic steatosis on CT abd pelvis in 2019.     cologuard ordered.   Start ferrous sulfate 1-3 times a day as tolerated.       H/O cervical spine surgery  Patient had a surgery done by his neurosurgeon in 2020.   He first had a problem with pain in upper left shoulder blade nerve pain radiating from the spine, this has eased up considerably.     He had a pinched nerve causing left side of his head to be numb which is also slowly resolving.     Lumbar stenosis with neurogenic claudication  He had hx of laminectomy in the past in 2017 and had a lumbar spine surgery 2020 by same specialist 2020. His back is not bothering him at all.   His side pain is considerably less than it was in the past.        Labs / Images Reviewed:        Assessment and Plan:     1. Diabetes mellitus type 2 in obese (HCC)  - MICROALBUMIN CREAT RATIO URINE; Future    2. Anemia of unknown etiology    3. Colon cancer screening    4. H/O cervical spine surgery    5. Lumbar stenosis with  neurogenic claudication    6. Iron deficiency  - IRON/TOTAL IRON BIND; Future  - FERRITIN; Future  - HEMOGLOBIN A1C; Future  - ferrous sulfate 325 (65 Fe) MG EC tablet; Take 1 Tab by mouth every day.  Dispense: 90 Tab; Refill: 1  - CBC WITHOUT DIFFERENTIAL; Future    Other orders  - oxyCODONE-acetaminophen (PERCOCET-10)  MG Tab; TAKE 1 2 TO 1 (ONE HALF TO ONE) TABLET BY MOUTH EVERY 4 HOURS FOR 10 DAYS AS NEEDED FOR PAIN (MODERATE 4 6)  - Obtain Results: Other (see comment) (spine surgery notes); Obtain Results From: Other (see comment)      Follow-up: 3 months    Time at end of call: 155pm  Total Time Spent: 11-20 minutes    Ryann Street M.D.

## 2021-01-15 NOTE — ASSESSMENT & PLAN NOTE
He had hx of laminectomy in the past in 2017 and had a lumbar spine surgery 2020 by same specialist 2020. His back is not bothering him at all.   His side pain is considerably less than it was in the past.

## 2021-01-15 NOTE — LETTER
[facility]  Ryann Street M.D.  [address] [place] [address]  Fax: [phone]   Authorization for Release/Disclosure of   Protected Health Information   Name: CAROL RIOJAS : 1960 SSN: [id number]   Address:  [facility] [address] Phone:    [phone] (home)    I authorize the entity listed below to release/disclose the PHI below to:   [facility]/Ryann Street M.D. and Ryann Street M.D.   Provider or Entity Name:  Dr. Mohsen Zafar [facility]   Address   City, State, [facility]   Phone:      Fax:     Reason for request: continuity of care   Information to be released:    [  ] LAST COLONOSCOPY,  including any PATH REPORT and follow-up  [  ] LAST FIT/COLOGUARD RESULT [  ] LAST DEXA  [  ] LAST MAMMOGRAM  [  ] LAST PAP  [  ] LAST LABS [  ] RETINA EXAM REPORT  [  ] IMMUNIZATION RECORDS  [ X ] Release all info      [  ] Check here and initial the line next to each item to release ALL health information INCLUDING  _____ Care and treatment for drug and / or alcohol abuse  _____ HIV testing, infection status, or AIDS  _____ Genetic Testing    DATES OF SERVICE OR TIME PERIOD TO BE DISCLOSED: _____________  I understand and acknowledge that:  * This Authorization may be revoked at any time by you in writing, except if your health information has already been used or disclosed.  * Your health information that will be used or disclosed as a result of you signing this authorization could be re-disclosed by the recipient. If this occurs, your re-disclosed health information may no longer be protected by State or Federal laws.  * You may refuse to sign this Authorization. Your refusal will not affect your ability to obtain treatment.  * This Authorization becomes effective upon signing and will  on (date) __________.      If no date is indicated, this Authorization will  one (1) year from the signature date.    Name: Carol Riojas    Signature:   Date:     1/15/2021       PLEASE FAX REQUESTED RECORDS BACK TO: [phone]

## 2021-01-15 NOTE — ASSESSMENT & PLAN NOTE
Chronic condition, improving     Ref. Range 11/14/2019 09:05 10/2/2020 09:40 10/2/2020 09:55 1/11/2021 13:13   Glycohemoglobin Latest Ref Range: 0.0 - 5.6 % 9.9 (H) 9.0 (H)  8.3 (H)     LDL 68 on atorvastatin 20mg  Takes metformin 1000bid, losartan 100mg, trulicity every 7 days, asa 81 mg

## 2021-01-15 NOTE — ASSESSMENT & PLAN NOTE
Patient had a surgery done by his neurosurgeon in 2020.   He first had a problem with pain in upper left shoulder blade nerve pain radiating from the spine, this has eased up considerably.     He had a pinched nerve causing left side of his head to be numb which is also slowly resolving.

## 2021-01-15 NOTE — ASSESSMENT & PLAN NOTE
Chronic condition, neg FIT 2019     Ref. Range 9/7/2017 12:00 4/30/2019 08:40   Occult Blood, IA Latest Ref Range: Negative  Negative Negative   Low iron  Results for CAROL SARAVIA (MRN [id number]) as of 1/15/2021 13:31   Ref. Range 1/11/2021 13:13   Iron Latest Ref Range: 50 - 180 ug/dL 26 (L)     Sigmoid diverticulosis and diffuse hepatic steatosis on CT abd pelvis in 2019.     cologuard ordered.   Start ferrous sulfate 1-3 times a day as tolerated.

## 2021-01-22 ENCOUNTER — NON-PROVIDER VISIT (OUTPATIENT)
Dept: MEDICAL GROUP | Facility: PHYSICIAN GROUP | Age: 61
End: 2021-01-22
Payer: COMMERCIAL

## 2021-01-22 DIAGNOSIS — E66.9 DIABETES MELLITUS TYPE 2 IN OBESE: ICD-10-CM

## 2021-01-22 DIAGNOSIS — E11.69 DIABETES MELLITUS TYPE 2 IN OBESE: ICD-10-CM

## 2021-01-22 PROCEDURE — 99402 PREV MED CNSL INDIV APPRX 30: CPT | Performed by: FAMILY MEDICINE

## 2021-01-22 RX ORDER — DULAGLUTIDE 1.5 MG/.5ML
0.5 INJECTION, SOLUTION SUBCUTANEOUS
Qty: 2 ML | Refills: 3 | Status: SHIPPED | OUTPATIENT
Start: 2021-01-22 | End: 2021-02-26 | Stop reason: SDUPTHER

## 2021-01-22 NOTE — NON-PROVIDER
Patient Consult Note    TIME IN: 2:30 pm  TIME OUT: 3:05 pm    Primary care physician: Ryann Street M.D.    Reason for consult: Management of Uncontrolled Type 2 Diabetes    HPI:  Jamey Riojas is a 60 y.o. old patient who comes in today for evaluation of above stated problem.    Pt presents w/ his wife and two dogs again today in clinic. They are both very pleasant and use walkers. Pt states he has had DM since 1997. He currently works at [employer] but is transitioning to disability soon.     At his last appt, pt was instructed to start Trulicity 0.75 mg once weekly. Pt states that he feels that the medication is helping him significantly.    All quality measures are up to date.    Most Recent HbA1c:   Lab Results   Component Value Date/Time    HBA1C 8.3 (H) 01/11/2021 01:13 PM      Lab Results   Component Value Date/Time    CREATININE 0.72 01/11/2021 01:13 PM    CREATININE 0.8 09/10/2007 10:15 AM     Diabetes Medication History and Current Regimen  Metformin: Metformin 1000 mg BID   GLP-1 Agent: Dulaglutide 0.75 mg once weekly   Thiazolidinedione: Pioglitazone 30 mg once daily    Basal Insulin: NPH 15 units QAM and QPM      Pt has home glucometer and proper testing technique - Yes    Pt reports blood sugars: (Discussed goals: FBG , 2h PP <180)  Before Breakfast: 115 this AM, 105, 120's    Hypoglycemia awareness - Yes  Nocturnal hypoglycemia- Denies  Hypoglycemia:  None     Pt's treatment of Hypoglycemia - Counseled  - 15:15 Rule    Current Exercise - Pt states he walks around his house (5-10 mins most days) - Pt recently had back surgery. Pt still waiting on disability to go through.   Exercise Goal - Increase as tolerated    Dietary - Pt states that he is eating less since his back surgery (procedures were in mid December). Frozen dinners, chicken pot pie, apples/oranges, jello, berries, yogurt, slim fast, occasional potato chips (decreased from previous habit of eating a whole)    Foot  Exam:  Monofilament exam - 10/2020    Preventative Management  BP regimen (ACE/ARB) - Losartan 100 mg once daily  ASA - 81 mg once daily  Statin - None at this time  Last Retinal Scan - 2/2020. Pt does annually. Pt to schedule prior to next apt  Last Foot Exam - Pt checks daily as well as providers at visits  Last A1c - See below  Lab Results   Component Value Date/Time    HBA1C 8.3 (H) 01/11/2021 01:13 PM      Last Microalbuminuria - 0.7 (8/2019)    Past Medical History:  Patient Active Problem List    Diagnosis Date Noted   • Degeneration of cervical intervertebral disc 11/30/2012     Priority: High   • Obstructive sleep apnea 12/02/2012     Priority: Medium   • Iron deficiency anemia 12/02/2012     Priority: Medium   • HTN (hypertension) 12/21/2011     Priority: Medium   • Major depression 12/02/2012     Priority: Low   • Vitamin D deficiency disease 01/12/2012     Priority: Low   • H/O cervical spine surgery 01/15/2021   • Memory problem 10/09/2020   • History of colon polyps 11/13/2019   • Flank pain 09/12/2019   • Left lower quadrant pain 08/15/2019   • Dyslipidemia 07/26/2018   • Morbid obesity with BMI of 40.0-44.9, adult ([facility]) 05/10/2018   • H/O colonoscopy with polypectomy 08/17/2017   • Chronic use of opiate drug for therapeutic purpose 08/11/2016   • Lumbar stenosis with neurogenic claudication 12/31/2015   • Anemia of unknown etiology 05/21/2015   • Sinusitis 04/21/2015   • Body aches 11/19/2014   • Nephrolithiasis 07/30/2014   • BPH (benign prostatic hyperplasia) 12/20/2013   • Degeneration of lumbar or lumbosacral intervertebral disc 12/30/2012   • Chronic pain of left knee 09/18/2012   • Diabetes mellitus type 2 in obese (HCC) 12/21/2011   • Insomnia 12/21/2011   • GERD (gastroesophageal reflux disease) 12/21/2011   • Chronic back pain 12/21/2011       Past Surgical History:  Past Surgical History:   Procedure Laterality Date   • COLONOSCOPY  11/13/2019    Procedure: COLONOSCOPY;  Surgeon: Buddy BISHOP  TANIA Diaz;  Location: SURGERY [facility];  Service: Gastroenterology   • LUMBAR FUSION POSTERIOR  12/30/2012    Performed by Benjamin Kumar M.D. at SURGERY [facility]   • CERVICAL DISK AND FUSION ANTERIOR  11/30/2012    Performed by Benjamin Kumar M.D. at SURGERY [facility]   • HARDWARE REMOVAL ORTHO  11/30/2012    Performed by Benjamin Kumar M.D. at SURGERY [facility]   • KNEE ARTHROSCOPY  9/18/2012    Performed by Servando Francis M.D. at SURGERY [facility]   • MEDIAL MENISCECTOMY  9/18/2012    Performed by Servando Francis M.D. at SURGERY [facility]   • DEBRIDEMENT  9/18/2012    Performed by Servando Francis M.D. at SURGERY [facility]   • LUMBAR FUSION POSTERIOR  6/18/2010    Performed by BENJAMIN KUMAR at SURGERY [facility]   • LUMBAR DECOMPRESSION  6/18/2010    Performed by BENJAMIN KUMAR at SURGERY [facility]   • KNEE ARTHROSCOPY  10/20/2009    Performed by SERVANDO FRANCIS at SURGERY [facility]   • MEDIAL MENISCECTOMY  10/20/2009    Performed by SERVANDO FRANCIS at SURGERY [facility]   • CERVICAL FUSION POSTERIOR  9/2007    c -3-4-5    • CHOLECYSTECTOMY  1984    open   • FOOT SURGERY      left  Farrar's neuroma and heel spur       Allergies:  Penicillins, Phenergan [promethazine hcl], and Influenza virus vacc    Social History:  Social History     Socioeconomic History   • Marital status:      Spouse name: Not on file   • Number of children: Not on file   • Years of education: Not on file   • Highest education level: Not on file   Occupational History   • Not on file   Social Needs   • Financial resource strain: Not on file   • Food insecurity     Worry: Not on file     Inability: Not on file   • Transportation needs     Medical: Not on file     Non-medical: Not on file   Tobacco Use   • Smoking status: Never Smoker   • Smokeless tobacco: Never Used   Substance and Sexual Activity   • Alcohol use: No     Alcohol/week: 0.0 oz     • Drug use: No   • Sexual activity: Yes     Partners: Female   Lifestyle   • Physical activity     Days per week: Not on file     Minutes per session: Not on file   • Stress: Not on file   Relationships   • Social connections     Talks on phone: Not on file     Gets together: Not on file     Attends Buddhism service: Not on file     Active member of club or organization: Not on file     Attends meetings of clubs or organizations: Not on file     Relationship status: Not on file   • Intimate partner violence     Fear of current or ex partner: Not on file     Emotionally abused: Not on file     Physically abused: Not on file     Forced sexual activity: Not on file   Other Topics Concern   •  Service No   • Blood Transfusions Yes   • Caffeine Concern No   • Occupational Exposure No   • Hobby Hazards No   • Sleep Concern Yes   • Stress Concern Yes   • Weight Concern Yes   • Special Diet No   • Back Care No   • Exercise No   • Bike Helmet No   • Seat Belt Yes   • Self-Exams Yes   Social History Narrative   • Not on file       Family History:  Family History   Problem Relation Age of Onset   • Other Mother         osteopenia   • Hypertension Mother    • Psychiatric Illness Mother    • Other Father         brain tumor   • Heart Attack Maternal Grandfather    • Sleep Apnea Neg Hx        Medications:    Current Outpatient Medications:   •  Dulaglutide (TRULICITY) 1.5 MG/0.5ML Solution Pen-injector, Inject 0.5 mL under the skin every 7 days., Disp: 2 mL, Rfl: 3  •  oxyCODONE-acetaminophen (PERCOCET-10)  MG Tab, TAKE 1 2 TO 1 (ONE HALF TO ONE) TABLET BY MOUTH EVERY 4 HOURS FOR 10 DAYS AS NEEDED FOR PAIN (MODERATE 4 6), Disp: , Rfl:   •  ferrous sulfate 325 (65 Fe) MG EC tablet, Take 1 Tab by mouth every day., Disp: 90 Tab, Rfl: 1  •  metformin (GLUCOPHAGE) 1000 MG tablet, TAKE 1 TABLET BY MOUTH TWICE DAILY WITH MEALS, Disp: 180 Tab, Rfl: 1  •  finasteride (PROSCAR) 5 MG Tab, Take 1 tablet by mouth once daily,  Disp: 90 Tab, Rfl: 1  •  tamsulosin (FLOMAX) 0.4 MG capsule, Take 2 capsules by mouth once daily, Disp: 180 Cap, Rfl: 1  •  amLODIPine (NORVASC) 10 MG Tab, Take 1 tablet by mouth once daily, Disp: 90 Tab, Rfl: 1  •  sertraline (ZOLOFT) 50 MG Tab, Take 1 tablet by mouth once daily, Disp: 90 Tab, Rfl: 1  •  traZODone (DESYREL) 100 MG Tab, TAKE 1 TO 2 TABLETS BY MOUTH ONCE DAILY AT BEDTIME AS NEEDED FOR SLEEP, Disp: 180 Tab, Rfl: 1  •  atorvastatin (LIPITOR) 20 MG Tab, Take 1 Tab by mouth every day. To lower you risk of heart attacks and strokes., Disp: 90 Tab, Rfl: 3  •  celecoxib (CELEBREX) 200 MG Cap, Take 1 capsule by mouth twice daily, Disp: 180 Cap, Rfl: 1  •  losartan (COZAAR) 100 MG Tab, Take 1 Tab by mouth every day., Disp: 90 Each, Rfl: 1  •  metoprolol SR (TOPROL XL) 25 MG TABLET SR 24 HR, Take 1 tablet by mouth once daily, Disp: 90 Tab, Rfl: 1  •  amitriptyline (ELAVIL) 50 MG Tab, TAKE 1 TABLET BY MOUTH AT BEDTIME, Disp: 90 Tab, Rfl: 3  •  pioglitazone (ACTOS) 30 MG Tab, Take 1 Tab by mouth every day., Disp: 90 Tab, Rfl: 3  •  zolpidem (AMBIEN) 10 MG Tab, TAKE 1 TABLET BY MOUTH ONCE DAILY AT BEDTIME AS NEEDED FOR SLEEP FOR 30 DAYS, Disp: 90 Each, Rfl: 1  •  gabapentin (NEURONTIN) 300 MG Cap, TAKE 3 CAPSULES BY MOUTH THREE TIMES DAILY, Disp: 810 Cap, Rfl: 3  •  insulin NPH (NOVOLIN N RELION) 100 UNIT/ML Suspension, Inject 15 Units as instructed 2 times a day., Disp: 10 mL, Rfl: 11  •  Omega-3 Fatty Acids (FISH OIL PO), Take  by mouth every day., Disp: , Rfl:   •  FIBER PO, Take 2 Caps by mouth every day. Gummy, Disp: , Rfl:   •  Misc. Devices Misc, One touch ultra test strips. Patient has DM type 2 and tests twice per day, Disp: 100 Each, Rfl: 3  •  Misc. Devices Misc, One touch ultra lancets; patient has diabetes mellitus type 2 and test once per day, Disp: 100 Device, Rfl: 3  •  tizanidine (ZANAFLEX) 4 MG Tab, Take 1 Tab by mouth every 6 hours as needed (back pain). (Patient taking differently: Take 4 mg  by mouth every day.), Disp: 270 Tab, Rfl: 3  •  omeprazole (PRILOSEC) 20 MG delayed-release capsule, TAKE ONE CAPSULE BY MOUTH ONCE DAILY, Disp: 90 Cap, Rfl: 3  •  Misc. Devices Misc, CPAP supplies; fax to [facility] [phone]., Disp: 100 Device, Rfl: 3  •  fluticasone (FLONASE) 50 MCG/ACT nasal spray, Spray 2 Sprays in nose every day. Each Nostril, Disp: 1 Bottle, Rfl: 3  •  albuterol (VENTOLIN OR PROVENTIL) 108 (90 BASE) MCG/ACT AERS inhalation aerosol, Inhale 2 Puffs by mouth every 6 hours as needed for Shortness of Breath., Disp: 8.5 g, Rfl: 3  •  Multiple Vitamins-Minerals (MULTIVITAMIN PO), Take 1 Tab by mouth every day.  , Disp: , Rfl:   •  Fexofenadine HCl (ALLEGRA PO), Take  by mouth as needed., Disp: , Rfl:   •  Cholecalciferol (VITAMIN D) 2000 UNIT CAPS, Take  by mouth every day., Disp: , Rfl:   •  GLUCOSAMINE-CHONDROITIN-MSM PO, Take 2 Tabs by mouth every day., Disp: , Rfl:   •  aspirin 81 MG tablet, Take 81 mg by mouth every day., Disp: , Rfl:     Labs: Reviewed    Physical Examination:  Vital signs: There were no vitals taken for this visit. There is no height or weight on file to calculate BMI.    Assessment and Plan:    1. DM2  · Since pt's last appt, his A1c has decreased from 9% (10/2020) to 8.3% (1/2021). Pt's SMBG readings support the decrease in A1c. Congratulated pt on his progress thus far.   · Pt states that he is tolerating the Trulicity well w/ no noted issues. He endorses lack of appetite since starting the medication as well.   · Of note, pt decreased his insulin use by half - he self titrates according to BG readings.  · Pt remains employed w/ [employer], he is in the process of applying for disability. Expect that ins coverage will change - PharmD will CTM affordability in f/u visits.    - Medication changes -   · Increase Trulicity to 1.5 mg once weekly. Counseled on administration, MOA, and SE again. Counseled pt to continue to titrate insulin down accordingly to avoid BG < 70.  · Continue  all other medications at this time    - Lifestyle changes -   · Diet: Pt still has significant room for improvement w/ his diet. Both he and his wife are wheelchair bound and eat many pre-prepared meals. They have cut down on fast food since the last visit. Counseled them to increase protein and veggies and to moderate carbs.  · Exercise: Increase as much as tolerated as pt recently had back surgery and is in a brace    FU: 4 weeks to assess SMBG, Trulicity dose increase toleration and affordability, check in on lifestyle modifications    Garry Evans, PharmD  01/22/21    CC:   Ryann Street M.D.

## 2021-02-26 ENCOUNTER — NON-PROVIDER VISIT (OUTPATIENT)
Dept: MEDICAL GROUP | Facility: PHYSICIAN GROUP | Age: 61
End: 2021-02-26
Payer: COMMERCIAL

## 2021-02-26 ENCOUNTER — HOSPITAL ENCOUNTER (OUTPATIENT)
Dept: LAB | Facility: MEDICAL CENTER | Age: 61
End: 2021-02-26
Attending: PHYSICIAN ASSISTANT
Payer: COMMERCIAL

## 2021-02-26 VITALS — HEART RATE: 83 BPM | SYSTOLIC BLOOD PRESSURE: 115 MMHG | DIASTOLIC BLOOD PRESSURE: 79 MMHG

## 2021-02-26 DIAGNOSIS — E11.69 DIABETES MELLITUS TYPE 2 IN OBESE: ICD-10-CM

## 2021-02-26 DIAGNOSIS — E66.9 DIABETES MELLITUS TYPE 2 IN OBESE: ICD-10-CM

## 2021-02-26 LAB
ALBUMIN SERPL BCP-MCNC: 3.8 G/DL (ref 3.2–4.9)
ALBUMIN/GLOB SERPL: 1.2 G/DL
ALP SERPL-CCNC: 132 U/L (ref 30–99)
ALT SERPL-CCNC: 14 U/L (ref 2–50)
ANION GAP SERPL CALC-SCNC: 10 MMOL/L (ref 7–16)
AST SERPL-CCNC: 20 U/L (ref 12–45)
BASOPHILS # BLD AUTO: 0.5 % (ref 0–1.8)
BASOPHILS # BLD: 0.03 K/UL (ref 0–0.12)
BILIRUB SERPL-MCNC: 0.8 MG/DL (ref 0.1–1.5)
BUN SERPL-MCNC: 12 MG/DL (ref 8–22)
CALCIUM SERPL-MCNC: 9.7 MG/DL (ref 8.5–10.5)
CHLORIDE SERPL-SCNC: 102 MMOL/L (ref 96–112)
CO2 SERPL-SCNC: 25 MMOL/L (ref 20–33)
CREAT SERPL-MCNC: 0.81 MG/DL (ref 0.5–1.4)
CRP SERPL HS-MCNC: 0.69 MG/DL (ref 0–0.75)
EOSINOPHIL # BLD AUTO: 0.14 K/UL (ref 0–0.51)
EOSINOPHIL NFR BLD: 2.1 % (ref 0–6.9)
ERYTHROCYTE [DISTWIDTH] IN BLOOD BY AUTOMATED COUNT: 44 FL (ref 35.9–50)
ERYTHROCYTE [SEDIMENTATION RATE] IN BLOOD BY WESTERGREN METHOD: 26 MM/HOUR (ref 0–20)
GLOBULIN SER CALC-MCNC: 3.3 G/DL (ref 1.9–3.5)
GLUCOSE SERPL-MCNC: 154 MG/DL (ref 65–99)
HCT VFR BLD AUTO: 36.2 % (ref 42–52)
HGB BLD-MCNC: 10.9 G/DL (ref 14–18)
IMM GRANULOCYTES # BLD AUTO: 0.06 K/UL (ref 0–0.11)
IMM GRANULOCYTES NFR BLD AUTO: 0.9 % (ref 0–0.9)
LYMPHOCYTES # BLD AUTO: 1.68 K/UL (ref 1–4.8)
LYMPHOCYTES NFR BLD: 25.2 % (ref 22–41)
MCH RBC QN AUTO: 24.1 PG (ref 27–33)
MCHC RBC AUTO-ENTMCNC: 30.1 G/DL (ref 33.7–35.3)
MCV RBC AUTO: 80.1 FL (ref 81.4–97.8)
MONOCYTES # BLD AUTO: 0.52 K/UL (ref 0–0.85)
MONOCYTES NFR BLD AUTO: 7.8 % (ref 0–13.4)
NEUTROPHILS # BLD AUTO: 4.23 K/UL (ref 1.82–7.42)
NEUTROPHILS NFR BLD: 63.5 % (ref 44–72)
NRBC # BLD AUTO: 0 K/UL
NRBC BLD-RTO: 0 /100 WBC
PLATELET # BLD AUTO: 275 K/UL (ref 164–446)
PMV BLD AUTO: 10 FL (ref 9–12.9)
POTASSIUM SERPL-SCNC: 4.3 MMOL/L (ref 3.6–5.5)
PROT SERPL-MCNC: 7.1 G/DL (ref 6–8.2)
RBC # BLD AUTO: 4.52 M/UL (ref 4.7–6.1)
SODIUM SERPL-SCNC: 137 MMOL/L (ref 135–145)
WBC # BLD AUTO: 6.7 K/UL (ref 4.8–10.8)

## 2021-02-26 PROCEDURE — 85652 RBC SED RATE AUTOMATED: CPT

## 2021-02-26 PROCEDURE — 80053 COMPREHEN METABOLIC PANEL: CPT

## 2021-02-26 PROCEDURE — 36415 COLL VENOUS BLD VENIPUNCTURE: CPT

## 2021-02-26 PROCEDURE — 85025 COMPLETE CBC W/AUTO DIFF WBC: CPT

## 2021-02-26 PROCEDURE — 86140 C-REACTIVE PROTEIN: CPT

## 2021-02-26 PROCEDURE — 99402 PREV MED CNSL INDIV APPRX 30: CPT | Performed by: FAMILY MEDICINE

## 2021-02-26 RX ORDER — DULAGLUTIDE 1.5 MG/.5ML
0.5 INJECTION, SOLUTION SUBCUTANEOUS
Qty: 2 ML | Refills: 3 | Status: SHIPPED | OUTPATIENT
Start: 2021-02-26 | End: 2021-05-07

## 2021-02-26 NOTE — NON-PROVIDER
Patient Consult Note    TIME IN: 2:30 pm  TIME OUT: 3:10 pm    Primary care physician: Ryann Street M.D.    Reason for consult: Management of Uncontrolled Type 2 Diabetes    HPI:  Jamey Riojas is a 60 y.o. old patient who comes in today for evaluation of above stated problem.    Most Recent HbA1c:   Lab Results   Component Value Date/Time    HBA1C 8.3 (H) 01/11/2021 01:13 PM      Lab Results   Component Value Date/Time    CREATININE 0.72 01/11/2021 01:13 PM    CREATININE 0.8 09/10/2007 10:15 AM        Diabetes Medication History and Current Regimen  Metformin: 1000 mg BID   GLP-1 Agent: Dulaglutide 1.5 mg once weekly   Thiazolidinedione: Pioglitazone 30 mg once daily    Basal Insulin: NPH 15 units QAM and QPM    Pt has home glucometer and proper testing technique - Yes    Pt reports blood sugars:   Before Breakfast: 109, 108, 105, 110, 120    Hypoglycemia awareness - Yes  Nocturnal hypoglycemia- Denies  Hypoglycemia:  None     Pt's treatment of Hypoglycemia - Counseled  - 15:15 Rule    Current Exercise - Pt states he walks around his house (5-10 mins most days) - Pt recently had another back surgery. Pt continues to wait on disability to go through.   Exercise Goal - Increase as tolerated    Dietary - Pt states that he is eating less since his back surgery (procedures were in mid December and again since last appt). Frozen dinners, chicken pot pie, apples/oranges, jello, berries, yogurt, slim fast, occasional potato chips (decreased from previous habit of eating a whole bag). Attempts to cut down on carbs and maximize protein and veggies. Is not eating out as often.    Foot Exam:  Monofilament exam - Completed 10/2020    Preventative Management  BP regimen (ACE/ARB) - Losartan 100 mg once daily  ASA - 81 mg once daily  Statin - Atorvastatin 20 mg once daily  Last Retinal Scan - 2/2020. Pt does annually. Pt to schedule prior to next apt  Last Foot Exam - Pt checks daily as well as providers at  visits  Last A1c -   Lab Results   Component Value Date/Time    HBA1C 8.3 (H) 01/11/2021 01:13 PM      Last Microalbuminuria - Last Microalbuminuria - 0.7 (8/2019)    Past Medical History:  Patient Active Problem List    Diagnosis Date Noted   • Degeneration of cervical intervertebral disc 11/30/2012   • Obstructive sleep apnea 12/02/2012   • Iron deficiency anemia 12/02/2012   • HTN (hypertension) 12/21/2011   • Major depression 12/02/2012   • Vitamin D deficiency disease 01/12/2012   • H/O cervical spine surgery 01/15/2021   • Memory problem 10/09/2020   • History of colon polyps 11/13/2019   • Flank pain 09/12/2019   • Left lower quadrant pain 08/15/2019   • Dyslipidemia 07/26/2018   • Morbid obesity with BMI of 40.0-44.9, adult ([facility]) 05/10/2018   • H/O colonoscopy with polypectomy 08/17/2017   • Chronic use of opiate drug for therapeutic purpose 08/11/2016   • Lumbar stenosis with neurogenic claudication 12/31/2015   • Anemia of unknown etiology 05/21/2015   • Sinusitis 04/21/2015   • Body aches 11/19/2014   • Nephrolithiasis 07/30/2014   • BPH (benign prostatic hyperplasia) 12/20/2013   • Degeneration of lumbar or lumbosacral intervertebral disc 12/30/2012   • Chronic pain of left knee 09/18/2012   • Diabetes mellitus type 2 in obese ([facility]) 12/21/2011   • Insomnia 12/21/2011   • GERD (gastroesophageal reflux disease) 12/21/2011   • Chronic back pain 12/21/2011       Past Surgical History:  Past Surgical History:   Procedure Laterality Date   • COLONOSCOPY  11/13/2019    Procedure: COLONOSCOPY;  Surgeon: Buddy Diaz M.D.;  Location: SURGERY [facility];  Service: Gastroenterology   • LUMBAR FUSION POSTERIOR  12/30/2012    Performed by Mohsen Chan M.D. at SURGERY [facility]   • CERVICAL DISK AND FUSION ANTERIOR  11/30/2012    Performed by Mohsen Chan M.D. at SURGERY [facility]   • HARDWARE REMOVAL ORTHO  11/30/2012    Performed by Mohsen Chan M.D. at SURGERY [facility]   •  KNEE ARTHROSCOPY  9/18/2012    Performed by James Long M.D. at SURGERY [facility] [place]   • MEDIAL MENISCECTOMY  9/18/2012    Performed by James Long M.D. at SURGERY [facility] [place]   • DEBRIDEMENT  9/18/2012    Performed by James Long M.D. at SURGERY [facility] [place]   • LUMBAR FUSION POSTERIOR  6/18/2010    Performed by BENJAMIN KUMAR at SURGERY [facility] [place]   • LUMBAR DECOMPRESSION  6/18/2010    Performed by BENJAMIN KUMAR at SURGERY [facility] [place]   • KNEE ARTHROSCOPY  10/20/2009    Performed by JAMES LONG at SURGERY [facility] [place]   • MEDIAL MENISCECTOMY  10/20/2009    Performed by JAMES LONG at SURGERY [facility] [place]   • CERVICAL FUSION POSTERIOR  9/2007    c -3-4-5    • CHOLECYSTECTOMY  1984    open   • FOOT SURGERY      left  Farrar's neuroma and heel spur       Allergies:  Penicillins, Phenergan [promethazine hcl], and Influenza virus vacc    Social History:  Social History     Socioeconomic History   • Marital status:      Spouse name: Not on file   • Number of children: Not on file   • Years of education: Not on file   • Highest education level: Not on file   Occupational History   • Not on file   Tobacco Use   • Smoking status: Never Smoker   • Smokeless tobacco: Never Used   Substance and Sexual Activity   • Alcohol use: No     Alcohol/week: 0.0 oz   • Drug use: No   • Sexual activity: Yes     Partners: Female   Other Topics Concern   •  Service No   • Blood Transfusions Yes   • Caffeine Concern No   • Occupational Exposure No   • Hobby Hazards No   • Sleep Concern Yes   • Stress Concern Yes   • Weight Concern Yes   • Special Diet No   • Back Care No   • Exercise No   • Bike Helmet No   • Seat Belt Yes   • Self-Exams Yes   Social History Narrative   • Not on file     Social Determinants of Health     Financial Resource Strain:    • Difficulty of Paying Living Expenses:    Food Insecurity:    • Worried About Running Out of Food in the Last  Year:    • Ran Out of Food in the Last Year:    Transportation Needs:    • Lack of Transportation (Medical):    • Lack of Transportation (Non-Medical):    Physical Activity:    • Days of Exercise per Week:    • Minutes of Exercise per Session:    Stress:    • Feeling of Stress :    Social Connections:    • Frequency of Communication with Friends and Family:    • Frequency of Social Gatherings with Friends and Family:    • Attends [employer] Services:    • Active Member of Clubs or Organizations:    • Attends Club or Organization Meetings:    • Marital Status:    Intimate Partner Violence:    • Fear of Current or Ex-Partner:    • Emotionally Abused:    • Physically Abused:    • Sexually Abused:        Family History:  Family History   Problem Relation Age of Onset   • Other Mother         osteopenia   • Hypertension Mother    • Psychiatric Illness Mother    • Other Father         brain tumor   • Heart Attack Maternal Grandfather    • Sleep Apnea Neg Hx        Medications:    Current Outpatient Medications:   •  Dulaglutide (TRULICITY) 1.5 MG/0.5ML Solution Pen-injector, Inject 0.5 mL under the skin every 7 days., Disp: 2 mL, Rfl: 3  •  metformin (GLUCOPHAGE) 1000 MG tablet, TAKE 1 TABLET BY MOUTH TWICE DAILY WITH MEALS, Disp: 180 Tab, Rfl: 1  •  atorvastatin (LIPITOR) 20 MG Tab, Take 1 Tab by mouth every day. To lower you risk of heart attacks and strokes., Disp: 90 Tab, Rfl: 3  •  losartan (COZAAR) 100 MG Tab, Take 1 Tab by mouth every day., Disp: 90 Each, Rfl: 1  •  pioglitazone (ACTOS) 30 MG Tab, Take 1 Tab by mouth every day., Disp: 90 Tab, Rfl: 3  •  insulin NPH (NOVOLIN N RELION) 100 UNIT/ML Suspension, Inject 15 Units as instructed 2 times a day., Disp: 10 mL, Rfl: 11  •  aspirin 81 MG tablet, Take 81 mg by mouth every day., Disp: , Rfl:   •  oxyCODONE-acetaminophen (PERCOCET-10)  MG Tab, TAKE 1 2 TO 1 (ONE HALF TO ONE) TABLET BY MOUTH EVERY 4 HOURS FOR 10 DAYS AS NEEDED FOR PAIN (MODERATE 4 6), Disp: ,  Rfl:   •  ferrous sulfate 325 (65 Fe) MG EC tablet, Take 1 Tab by mouth every day., Disp: 90 Tab, Rfl: 1  •  finasteride (PROSCAR) 5 MG Tab, Take 1 tablet by mouth once daily, Disp: 90 Tab, Rfl: 1  •  tamsulosin (FLOMAX) 0.4 MG capsule, Take 2 capsules by mouth once daily, Disp: 180 Cap, Rfl: 1  •  amLODIPine (NORVASC) 10 MG Tab, Take 1 tablet by mouth once daily, Disp: 90 Tab, Rfl: 1  •  sertraline (ZOLOFT) 50 MG Tab, Take 1 tablet by mouth once daily, Disp: 90 Tab, Rfl: 1  •  traZODone (DESYREL) 100 MG Tab, TAKE 1 TO 2 TABLETS BY MOUTH ONCE DAILY AT BEDTIME AS NEEDED FOR SLEEP, Disp: 180 Tab, Rfl: 1  •  celecoxib (CELEBREX) 200 MG Cap, Take 1 capsule by mouth twice daily, Disp: 180 Cap, Rfl: 1  •  metoprolol SR (TOPROL XL) 25 MG TABLET SR 24 HR, Take 1 tablet by mouth once daily, Disp: 90 Tab, Rfl: 1  •  amitriptyline (ELAVIL) 50 MG Tab, TAKE 1 TABLET BY MOUTH AT BEDTIME, Disp: 90 Tab, Rfl: 3  •  gabapentin (NEURONTIN) 300 MG Cap, TAKE 3 CAPSULES BY MOUTH THREE TIMES DAILY, Disp: 810 Cap, Rfl: 3  •  Omega-3 Fatty Acids (FISH OIL PO), Take  by mouth every day., Disp: , Rfl:   •  FIBER PO, Take 2 Caps by mouth every day. Gummy, Disp: , Rfl:   •  Misc. Devices Misc, One touch ultra test strips. Patient has DM type 2 and tests twice per day, Disp: 100 Each, Rfl: 3  •  Misc. Devices Misc, One touch ultra lancets; patient has diabetes mellitus type 2 and test once per day, Disp: 100 Device, Rfl: 3  •  tizanidine (ZANAFLEX) 4 MG Tab, Take 1 Tab by mouth every 6 hours as needed (back pain). (Patient taking differently: Take 4 mg by mouth every day.), Disp: 270 Tab, Rfl: 3  •  omeprazole (PRILOSEC) 20 MG delayed-release capsule, TAKE ONE CAPSULE BY MOUTH ONCE DAILY, Disp: 90 Cap, Rfl: 3  •  Misc. Devices Misc, CPAP supplies; fax to [facility] [phone]., Disp: 100 Device, Rfl: 3  •  fluticasone (FLONASE) 50 MCG/ACT nasal spray, Spray 2 Sprays in nose every day. Each Nostril, Disp: 1 Bottle, Rfl: 3  •  albuterol (VENTOLIN OR  PROVENTIL) 108 (90 BASE) MCG/ACT AERS inhalation aerosol, Inhale 2 Puffs by mouth every 6 hours as needed for Shortness of Breath., Disp: 8.5 g, Rfl: 3  •  Multiple Vitamins-Minerals (MULTIVITAMIN PO), Take 1 Tab by mouth every day.  , Disp: , Rfl:   •  Fexofenadine HCl (ALLEGRA PO), Take  by mouth as needed., Disp: , Rfl:   •  Cholecalciferol (VITAMIN D) 2000 UNIT CAPS, Take  by mouth every day., Disp: , Rfl:   •  GLUCOSAMINE-CHONDROITIN-MSM PO, Take 2 Tabs by mouth every day., Disp: , Rfl:     Labs: Reviewed    Physical Examination:  Vital signs: /79 (BP Location: Right arm, Patient Position: Sitting, BP Cuff Size: Adult)   Pulse 83  There is no height or weight on file to calculate BMI.    Assessment and Plan:    1. DM2  · Walking into clinic, pt experienced a ground level fall and did hit his head on the wall - (pt states he was in no pain and no s/s of erythema or inflammation present). PharmD attempted to catch pt, but was unable to support pt. Vitals were taken and WNL. Pt assisted into wheelchair and given apple juice. Of note, pt was sleeping in waiting room prior to the visit.  · Since last appt, pt had a GLF at home where he displaced some of the hardware from previous surgeries. Pt also had a spinal cord tear from this. He essentially had to have his previous procedure re-done after this. Pt states he has not been sleeping well 2/2 to this and having to sleep on the recliner.  · Since his last appt, pt states he feels that he has lost weight, although does not weight himself - he will start to do this.  · Pt's SMBG are entirely w/in goal range - A1c upon f/u to confirm this.    - Medication changes -   · Continue current regimen  · Given pt's significant recent medical hx, he was understandably hesitant to make any more changes to his DM regimen today   · Will consider increasing Trulicity to 3 mg upon f/u if A1c is not at or near goal    - Lifestyle changes -   · Diet: Pt has made significant  progress since establishing care with pharmacotherapy clinic. He continues to cut down on carbs and fast food. He also endorses decreased appetite w/ Trulicity. Pt encouraged to continue to decrease carbs and fast food and to maximize protein and veggies.  · Exercise: Increase as tolerated    FU: 4 weeks to assess A1c    Andrew SoloD  02/26/21    CC:   Ryann Street M.D.

## 2021-02-26 NOTE — Clinical Note
Hi Dr. Street,  I think that this pt would potentially benefit from home health services moving forward. Not sure if you have discussed this w/ Dank previously, but given his last fall at home and his fall today I am concerned. Let me know if you have any questions - I just wanted to run this by you!  Thanks,  Garry

## 2021-03-09 ENCOUNTER — TELEPHONE (OUTPATIENT)
Dept: MEDICAL GROUP | Facility: PHYSICIAN GROUP | Age: 61
End: 2021-03-09

## 2021-03-10 NOTE — TELEPHONE ENCOUNTER
Patti called concerned about patient's lab results. She stated that she is concerned that he's anemic. Can you please take a look at the results?

## 2021-03-12 ENCOUNTER — HOSPITAL ENCOUNTER (OUTPATIENT)
Dept: LAB | Facility: MEDICAL CENTER | Age: 61
End: 2021-03-12
Attending: INTERNAL MEDICINE
Payer: COMMERCIAL

## 2021-03-12 PROCEDURE — 86140 C-REACTIVE PROTEIN: CPT

## 2021-03-12 PROCEDURE — 36415 COLL VENOUS BLD VENIPUNCTURE: CPT

## 2021-03-12 PROCEDURE — 85007 BL SMEAR W/DIFF WBC COUNT: CPT

## 2021-03-12 PROCEDURE — 85652 RBC SED RATE AUTOMATED: CPT

## 2021-03-12 PROCEDURE — 87086 URINE CULTURE/COLONY COUNT: CPT

## 2021-03-12 PROCEDURE — 87077 CULTURE AEROBIC IDENTIFY: CPT

## 2021-03-12 PROCEDURE — 87186 SC STD MICRODIL/AGAR DIL: CPT

## 2021-03-12 PROCEDURE — 81001 URINALYSIS AUTO W/SCOPE: CPT

## 2021-03-12 PROCEDURE — 80053 COMPREHEN METABOLIC PANEL: CPT

## 2021-03-12 PROCEDURE — 85027 COMPLETE CBC AUTOMATED: CPT

## 2021-03-13 LAB
ALBUMIN SERPL BCP-MCNC: 3.8 G/DL (ref 3.2–4.9)
ALBUMIN/GLOB SERPL: 1.2 G/DL
ALP SERPL-CCNC: 151 U/L (ref 30–99)
ALT SERPL-CCNC: 20 U/L (ref 2–50)
ANION GAP SERPL CALC-SCNC: 11 MMOL/L (ref 7–16)
ANISOCYTOSIS BLD QL SMEAR: ABNORMAL
APPEARANCE UR: ABNORMAL
AST SERPL-CCNC: 17 U/L (ref 12–45)
BACTERIA #/AREA URNS HPF: ABNORMAL /HPF
BASOPHILS # BLD AUTO: 0 % (ref 0–1.8)
BASOPHILS # BLD: 0 K/UL (ref 0–0.12)
BILIRUB SERPL-MCNC: 0.9 MG/DL (ref 0.1–1.5)
BILIRUB UR QL STRIP.AUTO: NEGATIVE
BUN SERPL-MCNC: 16 MG/DL (ref 8–22)
CALCIUM SERPL-MCNC: 9.6 MG/DL (ref 8.5–10.5)
CAOX CRY #/AREA URNS HPF: ABNORMAL /HPF
CHLORIDE SERPL-SCNC: 98 MMOL/L (ref 96–112)
CO2 SERPL-SCNC: 25 MMOL/L (ref 20–33)
COLOR UR: ABNORMAL
CREAT SERPL-MCNC: 0.73 MG/DL (ref 0.5–1.4)
CRP SERPL HS-MCNC: 0.53 MG/DL (ref 0–0.75)
EOSINOPHIL # BLD AUTO: 0.15 K/UL (ref 0–0.51)
EOSINOPHIL NFR BLD: 1.8 % (ref 0–6.9)
EPI CELLS #/AREA URNS HPF: NEGATIVE /HPF
ERYTHROCYTE [DISTWIDTH] IN BLOOD BY AUTOMATED COUNT: 47.1 FL (ref 35.9–50)
ERYTHROCYTE [SEDIMENTATION RATE] IN BLOOD BY WESTERGREN METHOD: 29 MM/HOUR (ref 0–20)
GLOBULIN SER CALC-MCNC: 3.3 G/DL (ref 1.9–3.5)
GLUCOSE SERPL-MCNC: 209 MG/DL (ref 65–99)
GLUCOSE UR STRIP.AUTO-MCNC: NEGATIVE MG/DL
HCT VFR BLD AUTO: 37.5 % (ref 42–52)
HGB BLD-MCNC: 11.2 G/DL (ref 14–18)
HYALINE CASTS #/AREA URNS LPF: ABNORMAL /LPF
KETONES UR STRIP.AUTO-MCNC: NEGATIVE MG/DL
LEUKOCYTE ESTERASE UR QL STRIP.AUTO: ABNORMAL
LYMPHOCYTES # BLD AUTO: 1.18 K/UL (ref 1–4.8)
LYMPHOCYTES NFR BLD: 14 % (ref 22–41)
MACROCYTES BLD QL SMEAR: ABNORMAL
MANUAL DIFF BLD: NORMAL
MCH RBC QN AUTO: 23.9 PG (ref 27–33)
MCHC RBC AUTO-ENTMCNC: 29.9 G/DL (ref 33.7–35.3)
MCV RBC AUTO: 80.1 FL (ref 81.4–97.8)
MICRO URNS: ABNORMAL
MICROCYTES BLD QL SMEAR: ABNORMAL
MONOCYTES # BLD AUTO: 0.29 K/UL (ref 0–0.85)
MONOCYTES NFR BLD AUTO: 3.5 % (ref 0–13.4)
MORPHOLOGY BLD-IMP: NORMAL
NEUTROPHILS # BLD AUTO: 6.78 K/UL (ref 1.82–7.42)
NEUTROPHILS NFR BLD: 80.7 % (ref 44–72)
NITRITE UR QL STRIP.AUTO: POSITIVE
NRBC # BLD AUTO: 0 K/UL
NRBC BLD-RTO: 0 /100 WBC
PH UR STRIP.AUTO: 5 [PH] (ref 5–8)
PLATELET # BLD AUTO: 238 K/UL (ref 164–446)
PLATELET BLD QL SMEAR: NORMAL
PMV BLD AUTO: 10 FL (ref 9–12.9)
POLYCHROMASIA BLD QL SMEAR: NORMAL
POTASSIUM SERPL-SCNC: 4.1 MMOL/L (ref 3.6–5.5)
PROT SERPL-MCNC: 7.1 G/DL (ref 6–8.2)
PROT UR QL STRIP: 30 MG/DL
RBC # BLD AUTO: 4.68 M/UL (ref 4.7–6.1)
RBC # URNS HPF: ABNORMAL /HPF
RBC BLD AUTO: PRESENT
RBC UR QL AUTO: ABNORMAL
SODIUM SERPL-SCNC: 134 MMOL/L (ref 135–145)
SP GR UR STRIP.AUTO: 1.03
UROBILINOGEN UR STRIP.AUTO-MCNC: 1 MG/DL
WBC # BLD AUTO: 8.4 K/UL (ref 4.8–10.8)
WBC #/AREA URNS HPF: ABNORMAL /HPF

## 2021-03-15 LAB
BACTERIA UR CULT: ABNORMAL
BACTERIA UR CULT: ABNORMAL
SIGNIFICANT IND 70042: ABNORMAL
SITE SITE: ABNORMAL
SOURCE SOURCE: ABNORMAL

## 2021-03-17 DIAGNOSIS — F33.1 MODERATE EPISODE OF RECURRENT MAJOR DEPRESSIVE DISORDER (HCC): ICD-10-CM

## 2021-03-17 DIAGNOSIS — I10 ESSENTIAL HYPERTENSION: ICD-10-CM

## 2021-03-17 RX ORDER — METOPROLOL SUCCINATE 25 MG/1
TABLET, EXTENDED RELEASE ORAL
Qty: 90 TABLET | Refills: 3 | Status: SHIPPED | OUTPATIENT
Start: 2021-03-17 | End: 2022-07-06

## 2021-03-24 NOTE — TELEPHONE ENCOUNTER
LVM for patient to call back      Please let patient know that I have reviewed his hemoglobin levels they are actually improved from previous.  Although we should continue to evaluate at his next visit in April with me because of his iron deficiency anemia.  He should currently continue on the iron supplements.   Ryann Street M.D.

## 2021-04-02 ENCOUNTER — NON-PROVIDER VISIT (OUTPATIENT)
Dept: MEDICAL GROUP | Facility: PHYSICIAN GROUP | Age: 61
End: 2021-04-02
Payer: COMMERCIAL

## 2021-04-02 ENCOUNTER — HOSPITAL ENCOUNTER (OUTPATIENT)
Dept: LAB | Facility: MEDICAL CENTER | Age: 61
End: 2021-04-02
Attending: FAMILY MEDICINE
Payer: COMMERCIAL

## 2021-04-02 DIAGNOSIS — E11.69 DIABETES MELLITUS TYPE 2 IN OBESE: ICD-10-CM

## 2021-04-02 DIAGNOSIS — E61.1 IRON DEFICIENCY: ICD-10-CM

## 2021-04-02 DIAGNOSIS — E66.9 DIABETES MELLITUS TYPE 2 IN OBESE: ICD-10-CM

## 2021-04-02 LAB
ERYTHROCYTE [DISTWIDTH] IN BLOOD BY AUTOMATED COUNT: 50.1 FL (ref 35.9–50)
HCT VFR BLD AUTO: 37.5 % (ref 42–52)
HGB BLD-MCNC: 11.4 G/DL (ref 14–18)
MCH RBC QN AUTO: 24.3 PG (ref 27–33)
MCHC RBC AUTO-ENTMCNC: 30.4 G/DL (ref 33.7–35.3)
MCV RBC AUTO: 79.8 FL (ref 81.4–97.8)
PLATELET # BLD AUTO: 256 K/UL (ref 164–446)
PMV BLD AUTO: 10.1 FL (ref 9–12.9)
RBC # BLD AUTO: 4.7 M/UL (ref 4.7–6.1)
WBC # BLD AUTO: 7 K/UL (ref 4.8–10.8)

## 2021-04-02 PROCEDURE — 85027 COMPLETE CBC AUTOMATED: CPT

## 2021-04-02 PROCEDURE — 36415 COLL VENOUS BLD VENIPUNCTURE: CPT

## 2021-04-02 PROCEDURE — 82570 ASSAY OF URINE CREATININE: CPT

## 2021-04-02 PROCEDURE — 82043 UR ALBUMIN QUANTITATIVE: CPT

## 2021-04-02 PROCEDURE — 99401 PREV MED CNSL INDIV APPRX 15: CPT | Performed by: FAMILY MEDICINE

## 2021-04-02 PROCEDURE — 83540 ASSAY OF IRON: CPT

## 2021-04-02 PROCEDURE — 82728 ASSAY OF FERRITIN: CPT

## 2021-04-02 PROCEDURE — 83550 IRON BINDING TEST: CPT

## 2021-04-02 NOTE — PROGRESS NOTES
Patient Consult Note    TIME IN: 235  TIME OUT: 255    Primary care physician: Ryann Street M.D.    Reason for consult: Management of Uncontrolled Type 2 Diabetes    HPI:  Jamey Riojas is a 60 y.o. old patient who comes in today for evaluation of above stated problem.    Most Recent labs and A1c:    Lab Results   Component Value Date/Time    HBA1C 8.3 (H) 01/11/2021 01:13 PM          Lab Results   Component Value Date/Time    CHOLSTRLTOT 159 10/02/2020 09:40 AM    LDL 68 10/02/2020 09:40 AM    HDL 37 (A) 10/02/2020 09:40 AM    TRIGLYCERIDE 268 (H) 10/02/2020 09:40 AM       Lab Results   Component Value Date/Time    SODIUM 134 (L) 03/12/2021 01:10 PM    POTASSIUM 4.1 03/12/2021 01:10 PM    CHLORIDE 98 03/12/2021 01:10 PM    CO2 25 03/12/2021 01:10 PM    GLUCOSE 209 (H) 03/12/2021 01:10 PM    BUN 16 03/12/2021 01:10 PM    CREATININE 0.73 03/12/2021 01:10 PM    CREATININE 0.8 09/10/2007 10:15 AM     Lab Results   Component Value Date/Time    ALKPHOSPHAT 151 (H) 03/12/2021 01:10 PM    ASTSGOT 17 03/12/2021 01:10 PM    ALTSGPT 20 03/12/2021 01:10 PM    TBILIRUBIN 0.9 03/12/2021 01:10 PM    ALBUMIN 3.8 03/12/2021 01:10 PM      No components found for: MICROALBUMINCREATRATIOURINE    Diabetes Medication History and Current Regimen  Metformin: 1000 mg BID   GLP-1 Agent: Dulaglutide 1.5 mg once weekly   Thiazolidinedione: Pioglitazone 30 mg once daily   Basal Insulin: NPH 30 units QAM and QPM     Pt has home glucometer and proper testing technique - Yes     Pt reports blood sugars:   Before Breakfast: 120-140  After meals      Hypoglycemia awareness - Yes  Nocturnal hypoglycemia- Denies  Hypoglycemia:  None     Pt's treatment of Hypoglycemia - Counseled  - 15:15 Rule     Current Exercise - Pt states he walks around his house (5-10 mins most days) - Pt recently had another back surgery. Pt continues to wait on disability to go through.   Exercise Goal - Increase as tolerated     Dietary - Pt states that he is  eating less since his back surgery (procedures were in mid December and again since last appt). Frozen dinners, chicken pot pie, apples/oranges, jello, berries, yogurt, slim fast, occasional potato chips (decreased from previous habit of eating a whole bag). Attempts to cut down on carbs and maximize protein and veggies. Is not eating out as often.    Foot Exam:  Monofilament exam - Completed 10/2020    Preventative Management  BP regimen (ACE/ARB) -losartan 100 mg once daily      ASA -aspirin 81 mg once daily        Statin -atorvastatin 20 mg once daily  Cardiovascular   Patient Type, check all that apply:   Diabetes, primary prevention          Last Retinal Scan - 2/2020. Pt does annually. Pt to schedule prior to next apt  Last Foot Exam - Pt checks daily as well as providers at visits  Last A1c -         Lab Results   Component Value Date/Time     HBA1C 8.3 (H) 01/11/2021 01:13 PM      Last Microalbuminuria - Last Microalbuminuria - 0.7 (8/2019)        updated caregaps  Immunization History   Administered Date(s) Administered   • Influenza Vaccine Pediatric Split - Historical Data 11/20/2006   • Influenza Vaccine Quad Inj (Pf) 10/24/2017   • Influenza, Unspecified - HISTORICAL DATA 11/20/2006   • Pneumococcal polysaccharide vaccine (PPSV-23) 12/30/2005   • Tdap Vaccine 11/08/2015           Past Medical History:  Patient Active Problem List    Diagnosis Date Noted   • Degeneration of cervical intervertebral disc 11/30/2012   • Obstructive sleep apnea 12/02/2012   • Iron deficiency anemia 12/02/2012   • HTN (hypertension) 12/21/2011   • Major depression 12/02/2012   • Vitamin D deficiency disease 01/12/2012   • H/O cervical spine surgery 01/15/2021   • Memory problem 10/09/2020   • History of colon polyps 11/13/2019   • Flank pain 09/12/2019   • Left lower quadrant pain 08/15/2019   • Dyslipidemia 07/26/2018   • Morbid obesity with BMI of 40.0-44.9, adult (HCC) 05/10/2018   • H/O colonoscopy with polypectomy  08/17/2017   • Chronic use of opiate drug for therapeutic purpose 08/11/2016   • Lumbar stenosis with neurogenic claudication 12/31/2015   • Anemia of unknown etiology 05/21/2015   • Sinusitis 04/21/2015   • Body aches 11/19/2014   • Nephrolithiasis 07/30/2014   • BPH (benign prostatic hyperplasia) 12/20/2013   • Degeneration of lumbar or lumbosacral intervertebral disc 12/30/2012   • Chronic pain of left knee 09/18/2012   • Diabetes mellitus type 2 in obese (HCC) 12/21/2011   • Insomnia 12/21/2011   • GERD (gastroesophageal reflux disease) 12/21/2011   • Chronic back pain 12/21/2011       Past Surgical History:  Past Surgical History:   Procedure Laterality Date   • COLONOSCOPY  11/13/2019    Procedure: COLONOSCOPY;  Surgeon: Buddy Diaz M.D.;  Location: [facility];  Service: Gastroenterology   • LUMBAR FUSION POSTERIOR  12/30/2012    Performed by Benjamin Kumar M.D. at SURGERY [facility]   • CERVICAL DISK AND FUSION ANTERIOR  11/30/2012    Performed by Benjamin Kumar M.D. at [facility]   • HARDWARE REMOVAL ORTHO  11/30/2012    Performed by Benjamin Kumar M.D. at [facility]   • KNEE ARTHROSCOPY  9/18/2012    Performed by Servando Francis M.D. at [facility]   • MEDIAL MENISCECTOMY  9/18/2012    Performed by Servando Francis M.D. at [facility]   • DEBRIDEMENT  9/18/2012    Performed by Servando Francis M.D. at [facility]   • LUMBAR FUSION POSTERIOR  6/18/2010    Performed by BENJAMIN KUMAR at [facility]   • LUMBAR DECOMPRESSION  6/18/2010    Performed by BENJAMIN KUMAR at [facility]   • KNEE ARTHROSCOPY  10/20/2009    Performed by SERVANDO FRANCIS at [facility]   • MEDIAL MENISCECTOMY  10/20/2009    Performed by SERVANDO FRANCIS at [facility]   • CERVICAL FUSION POSTERIOR  9/2007    c -3-4-5    • CHOLECYSTECTOMY  1984    open   • FOOT SURGERY      left   Farrar's neuroma and heel spur       Allergies:  Penicillins, Phenergan [promethazine hcl], and Influenza virus vacc    Social History:  Social History     Socioeconomic History   • Marital status:      Spouse name: Not on file   • Number of children: Not on file   • Years of education: Not on file   • Highest education level: Not on file   Occupational History   • Not on file   Tobacco Use   • Smoking status: Never Smoker   • Smokeless tobacco: Never Used   Substance and Sexual Activity   • Alcohol use: No     Alcohol/week: 0.0 oz   • Drug use: No   • Sexual activity: Yes     Partners: Female   Other Topics Concern   •  Service No   • Blood Transfusions Yes   • Caffeine Concern No   • Occupational Exposure No   • Hobby Hazards No   • Sleep Concern Yes   • Stress Concern Yes   • Weight Concern Yes   • Special Diet No   • Back Care No   • Exercise No   • Bike Helmet No   • Seat Belt Yes   • Self-Exams Yes   Social History Narrative   • Not on file     Social Determinants of Health     Financial Resource Strain:    • Difficulty of Paying Living Expenses:    Food Insecurity:    • Worried About Running Out of Food in the Last Year:    • Ran Out of Food in the Last Year:    Transportation Needs:    • Lack of Transportation (Medical):    • Lack of Transportation (Non-Medical):    Physical Activity:    • Days of Exercise per Week:    • Minutes of Exercise per Session:    Stress:    • Feeling of Stress :    Social Connections:    • Frequency of Communication with Friends and Family:    • Frequency of Social Gatherings with Friends and Family:    • Attends Rastafarian Services:    • Active Member of Clubs or Organizations:    • Attends Club or Organization Meetings:    • Marital Status:    Intimate Partner Violence:    • Fear of Current or Ex-Partner:    • Emotionally Abused:    • Physically Abused:    • Sexually Abused:        Family History:  Family History   Problem Relation Age of Onset   • Other Mother          osteopenia   • Hypertension Mother    • Psychiatric Illness Mother    • Other Father         brain tumor   • Heart Attack Maternal Grandfather    • Sleep Apnea Neg Hx        Medications:    Current Outpatient Medications:   •  metoprolol SR (TOPROL XL) 25 MG TABLET SR 24 HR, Take 1 tablet by mouth once daily, Disp: 90 tablet, Rfl: 3  •  sertraline (ZOLOFT) 50 MG Tab, Take 1 tablet by mouth once daily, Disp: 90 tablet, Rfl: 3  •  Dulaglutide (TRULICITY) 1.5 MG/0.5ML Solution Pen-injector, Inject 0.5 mL under the skin every 7 days., Disp: 2 mL, Rfl: 3  •  oxyCODONE-acetaminophen (PERCOCET-10)  MG Tab, TAKE 1 2 TO 1 (ONE HALF TO ONE) TABLET BY MOUTH EVERY 4 HOURS FOR 10 DAYS AS NEEDED FOR PAIN (MODERATE 4 6), Disp: , Rfl:   •  ferrous sulfate 325 (65 Fe) MG EC tablet, Take 1 Tab by mouth every day., Disp: 90 Tab, Rfl: 1  •  metformin (GLUCOPHAGE) 1000 MG tablet, TAKE 1 TABLET BY MOUTH TWICE DAILY WITH MEALS, Disp: 180 Tab, Rfl: 1  •  finasteride (PROSCAR) 5 MG Tab, Take 1 tablet by mouth once daily, Disp: 90 Tab, Rfl: 1  •  tamsulosin (FLOMAX) 0.4 MG capsule, Take 2 capsules by mouth once daily, Disp: 180 Cap, Rfl: 1  •  amLODIPine (NORVASC) 10 MG Tab, Take 1 tablet by mouth once daily, Disp: 90 Tab, Rfl: 1  •  traZODone (DESYREL) 100 MG Tab, TAKE 1 TO 2 TABLETS BY MOUTH ONCE DAILY AT BEDTIME AS NEEDED FOR SLEEP, Disp: 180 Tab, Rfl: 1  •  atorvastatin (LIPITOR) 20 MG Tab, Take 1 Tab by mouth every day. To lower you risk of heart attacks and strokes., Disp: 90 Tab, Rfl: 3  •  celecoxib (CELEBREX) 200 MG Cap, Take 1 capsule by mouth twice daily, Disp: 180 Cap, Rfl: 1  •  losartan (COZAAR) 100 MG Tab, Take 1 Tab by mouth every day., Disp: 90 Each, Rfl: 1  •  amitriptyline (ELAVIL) 50 MG Tab, TAKE 1 TABLET BY MOUTH AT BEDTIME, Disp: 90 Tab, Rfl: 3  •  pioglitazone (ACTOS) 30 MG Tab, Take 1 Tab by mouth every day., Disp: 90 Tab, Rfl: 3  •  gabapentin (NEURONTIN) 300 MG Cap, TAKE 3 CAPSULES BY MOUTH THREE TIMES  DAILY, Disp: 810 Cap, Rfl: 3  •  insulin NPH (NOVOLIN N RELION) 100 UNIT/ML Suspension, Inject 15 Units as instructed 2 times a day. (Patient taking differently: Inject 30 Units under the skin 2 times a day.), Disp: 10 mL, Rfl: 11  •  Omega-3 Fatty Acids (FISH OIL PO), Take  by mouth every day., Disp: , Rfl:   •  FIBER PO, Take 2 Caps by mouth every day. Carlosmy, Disp: , Rfl:   •  Misc. Devices Misc, One touch ultra test strips. Patient has DM type 2 and tests twice per day, Disp: 100 Each, Rfl: 3  •  Misc. Devices Misc, One touch ultra lancets; patient has diabetes mellitus type 2 and test once per day, Disp: 100 Device, Rfl: 3  •  tizanidine (ZANAFLEX) 4 MG Tab, Take 1 Tab by mouth every 6 hours as needed (back pain). (Patient taking differently: Take 4 mg by mouth every day.), Disp: 270 Tab, Rfl: 3  •  omeprazole (PRILOSEC) 20 MG delayed-release capsule, TAKE ONE CAPSULE BY MOUTH ONCE DAILY, Disp: 90 Cap, Rfl: 3  •  Misc. Devices Misc, CPAP supplies; fax to [facility] [phone]., Disp: 100 Device, Rfl: 3  •  fluticasone (FLONASE) 50 MCG/ACT nasal spray, Spray 2 Sprays in nose every day. Each Nostril, Disp: 1 Bottle, Rfl: 3  •  albuterol (VENTOLIN OR PROVENTIL) 108 (90 BASE) MCG/ACT AERS inhalation aerosol, Inhale 2 Puffs by mouth every 6 hours as needed for Shortness of Breath., Disp: 8.5 g, Rfl: 3  •  Multiple Vitamins-Minerals (MULTIVITAMIN PO), Take 1 Tab by mouth every day.  , Disp: , Rfl:   •  Fexofenadine HCl (ALLEGRA PO), Take  by mouth as needed., Disp: , Rfl:   •  Cholecalciferol (VITAMIN D) 2000 UNIT CAPS, Take  by mouth every day., Disp: , Rfl:   •  GLUCOSAMINE-CHONDROITIN-MSM PO, Take 2 Tabs by mouth every day., Disp: , Rfl:   •  aspirin 81 MG tablet, Take 81 mg by mouth every day., Disp: , Rfl:     Labs: Reviewed    Physical Examination:  Vital signs: There were no vitals taken for this visit. There is no height or weight on file to calculate BMI.    Assessment and Plan:  -A1c above goal  -He would like  to keep things the same.     1. DM2    - Medication changes   Metformin: 1000 mg BID   GLP-1 Agent: Dulaglutide 1.5 mg once weekly   Thiazolidinedione: Pioglitazone 30 mg once daily   Basal Insulin: NPH 30 units QAM and QPM    Long-acting insulin:   Adjust dose according to FASTING BLOOD GLUCOSE target  mg/dL  (1) Increase the insulin dose every 3-4 days as needed.   (2) Increase by 2 units if FBG average concentration is 131-170 mg/dL.   (3) Increase by 4 units if FBG average concentration is 171-210 mg/dL.   (4) Increase by 6 units if FBG average concentration is 221-260 mg/dL.   (5) Increase by 8 units if FBG average concentration is greater than 261mg/dL and call us.   Consider cutting back by 1-2 units to previous dose if glucose concentration is below 80 mg/dL or symptoms of hypoglycemia.     -Blood glucose and A1c target    • However, more aggressive diabetic control is reasonable when tolerated.        2.  Cardiovascular    • pcp to manage             - Lifestyle changes       · Focus on eating a Mediterranean-style diet, as seen above  · Exercise 30 minutes daily at least 5 days/week, as tolerated.      Follow-up appointment in 5 weeks    Lamonte Stone, PharmD, MS, BCACP, LCC    [facility]  Phone [phone] fax [phone]    This note was created using voice recognition software (Dragon). The accuracy of the dictation is limited by the abilities of the software. I have reviewed the note prior to signing, however some errors in grammar and context are still possible. If you have any questions related to this note please do not hesitate to contact our office.     04/02/21    CC:   Raynn Street M.D.  No ref. provider found

## 2021-04-02 NOTE — PATIENT INSTRUCTIONS
- Medication changes   Metformin: 1000 mg BID   GLP-1 Agent: Dulaglutide 1.5 mg once weekly   Thiazolidinedione: Pioglitazone 30 mg once daily   Basal Insulin: NPH 30 units QAM and QPM    Long-acting insulin:   Adjust dose according to FASTING BLOOD GLUCOSE target  mg/dL  (1) Increase the insulin dose every 3-4 days as needed.   (2) Increase by 2 units if FBG average concentration is 131-170 mg/dL.   (3) Increase by 4 units if FBG average concentration is 171-210 mg/dL.   (4) Increase by 6 units if FBG average concentration is 221-260 mg/dL.   (5) Increase by 8 units if FBG average concentration is greater than 261mg/dL and call us.   Consider cutting back by 1-2 units to previous dose if glucose concentration is below 80 mg/dL or symptoms of hypoglycemia.     -Blood glucose and A1c target    • However, more aggressive diabetic control is reasonable when tolerated.              Lamonte Stone, PharmD, MS, BCACP, LCC    [facility]  Phone: [phone], Fax: [phone]    This note was created using voice recognition software (Dragon). The accuracy of the dictation is limited by the abilities of the software. I have reviewed the note prior to signing, however some errors in grammar and context are still possible. If you have any questions related to this note please do not hesitate to contact our office.

## 2021-04-03 LAB
CREAT UR-MCNC: 234.62 MG/DL
FERRITIN SERPL-MCNC: 24.3 NG/ML (ref 22–322)
IRON SATN MFR SERPL: 15 % (ref 15–55)
IRON SERPL-MCNC: 53 UG/DL (ref 50–180)
MICROALBUMIN UR-MCNC: 27.5 MG/DL
MICROALBUMIN/CREAT UR: 117 MG/G (ref 0–30)
TIBC SERPL-MCNC: 358 UG/DL (ref 250–450)
UIBC SERPL-MCNC: 305 UG/DL (ref 110–370)

## 2021-04-05 NOTE — RESULT ENCOUNTER NOTE
Released to [facility],  Your iron labs are normal! There is some protein in urine and the anemia is present but stable. Will review in detail at your visit with me tomorrow!  Ryann Street M.D.

## 2021-04-06 ENCOUNTER — OFFICE VISIT (OUTPATIENT)
Dept: MEDICAL GROUP | Facility: PHYSICIAN GROUP | Age: 61
End: 2021-04-06
Payer: COMMERCIAL

## 2021-04-06 VITALS
BODY MASS INDEX: 40.43 KG/M2 | RESPIRATION RATE: 16 BRPM | DIASTOLIC BLOOD PRESSURE: 60 MMHG | HEIGHT: 74 IN | HEART RATE: 82 BPM | SYSTOLIC BLOOD PRESSURE: 118 MMHG | WEIGHT: 315 LBS | OXYGEN SATURATION: 93 % | TEMPERATURE: 97.9 F

## 2021-04-06 DIAGNOSIS — G96.11 DURAL TEAR: ICD-10-CM

## 2021-04-06 PROCEDURE — 99214 OFFICE O/P EST MOD 30 MIN: CPT | Performed by: FAMILY MEDICINE

## 2021-04-06 RX ORDER — SULFAMETHOXAZOLE AND TRIMETHOPRIM 800; 160 MG/1; MG/1
TABLET ORAL
COMMUNITY
Start: 2021-01-24 | End: 2021-12-10

## 2021-04-06 RX ORDER — OXYCODONE HYDROCHLORIDE 15 MG/1
TABLET ORAL
COMMUNITY
Start: 2021-01-18

## 2021-04-06 RX ORDER — CEFDINIR 300 MG/1
CAPSULE ORAL
COMMUNITY
Start: 2021-04-01 | End: 2021-12-10

## 2021-04-06 RX ORDER — DOXYCYCLINE HYCLATE 100 MG/1
CAPSULE ORAL
COMMUNITY
Start: 2021-01-24 | End: 2021-12-10

## 2021-04-06 ASSESSMENT — FIBROSIS 4 INDEX: FIB4 SCORE: 0.86

## 2021-04-06 NOTE — PATIENT INSTRUCTIONS
Week one: Cut your trazodone in half and take half the dose for a week then stop the trazodone.     Week two: stop tizanidine.     Week three: decrease gabapentin to once a day for a week then every other day for a week then stop completely.

## 2021-04-06 NOTE — ASSESSMENT & PLAN NOTE
In Dec patient had neck surgery and then thoracic spine surgery with cages and discectomies.  After this patient had a fall which displaced the hardware, shortly after developed drainage of spinal fluid from the neck incision of his recent surgery. He had neck surgery again for a patch and revision of hardware.  Fluid drainage was persistent and significant.  Patient developed headaches and confusion.  He was taken to the ER. Anesthesiology placed a lumbar drain.  Patient has close follow-up with neurosurgery.  Today in clinic he is afebrile no symptoms of fevers chills.  Pain management done by his spine specialist.    He had a number of falls recently. Is in the process of getting into PT.     I am concerned for polypharmacy will discuss with patient discontinuing potentially some of his medications. He will wean off trazodone first. Takes this and sertraline and elavil and tizanidine and gabapentin for sleep and pain.   Also advised he can stop next the tizanidine by weaning off and next stop the gabapentin with a slow wean over a month.

## 2021-04-06 NOTE — PROGRESS NOTES
Subjective:   Jamey Riojas is a 60 y.o. male here today for evaluation and management of:     Dural tear  In Dec patient had neck surgery and then thoracic spine surgery with cages and discectomies.  After this patient had a fall which displaced the hardware, shortly after developed drainage of spinal fluid from the neck incision of his recent surgery. He had neck surgery again for a patch and revision of hardware.  Fluid drainage was persistent and significant.  Patient developed headaches and confusion.  He was taken to the ER. Anesthesiology placed a lumbar drain.  Patient has close follow-up with neurosurgery.  Today in clinic he is afebrile no symptoms of fevers chills.  Pain management done by his spine specialist.    He had a number of falls recently. Is in the process of getting into PT.     I am concerned for polypharmacy will discuss with patient discontinuing potentially some of his medications. He will wean off trazodone first. Takes this and sertraline and elavil and tizanidine and gabapentin for sleep and pain.   Also advised he can stop next the tizanidine by weaning off and next stop the gabapentin with a slow wean over a month.          Current medicines (including changes today)  Current Outpatient Medications   Medication Sig Dispense Refill   • cefdinir (OMNICEF) 300 MG Cap      • metoprolol SR (TOPROL XL) 25 MG TABLET SR 24 HR Take 1 tablet by mouth once daily 90 tablet 3   • sertraline (ZOLOFT) 50 MG Tab Take 1 tablet by mouth once daily 90 tablet 3   • Dulaglutide (TRULICITY) 1.5 MG/0.5ML Solution Pen-injector Inject 0.5 mL under the skin every 7 days. 2 mL 3   • oxyCODONE-acetaminophen (PERCOCET-10)  MG Tab TAKE 1 2 TO 1 (ONE HALF TO ONE) TABLET BY MOUTH EVERY 4 HOURS FOR 10 DAYS AS NEEDED FOR PAIN (MODERATE 4 6)     • ferrous sulfate 325 (65 Fe) MG EC tablet Take 1 Tab by mouth every day. 90 Tab 1   • metformin (GLUCOPHAGE) 1000 MG tablet TAKE 1 TABLET BY MOUTH TWICE DAILY  WITH MEALS 180 Tab 1   • finasteride (PROSCAR) 5 MG Tab Take 1 tablet by mouth once daily 90 Tab 1   • tamsulosin (FLOMAX) 0.4 MG capsule Take 2 capsules by mouth once daily 180 Cap 1   • amLODIPine (NORVASC) 10 MG Tab Take 1 tablet by mouth once daily 90 Tab 1   • traZODone (DESYREL) 100 MG Tab TAKE 1 TO 2 TABLETS BY MOUTH ONCE DAILY AT BEDTIME AS NEEDED FOR SLEEP 180 Tab 1   • atorvastatin (LIPITOR) 20 MG Tab Take 1 Tab by mouth every day. To lower you risk of heart attacks and strokes. 90 Tab 3   • celecoxib (CELEBREX) 200 MG Cap Take 1 capsule by mouth twice daily 180 Cap 1   • losartan (COZAAR) 100 MG Tab Take 1 Tab by mouth every day. 90 Each 1   • amitriptyline (ELAVIL) 50 MG Tab TAKE 1 TABLET BY MOUTH AT BEDTIME 90 Tab 3   • pioglitazone (ACTOS) 30 MG Tab Take 1 Tab by mouth every day. 90 Tab 3   • gabapentin (NEURONTIN) 300 MG Cap TAKE 3 CAPSULES BY MOUTH THREE TIMES DAILY 810 Cap 3   • insulin NPH (NOVOLIN N RELION) 100 UNIT/ML Suspension Inject 15 Units as instructed 2 times a day. (Patient taking differently: Inject 30 Units under the skin 2 times a day.) 10 mL 11   • Omega-3 Fatty Acids (FISH OIL PO) Take  by mouth every day.     • FIBER PO Take 2 Caps by mouth every day. Gummy     • Misc. Devices Misc One touch ultra test strips. Patient has DM type 2 and tests twice per day 100 Each 3   • Misc. Devices Misc One touch ultra lancets; patient has diabetes mellitus type 2 and test once per day 100 Device 3   • tizanidine (ZANAFLEX) 4 MG Tab Take 1 Tab by mouth every 6 hours as needed (back pain). (Patient taking differently: Take 4 mg by mouth every day.) 270 Tab 3   • omeprazole (PRILOSEC) 20 MG delayed-release capsule TAKE ONE CAPSULE BY MOUTH ONCE DAILY 90 Cap 3   • Misc. Devices Misc CPAP supplies; fax to [facility] [phone]. 100 Device 3   • fluticasone (FLONASE) 50 MCG/ACT nasal spray Spray 2 Sprays in nose every day. Each Nostril 1 Bottle 3   • albuterol (VENTOLIN OR PROVENTIL) 108 (90 BASE) MCG/ACT  "AERS inhalation aerosol Inhale 2 Puffs by mouth every 6 hours as needed for Shortness of Breath. 8.5 g 3   • Multiple Vitamins-Minerals (MULTIVITAMIN PO) Take 1 Tab by mouth every day.       • Fexofenadine HCl (ALLEGRA PO) Take  by mouth as needed.     • Cholecalciferol (VITAMIN D) 2000 UNIT CAPS Take  by mouth every day.     • GLUCOSAMINE-CHONDROITIN-MSM PO Take 2 Tabs by mouth every day.     • aspirin 81 MG tablet Take 81 mg by mouth every day.       No current facility-administered medications for this visit.     He  has a past medical history of Anesthesia, Anxiety, Arthritis, Body aches (11/19/2014), Bronchitis (2009), Chickenpox, Diabetes, Dysuria (7/30/2014), Enlarged liver, GERD (gastroesophageal reflux disease) (12/21/2011), Heart burn, Hypertension, Indigestion, Influenza, Insomnia (12/21/2011), Nephrolithiasis (7/30/2014), Obesity, BRAIN (obstructive sleep apnea), Pain (12-14-12), Psychiatric problem, Sleep apnea, and Snoring.    ROS  No chest pain, no shortness of breath, no abdominal pain       Objective:     /60   Pulse 82   Temp 36.6 °C (97.9 °F) (Temporal)   Resp 16   Ht 1.88 m (6' 2\")   Wt (!) 147 kg (324 lb)   SpO2 93%  Body mass index is 41.6 kg/m².   Physical Exam:  Constitutional: Alert, no distress.  Skin: Warm, dry, good turgor, no rashes in visible areas.  Eye: Equal, round and reactive, conjunctiva clear, lids normal.  ENMT: Lips without lesions, good dentition, oropharynx clear.  Neck: Trachea midline, no masses, no thyromegaly. No cervical or supraclavicular lymphadenopathy  Respiratory: Unlabored respiratory effort, lungs clear to auscultation, no wheezes, no ronchi.  Cardiovascular: Normal S1, S2, no murmur, no edema.  Abdomen: Soft, non-tender, no masses, no hepatosplenomegaly.  Psych: Alert and oriented x3, normal affect and mood.        Assessment and Plan:   The following treatment plan was discussed    1. Dural tear  Recovering well. Follow up with neurosurgery as " scheduled.     2. [facility] pharmacy: advised to wean off trazodone, then tizanidine (can stay on this as needed), and gabapentin over next few months one at a time.       Followup: Return in about 3 months (around 7/6/2021) for DM, med review.

## 2021-04-13 RX ORDER — LOSARTAN POTASSIUM 100 MG/1
TABLET ORAL
Qty: 90 TABLET | Refills: 3 | Status: SHIPPED | OUTPATIENT
Start: 2021-04-13 | End: 2022-03-14 | Stop reason: SDUPTHER

## 2021-04-23 ENCOUNTER — HOSPITAL ENCOUNTER (OUTPATIENT)
Dept: LAB | Facility: MEDICAL CENTER | Age: 61
End: 2021-04-23
Attending: INTERNAL MEDICINE
Payer: COMMERCIAL

## 2021-04-23 PROCEDURE — 36415 COLL VENOUS BLD VENIPUNCTURE: CPT

## 2021-04-23 PROCEDURE — 85652 RBC SED RATE AUTOMATED: CPT

## 2021-04-23 PROCEDURE — 85025 COMPLETE CBC W/AUTO DIFF WBC: CPT

## 2021-04-23 PROCEDURE — 80053 COMPREHEN METABOLIC PANEL: CPT

## 2021-04-23 PROCEDURE — 86140 C-REACTIVE PROTEIN: CPT

## 2021-04-24 LAB
ALBUMIN SERPL BCP-MCNC: 4.1 G/DL (ref 3.2–4.9)
ALBUMIN/GLOB SERPL: 1.2 G/DL
ALP SERPL-CCNC: 130 U/L (ref 30–99)
ALT SERPL-CCNC: 15 U/L (ref 2–50)
ANION GAP SERPL CALC-SCNC: 11 MMOL/L (ref 7–16)
AST SERPL-CCNC: 18 U/L (ref 12–45)
BASOPHILS # BLD AUTO: 0.6 % (ref 0–1.8)
BASOPHILS # BLD: 0.03 K/UL (ref 0–0.12)
BILIRUB SERPL-MCNC: 0.8 MG/DL (ref 0.1–1.5)
BUN SERPL-MCNC: 15 MG/DL (ref 8–22)
CALCIUM SERPL-MCNC: 9.5 MG/DL (ref 8.5–10.5)
CHLORIDE SERPL-SCNC: 97 MMOL/L (ref 96–112)
CO2 SERPL-SCNC: 26 MMOL/L (ref 20–33)
CREAT SERPL-MCNC: 0.81 MG/DL (ref 0.5–1.4)
CRP SERPL HS-MCNC: 0.57 MG/DL (ref 0–0.75)
EOSINOPHIL # BLD AUTO: 0.19 K/UL (ref 0–0.51)
EOSINOPHIL NFR BLD: 3.6 % (ref 0–6.9)
ERYTHROCYTE [DISTWIDTH] IN BLOOD BY AUTOMATED COUNT: 52.2 FL (ref 35.9–50)
ERYTHROCYTE [SEDIMENTATION RATE] IN BLOOD BY WESTERGREN METHOD: 20 MM/HOUR (ref 0–20)
FASTING STATUS PATIENT QL REPORTED: NORMAL
GLOBULIN SER CALC-MCNC: 3.3 G/DL (ref 1.9–3.5)
GLUCOSE SERPL-MCNC: 215 MG/DL (ref 65–99)
HCT VFR BLD AUTO: 38.2 % (ref 42–52)
HGB BLD-MCNC: 11.6 G/DL (ref 14–18)
IMM GRANULOCYTES # BLD AUTO: 0.04 K/UL (ref 0–0.11)
IMM GRANULOCYTES NFR BLD AUTO: 0.8 % (ref 0–0.9)
LYMPHOCYTES # BLD AUTO: 1.35 K/UL (ref 1–4.8)
LYMPHOCYTES NFR BLD: 25.7 % (ref 22–41)
MCH RBC QN AUTO: 24.3 PG (ref 27–33)
MCHC RBC AUTO-ENTMCNC: 30.4 G/DL (ref 33.7–35.3)
MCV RBC AUTO: 80.1 FL (ref 81.4–97.8)
MONOCYTES # BLD AUTO: 0.47 K/UL (ref 0–0.85)
MONOCYTES NFR BLD AUTO: 9 % (ref 0–13.4)
NEUTROPHILS # BLD AUTO: 3.17 K/UL (ref 1.82–7.42)
NEUTROPHILS NFR BLD: 60.3 % (ref 44–72)
NRBC # BLD AUTO: 0 K/UL
NRBC BLD-RTO: 0 /100 WBC
PLATELET # BLD AUTO: 212 K/UL (ref 164–446)
PMV BLD AUTO: 9.8 FL (ref 9–12.9)
POTASSIUM SERPL-SCNC: 4.2 MMOL/L (ref 3.6–5.5)
PROT SERPL-MCNC: 7.4 G/DL (ref 6–8.2)
RBC # BLD AUTO: 4.77 M/UL (ref 4.7–6.1)
SODIUM SERPL-SCNC: 134 MMOL/L (ref 135–145)
WBC # BLD AUTO: 5.3 K/UL (ref 4.8–10.8)

## 2021-05-07 ENCOUNTER — NON-PROVIDER VISIT (OUTPATIENT)
Dept: MEDICAL GROUP | Facility: PHYSICIAN GROUP | Age: 61
End: 2021-05-07
Payer: COMMERCIAL

## 2021-05-07 DIAGNOSIS — E11.69 DIABETES MELLITUS TYPE 2 IN OBESE: ICD-10-CM

## 2021-05-07 DIAGNOSIS — E66.9 DIABETES MELLITUS TYPE 2 IN OBESE: ICD-10-CM

## 2021-05-07 LAB
HBA1C MFR BLD: 7.5 % (ref 0–5.6)
INT CON NEG: ABNORMAL
INT CON POS: ABNORMAL

## 2021-05-07 PROCEDURE — 83036 HEMOGLOBIN GLYCOSYLATED A1C: CPT | Performed by: FAMILY MEDICINE

## 2021-05-07 PROCEDURE — 99401 PREV MED CNSL INDIV APPRX 15: CPT | Performed by: FAMILY MEDICINE

## 2021-05-07 RX ORDER — DULAGLUTIDE 3 MG/.5ML
3 INJECTION, SOLUTION SUBCUTANEOUS
Qty: 2 ML | Refills: 1 | Status: SHIPPED | OUTPATIENT
Start: 2021-05-07 | End: 2021-07-16 | Stop reason: SDUPTHER

## 2021-05-07 NOTE — NON-PROVIDER
Patient Consult Note    TIME IN: 235    Primary care physician: Ryann Street M.D.    Reason for consult: Management of Uncontrolled Type 2 Diabetes    HPI:  Jamey Riojas is a 60 y.o. old patient who comes in today for evaluation of above stated problem.    Most Recent labs and A1c:    Lab Results   Component Value Date/Time    HBA1C 7.5 (A) 05/07/2021 02:56 PM          Lab Results   Component Value Date/Time    CHOLSTRLTOT 159 10/02/2020 09:40 AM    LDL 68 10/02/2020 09:40 AM    HDL 37 (A) 10/02/2020 09:40 AM    TRIGLYCERIDE 268 (H) 10/02/2020 09:40 AM       Lab Results   Component Value Date/Time    SODIUM 134 (L) 04/23/2021 01:11 PM    POTASSIUM 4.2 04/23/2021 01:11 PM    CHLORIDE 97 04/23/2021 01:11 PM    CO2 26 04/23/2021 01:11 PM    GLUCOSE 215 (H) 04/23/2021 01:11 PM    BUN 15 04/23/2021 01:11 PM    CREATININE 0.81 04/23/2021 01:11 PM    CREATININE 0.8 09/10/2007 10:15 AM     Lab Results   Component Value Date/Time    ALKPHOSPHAT 130 (H) 04/23/2021 01:11 PM    ASTSGOT 18 04/23/2021 01:11 PM    ALTSGPT 15 04/23/2021 01:11 PM    TBILIRUBIN 0.8 04/23/2021 01:11 PM    ALBUMIN 4.1 04/23/2021 01:11 PM      No components found for: MICROALBCREATRATIO    Medications:    Current Outpatient Medications:   •  Trulicity, 3 mg, Subcutaneous, Q7 DAYS  •  zolpidem, TAKE 1 TABLET BY MOUTH ONCE DAILY AT BEDTIME AS NEEDED FOR  SLEEP  •  losartan, Take 1 tablet by mouth once daily  •  cefdinir,   •  doxycycline,   •  oxycodone,   •  sulfamethoxazole-trimethoprim,   •  metoprolol SR, Take 1 tablet by mouth once daily  •  sertraline, Take 1 tablet by mouth once daily  •  oxyCODONE-acetaminophen, TAKE 1 2 TO 1 (ONE HALF TO ONE) TABLET BY MOUTH EVERY 4 HOURS FOR 10 DAYS AS NEEDED FOR PAIN (MODERATE 4 6)  •  ferrous sulfate, 325 mg, Oral, DAILY  •  metformin, TAKE 1 TABLET BY MOUTH TWICE DAILY WITH MEALS  •  finasteride, Take 1 tablet by mouth once daily  •  tamsulosin, Take 2 capsules by mouth once daily  •  amLODIPine,  Take 1 tablet by mouth once daily  •  traZODone, TAKE 1 TO 2 TABLETS BY MOUTH ONCE DAILY AT BEDTIME AS NEEDED FOR SLEEP  •  atorvastatin, 20 mg, Oral, DAILY  •  celecoxib, Take 1 capsule by mouth twice daily  •  amitriptyline, TAKE 1 TABLET BY MOUTH AT BEDTIME  •  pioglitazone, 30 mg, Oral, DAILY  •  gabapentin, TAKE 3 CAPSULES BY MOUTH THREE TIMES DAILY  •  insulin NPH, 15 Units, Subcutaneous, BID (Patient taking differently: 30 Units, Subcutaneous, 2 TIMES DAILY)  •  Omega-3 Fatty Acids (FISH OIL PO), Take  by mouth every day.  •  FIBER PO, 2 capsule, Oral, DAILY  •  Misc. Devices, One touch ultra test strips. Patient has DM type 2 and tests twice per day  •  Misc. Devices, One touch ultra lancets; patient has diabetes mellitus type 2 and test once per day  •  tizanidine, 4 mg, Oral, Q6HRS PRN (Patient taking differently: 4 mg, Oral, DAILY)  •  omeprazole, TAKE ONE CAPSULE BY MOUTH ONCE DAILY  •  Misc. Devices, CPAP supplies; fax to [place] [phone].  •  fluticasone, 2 Spray, Nasal, DAILY  •  albuterol, 2 Puff, Inhalation, Q6HRS PRN  •  Multiple Vitamins-Minerals (MULTIVITAMIN PO), 1 tablet, Oral, DAILY  •  Fexofenadine HCl (ALLEGRA PO), Take  by mouth as needed.  •  Vitamin D, Take  by mouth every day.  •  GLUCOSAMINE-CHONDROITIN-MSM PO, 2 tablet, Oral, DAILY  •  aspirin, 81 mg, Oral, DAILY    Preventative Management  BP regimen (ACE/ARB) -losartan 100 mg once daily  ASA -81 mg once daily  Statin -Lipitor 20 mg once daily  Last Retinal Scan -up-to-date  Last Foot Exam -up-to-date    Diabetes Medication History and Current Regimen  Metformin: 1000 mg BID   GLP-1 Agent: Dulaglutide 1.5 mg once weekly   Thiazolidinedione: Pioglitazone 30 mg once daily   Basal Insulin: NPH 30 units QAM and QPM    Pt has home glucometer and proper testing technique - y    Pt reports blood sugars:   Before Breakfast: 160  Before Lunch:   Before Dinner:   Before Bedtime: 300  Other times:     Hypoglycemia awareness -yes  Nocturnal  hypoglycemia-yes  Hypoglycemia:  None    Current Exercise - PT    Dietary - fast food.     Foot Exam:  Completed     updated caregaps  Immunization History   Administered Date(s) Administered   • Influenza Vaccine Pediatric Split - Historical Data 11/20/2006   • Influenza Vaccine Quad Inj (Pf) 10/24/2017   • Influenza, Unspecified - HISTORICAL DATA 11/20/2006   • Pneumococcal polysaccharide vaccine (PPSV-23) 12/30/2005   • Tdap Vaccine 11/08/2015           Past Medical History:  Patient Active Problem List    Diagnosis Date Noted   • Degeneration of cervical intervertebral disc 11/30/2012   • Obstructive sleep apnea 12/02/2012   • Iron deficiency anemia 12/02/2012   • HTN (hypertension) 12/21/2011   • Major depression 12/02/2012   • Vitamin D deficiency disease 01/12/2012   • Dural tear 04/06/2021   • H/O cervical spine surgery 01/15/2021   • Memory problem 10/09/2020   • History of colon polyps 11/13/2019   • Flank pain 09/12/2019   • Left lower quadrant pain 08/15/2019   • Dyslipidemia 07/26/2018   • Morbid obesity with BMI of 40.0-44.9, adult (HCC) 05/10/2018   • H/O colonoscopy with polypectomy 08/17/2017   • Chronic use of opiate drug for therapeutic purpose 08/11/2016   • Lumbar stenosis with neurogenic claudication 12/31/2015   • Anemia of unknown etiology 05/21/2015   • Sinusitis 04/21/2015   • Body aches 11/19/2014   • Nephrolithiasis 07/30/2014   • BPH (benign prostatic hyperplasia) 12/20/2013   • Degeneration of lumbar or lumbosacral intervertebral disc 12/30/2012   • Chronic pain of left knee 09/18/2012   • Diabetes mellitus type 2 in obese (HCC) 12/21/2011   • Insomnia 12/21/2011   • GERD (gastroesophageal reflux disease) 12/21/2011   • Chronic back pain 12/21/2011       Past Surgical History:  Past Surgical History:   Procedure Laterality Date   • COLONOSCOPY  11/13/2019    Procedure: COLONOSCOPY;  Surgeon: Buddy Diaz M.D.;  Location: SURGERY [facility];  Service: Gastroenterology   •  LUMBAR FUSION POSTERIOR  12/30/2012    Performed by Benjamin Kumar M.D. at SURGERY [facility]   • CERVICAL DISK AND FUSION ANTERIOR  11/30/2012    Performed by Benjamin Kumar M.D. at SURGERY [facility]   • HARDWARE REMOVAL ORTHO  11/30/2012    Performed by Benjamin Kumar M.D. at SURGERY [facility]   • KNEE ARTHROSCOPY  9/18/2012    Performed by James Francis M.D. at SURGERY [facility]   • MEDIAL MENISCECTOMY  9/18/2012    Performed by James Francis M.D. at SURGERY [facility]   • DEBRIDEMENT  9/18/2012    Performed by James Francis M.D. at SURGERY [facility]   • LUMBAR FUSION POSTERIOR  6/18/2010    Performed by BENJAMIN KUMAR at SURGERY [facility]   • LUMBAR DECOMPRESSION  6/18/2010    Performed by BENJAMIN KUMAR at SURGERY [facility]   • KNEE ARTHROSCOPY  10/20/2009    Performed by JAMES FRANCIS at SURGERY [facility]   • MEDIAL MENISCECTOMY  10/20/2009    Performed by JAMES FRANCIS at SURGERY [facility]   • CERVICAL FUSION POSTERIOR  9/2007    c -3-4-5    • CHOLECYSTECTOMY  1984    open   • FOOT SURGERY      left  Farrar's neuroma and heel spur       Allergies:  Penicillins, Phenergan [promethazine hcl], and Influenza virus vacc    Social History:  Social History     Socioeconomic History   • Marital status:      Spouse name: Not on file   • Number of children: Not on file   • Years of education: Not on file   • Highest education level: Not on file   Occupational History   • Not on file   Tobacco Use   • Smoking status: Never Smoker   • Smokeless tobacco: Never Used   Substance and Sexual Activity   • Alcohol use: No     Alcohol/week: 0.0 oz   • Drug use: No   • Sexual activity: Yes     Partners: Female   Other Topics Concern   •  Service No   • Blood Transfusions Yes   • Caffeine Concern No   • Occupational Exposure No   • Hobby Hazards No   • Sleep Concern Yes   • Stress Concern Yes   • Weight Concern Yes   • Special  Diet No   • Back Care No   • Exercise No   • Bike Helmet No   • Seat Belt Yes   • Self-Exams Yes   Social History Narrative   • Not on file     Social Determinants of Health     Financial Resource Strain:    • Difficulty of Paying Living Expenses:    Food Insecurity:    • Worried About Running Out of Food in the Last Year:    • Ran Out of Food in the Last Year:    Transportation Needs:    • Lack of Transportation (Medical):    • Lack of Transportation (Non-Medical):    Physical Activity:    • Days of Exercise per Week:    • Minutes of Exercise per Session:    Stress:    • Feeling of Stress :    Social Connections:    • Frequency of Communication with Friends and Family:    • Frequency of Social Gatherings with Friends and Family:    • Attends [employer] Services:    • Active Member of Clubs or Organizations:    • Attends Club or Organization Meetings:    • Marital Status:    Intimate Partner Violence:    • Fear of Current or Ex-Partner:    • Emotionally Abused:    • Physically Abused:    • Sexually Abused:        Family History:  Family History   Problem Relation Age of Onset   • Other Mother         osteopenia   • Hypertension Mother    • Psychiatric Illness Mother    • Other Father         brain tumor   • Heart Attack Maternal Grandfather    • Sleep Apnea Neg Hx        Physical Examination:  Vital signs: There were no vitals taken for this visit. There is no height or weight on file to calculate BMI.      Assessment and Plan:  -A1c has improved since last appointment but still not at goal.  -We will increase his Trulicity with the hopes that we can improve his A1c and decrease his insulin usage.    1. DM2 medication changes in bold :  Metformin: 1000 mg BID   GLP-1 Agent: Dulaglutide 3mg once weekly   Thiazolidinedione: Pioglitazone 30 mg once daily   Basal Insulin: NPH 30 units QAM and QPM    -Blood glucose and A1c target    • However, more aggressive diabetic control is reasonable when tolerated.  • Pt's  treatment of Hypoglycemia. 15:15 Rule discussed with patient      2.  Cardiovascular medication changes in bold:  • pcp to manage       3.  Lifestyle changes   · Focus on eating a DASH/Mediterranean-style diet.   · Exercise 30 minutes daily at least 5 days/week, as tolerated.        Follow-up appointment in 8 weeks    aLmonte Stone, PharmD, MS, BCACP, [facility]  Phone [phone] fax [phone]    This note was created using voice recognition software (Dragon). The accuracy of the dictation is limited by the abilities of the software. I have reviewed the note prior to signing, however some errors in grammar and context are still possible. If you have any questions related to this note please do not hesitate to contact our office.     05/07/21    CC:   Ryann Street M.D.  No ref. provider found  Dr. Shawn Murphy    TIME OUT: 258  Time with Pt  23 min

## 2021-05-07 NOTE — Clinical Note
Dr. Street,  His A1c has improved but still not at goal.  I will increase his Trulicity.  He has an appointment scheduled with you in approximately 2 months but they are afraid [facility] will drop the coverage with [facility].  I told him not to cancel any appointments yet and wait to see if this contract dispute is resolved.  Thanks  Vazquez

## 2021-05-12 ENCOUNTER — TELEPHONE (OUTPATIENT)
Dept: MEDICAL GROUP | Facility: PHYSICIAN GROUP | Age: 61
End: 2021-05-12

## 2021-07-15 ENCOUNTER — OFFICE VISIT (OUTPATIENT)
Dept: MEDICAL GROUP | Facility: PHYSICIAN GROUP | Age: 61
End: 2021-07-15
Payer: COMMERCIAL

## 2021-07-15 VITALS
WEIGHT: 315 LBS | DIASTOLIC BLOOD PRESSURE: 64 MMHG | TEMPERATURE: 98.1 F | RESPIRATION RATE: 20 BRPM | HEIGHT: 74 IN | HEART RATE: 85 BPM | SYSTOLIC BLOOD PRESSURE: 112 MMHG | BODY MASS INDEX: 40.43 KG/M2 | OXYGEN SATURATION: 96 %

## 2021-07-15 DIAGNOSIS — E11.69 DIABETES MELLITUS TYPE 2 IN OBESE: ICD-10-CM

## 2021-07-15 DIAGNOSIS — E61.1 IRON DEFICIENCY: ICD-10-CM

## 2021-07-15 DIAGNOSIS — M51.37 DEGENERATION OF LUMBAR OR LUMBOSACRAL INTERVERTEBRAL DISC: ICD-10-CM

## 2021-07-15 DIAGNOSIS — D50.9 IRON DEFICIENCY ANEMIA, UNSPECIFIED IRON DEFICIENCY ANEMIA TYPE: ICD-10-CM

## 2021-07-15 DIAGNOSIS — E55.9 VITAMIN D DEFICIENCY: ICD-10-CM

## 2021-07-15 DIAGNOSIS — M50.30 DEGENERATION OF CERVICAL INTERVERTEBRAL DISC: ICD-10-CM

## 2021-07-15 DIAGNOSIS — E66.9 DIABETES MELLITUS TYPE 2 IN OBESE: ICD-10-CM

## 2021-07-15 PROCEDURE — 99214 OFFICE O/P EST MOD 30 MIN: CPT | Performed by: FAMILY MEDICINE

## 2021-07-15 RX ORDER — MULTIVIT WITH MINERALS/LUTEIN
TABLET ORAL
COMMUNITY

## 2021-07-15 RX ORDER — LANOLIN ALCOHOL/MO/W.PET/CERES
325 CREAM (GRAM) TOPICAL DAILY
Qty: 90 TABLET | Refills: 3 | Status: SHIPPED | OUTPATIENT
Start: 2021-07-15 | End: 2021-08-09 | Stop reason: SDUPTHER

## 2021-07-15 RX ORDER — ASCORBIC ACID 500 MG
500 TABLET ORAL DAILY
Qty: 100 TABLET | Refills: 5 | Status: SHIPPED | OUTPATIENT
Start: 2021-07-15 | End: 2021-08-09 | Stop reason: SDUPTHER

## 2021-07-15 ASSESSMENT — FIBROSIS 4 INDEX: FIB4 SCORE: 1.29

## 2021-07-15 NOTE — ASSESSMENT & PLAN NOTE
Patient reports normal colonoscopy in 2020   Records requested.   He has normal renal function  rx for iron supplement refilled.

## 2021-07-15 NOTE — ASSESSMENT & PLAN NOTE
A1c:   Lab Results   Component Value Date/Time    HBA1C 7.5 (A) 05/07/2021 1456    AVGLUC 192 01/11/2021 1313     Lipids:   Lab Results   Component Value Date/Time    CHOLSTRLTOT 159 10/02/2020 09:40 AM    TRIGLYCERIDE 268 (H) 10/02/2020 09:40 AM    HDL 37 (A) 10/02/2020 09:40 AM    LDL 68 10/02/2020 09:40 AM   ]  BMP:   Lab Results   Component Value Date/Time    SODIUM 134 (L) 04/23/2021 1311    POTASSIUM 4.2 04/23/2021 1311    CHLORIDE 97 04/23/2021 1311    CO2 26 04/23/2021 1311    GLUCOSE 215 (H) 04/23/2021 1311    BUN 15 04/23/2021 1311    CREATININE 0.81 04/23/2021 1311    CALCIUM 9.5 04/23/2021 1311    ANION 11.0 04/23/2021 1311     GFR:   Lab Results   Component Value Date/Time    IFA[place] >60 04/23/2021 1311    IFNOTAFR >60 04/23/2021 1311   Good improvement under the care of his endocrinologist.   Retinal screening completed this year  No ulcers or callus of feet.

## 2021-07-15 NOTE — PROGRESS NOTES
Subjective:   Jamey Riojas is a 61 y.o. male here today for evaluation and management of:     Degeneration of lumbar or lumbosacral intervertebral disc  Chronic back pain, constant, is on SS disability.     Degeneration of cervical intervertebral disc  Chronic pain, multiple surgeries, is on SS disability   Walks with a walker.     Diabetes mellitus type 2 in obese  A1c:   Lab Results   Component Value Date/Time    HBA1C 7.5 (A) 05/07/2021 1456    AVGLUC 192 01/11/2021 1313     Lipids:   Lab Results   Component Value Date/Time    CHOLSTRLTOT 159 10/02/2020 09:40 AM    TRIGLYCERIDE 268 (H) 10/02/2020 09:40 AM    HDL 37 (A) 10/02/2020 09:40 AM    LDL 68 10/02/2020 09:40 AM   ]  BMP:   Lab Results   Component Value Date/Time    SODIUM 134 (L) 04/23/2021 1311    POTASSIUM 4.2 04/23/2021 1311    CHLORIDE 97 04/23/2021 1311    CO2 26 04/23/2021 1311    GLUCOSE 215 (H) 04/23/2021 1311    BUN 15 04/23/2021 1311    CREATININE 0.81 04/23/2021 1311    CALCIUM 9.5 04/23/2021 1311    ANION 11.0 04/23/2021 1311     GFR:   Lab Results   Component Value Date/Time    IF[place] >60 04/23/2021 1311    IFNOTAFR >60 04/23/2021 1311   Good improvement under the care of his endocrinologist.   Retinal screening completed this year  No ulcers or callus of feet.         Vitamin D deficiency disease  Normal level this year with his vit d supplement.     Iron deficiency anemia  Patient reports normal colonoscopy in 2020   Records requested.   He has normal renal function  rx for iron supplement refilled.          Current medicines (including changes today)  Current Outpatient Medications   Medication Sig Dispense Refill   • Fe Bisgly-Succ-C-Thre-B12-FA (IRON 21/7 PO) Take  by mouth.     • Ascorbic Acid (VITAMIN C) 1000 MG Tab Take  by mouth.     • ascorbic acid (VITAMIN C) 500 MG tablet Take 1 tablet by mouth every day. 100 tablet 5   • ferrous sulfate 325 (65 Fe) MG EC tablet Take 1 tablet by mouth every day. 90 tablet 3   • traZODone  (DESYREL) 100 MG Tab TAKE 1 TO 2 TABLETS BY MOUTH ONCE DAILY AT BEDTIME AS NEEDED FOR SLEEP 180 tablet 3   • tamsulosin (FLOMAX) 0.4 MG capsule Take 2 capsules by mouth once daily 180 capsule 3   • celecoxib (CELEBREX) 200 MG Cap Take 1 capsule by mouth twice daily 180 capsule 3   • finasteride (PROSCAR) 5 MG Tab Take 1 tablet by mouth once daily 90 tablet 3   • amLODIPine (NORVASC) 10 MG Tab Take 1 tablet by mouth once daily 90 tablet 3   • metformin (GLUCOPHAGE) 1000 MG tablet TAKE 1 TABLET BY MOUTH TWICE DAILY WITH MEALS 180 tablet 3   • Dulaglutide (TRULICITY) 3 MG/0.5ML Solution Pen-injector Inject 3 mg under the skin every 7 days. 2 mL 1   • zolpidem (AMBIEN) 10 MG Tab TAKE 1 TABLET BY MOUTH ONCE DAILY AT BEDTIME AS NEEDED FOR  SLEEP 90 tablet 1   • losartan (COZAAR) 100 MG Tab Take 1 tablet by mouth once daily 90 tablet 3   • cefdinir (OMNICEF) 300 MG Cap      • doxycycline (VIBRAMYCIN) 100 MG Cap      • oxycodone (OXY-IR) 15 MG immediate release tablet      • sulfamethoxazole-trimethoprim (BACTRIM DS) 800-160 MG tablet      • metoprolol SR (TOPROL XL) 25 MG TABLET SR 24 HR Take 1 tablet by mouth once daily 90 tablet 3   • sertraline (ZOLOFT) 50 MG Tab Take 1 tablet by mouth once daily 90 tablet 3   • oxyCODONE-acetaminophen (PERCOCET-10)  MG Tab TAKE 1 2 TO 1 (ONE HALF TO ONE) TABLET BY MOUTH EVERY 4 HOURS FOR 10 DAYS AS NEEDED FOR PAIN (MODERATE 4 6)     • atorvastatin (LIPITOR) 20 MG Tab Take 1 Tab by mouth every day. To lower you risk of heart attacks and strokes. 90 Tab 3   • amitriptyline (ELAVIL) 50 MG Tab TAKE 1 TABLET BY MOUTH AT BEDTIME 90 Tab 3   • pioglitazone (ACTOS) 30 MG Tab Take 1 Tab by mouth every day. 90 Tab 3   • gabapentin (NEURONTIN) 300 MG Cap TAKE 3 CAPSULES BY MOUTH THREE TIMES DAILY 810 Cap 3   • insulin NPH (NOVOLIN N RELION) 100 UNIT/ML Suspension Inject 15 Units as instructed 2 times a day. (Patient taking differently: Inject 30 Units under the skin 2 times a day.) 10 mL 11    • Omega-3 Fatty Acids (FISH OIL PO) Take  by mouth every day.     • FIBER PO Take 2 Caps by mouth every day. Gummy     • Misc. Devices Misc One touch ultra test strips. Patient has DM type 2 and tests twice per day 100 Each 3   • Misc. Devices Misc One touch ultra lancets; patient has diabetes mellitus type 2 and test once per day 100 Device 3   • tizanidine (ZANAFLEX) 4 MG Tab Take 1 Tab by mouth every 6 hours as needed (back pain). (Patient taking differently: Take 4 mg by mouth every day.) 270 Tab 3   • omeprazole (PRILOSEC) 20 MG delayed-release capsule TAKE ONE CAPSULE BY MOUTH ONCE DAILY 90 Cap 3   • Misc. Devices Misc CPAP supplies; fax to [facility] [phone]. 100 Device 3   • fluticasone (FLONASE) 50 MCG/ACT nasal spray Spray 2 Sprays in nose every day. Each Nostril 1 Bottle 3   • albuterol (VENTOLIN OR PROVENTIL) 108 (90 BASE) MCG/ACT AERS inhalation aerosol Inhale 2 Puffs by mouth every 6 hours as needed for Shortness of Breath. 8.5 g 3   • Multiple Vitamins-Minerals (MULTIVITAMIN PO) Take 1 Tab by mouth every day.       • Fexofenadine HCl (ALLEGRA PO) Take  by mouth as needed.     • Cholecalciferol (VITAMIN D) 2000 UNIT CAPS Take  by mouth every day.     • GLUCOSAMINE-CHONDROITIN-MSM PO Take 2 Tabs by mouth every day.     • aspirin 81 MG tablet Take 81 mg by mouth every day.       No current facility-administered medications for this visit.     He  has a past medical history of Anesthesia, Anxiety, Arthritis, Body aches (11/19/2014), Bronchitis (2009), Chickenpox, Diabetes, Dysuria (7/30/2014), Enlarged liver, GERD (gastroesophageal reflux disease) (12/21/2011), Heart burn, Hypertension, Indigestion, Influenza, Insomnia (12/21/2011), Nephrolithiasis (7/30/2014), Obesity, BRAIN (obstructive sleep apnea), Pain (12-14-12), Psychiatric problem, Sleep apnea, and Snoring.    ROS  No chest pain, no shortness of breath, no abdominal pain       Objective:     /64   Pulse 85   Temp 36.7 °C (98.1 °F)  "(Temporal)   Resp 20   Ht 1.88 m (6' 2\")   Wt (!) 143 kg (315 lb)   SpO2 96%  Body mass index is 40.44 kg/m².   Physical Exam:  Constitutional: Alert, no distress.  Skin: Warm, dry, good turgor, no rashes in visible areas.  Eye: Equal, round and reactive, conjunctiva clear, lids normal.  ENMT: Lips without lesions, good dentition, oropharynx clear.  Neck: Trachea midline, no masses, no thyromegaly. No cervical or supraclavicular lymphadenopathy  Respiratory: Unlabored respiratory effort, lungs clear to auscultation, no wheezes, no ronchi.  Cardiovascular: Normal S1, S2, no murmur, no edema.  Abdomen: Soft, non-tender, no masses, no hepatosplenomegaly.  Psych: Alert and oriented x3, normal affect and mood.        Assessment and Plan:   The following treatment plan was discussed    1. Iron deficiency    - ferrous sulfate 325 (65 Fe) MG EC tablet; Take 1 tablet by mouth every day.  Dispense: 90 tablet; Refill: 3  - CBC WITH DIFFERENTIAL; Future  - IRON/TOTAL IRON BIND; Future  - FERRITIN; Future    2. Degeneration of lumbar or lumbosacral intervertebral disc      3. Degeneration of cervical intervertebral disc      4. Diabetes mellitus type 2 in obese (HCC)      5. Vitamin D deficiency disease      6. Iron deficiency anemia, unspecified iron deficiency anemia type    - CBC WITH DIFFERENTIAL; Future  - IRON/TOTAL IRON BIND; Future  - FERRITIN; Future      Followup: Return in about 6 months (around 1/15/2022) for anemia, iron def, dm.         "

## 2021-07-16 ENCOUNTER — NON-PROVIDER VISIT (OUTPATIENT)
Dept: MEDICAL GROUP | Facility: PHYSICIAN GROUP | Age: 61
End: 2021-07-16
Payer: COMMERCIAL

## 2021-07-16 VITALS
DIASTOLIC BLOOD PRESSURE: 66 MMHG | WEIGHT: 315 LBS | SYSTOLIC BLOOD PRESSURE: 132 MMHG | BODY MASS INDEX: 40.44 KG/M2 | HEART RATE: 84 BPM

## 2021-07-16 DIAGNOSIS — E11.69 DIABETES MELLITUS TYPE 2 IN OBESE: ICD-10-CM

## 2021-07-16 DIAGNOSIS — E66.9 DIABETES MELLITUS TYPE 2 IN OBESE: ICD-10-CM

## 2021-07-16 PROCEDURE — 99999 PR NO CHARGE: CPT | Performed by: FAMILY MEDICINE

## 2021-07-16 RX ORDER — DULAGLUTIDE 3 MG/.5ML
3 INJECTION, SOLUTION SUBCUTANEOUS
Qty: 6 ML | Refills: 1 | Status: SHIPPED | OUTPATIENT
Start: 2021-07-16 | End: 2021-10-27

## 2021-07-16 RX ORDER — PIOGLITAZONEHYDROCHLORIDE 30 MG/1
30 TABLET ORAL DAILY
Qty: 90 TABLET | Refills: 1 | Status: SHIPPED | OUTPATIENT
Start: 2021-07-16 | End: 2021-12-02

## 2021-07-16 RX ORDER — EMPAGLIFLOZIN 25 MG/1
25 TABLET, FILM COATED ORAL DAILY
Qty: 90 TABLET | Refills: 1 | Status: SHIPPED | OUTPATIENT
Start: 2021-07-16

## 2021-07-16 ASSESSMENT — FIBROSIS 4 INDEX: FIB4 SCORE: 1.29

## 2021-07-16 NOTE — NON-PROVIDER
Patient Consult Note    TIME IN: n/a    Primary care physician: Ryann Street M.D.    Reason for consult: Management of Uncontrolled Type 2 Diabetes    HPI:  Jamey Riojas is a 60 y.o. old patient who comes in today for evaluation of above stated problem.    Most Recent labs and A1c:    Lab Results   Component Value Date/Time    HBA1C 7.5 (A) 05/07/2021 02:56 PM          Lab Results   Component Value Date/Time    CHOLSTRLTOT 159 10/02/2020 09:40 AM    LDL 68 10/02/2020 09:40 AM    HDL 37 (A) 10/02/2020 09:40 AM    TRIGLYCERIDE 268 (H) 10/02/2020 09:40 AM       Lab Results   Component Value Date/Time    SODIUM 134 (L) 04/23/2021 01:11 PM    POTASSIUM 4.2 04/23/2021 01:11 PM    CHLORIDE 97 04/23/2021 01:11 PM    CO2 26 04/23/2021 01:11 PM    GLUCOSE 215 (H) 04/23/2021 01:11 PM    BUN 15 04/23/2021 01:11 PM    CREATININE 0.81 04/23/2021 01:11 PM    CREATININE 0.8 09/10/2007 10:15 AM     Lab Results   Component Value Date/Time    ALKPHOSPHAT 130 (H) 04/23/2021 01:11 PM    ASTSGOT 18 04/23/2021 01:11 PM    ALTSGPT 15 04/23/2021 01:11 PM    TBILIRUBIN 0.8 04/23/2021 01:11 PM    ALBUMIN 4.1 04/23/2021 01:11 PM      No components found for: MICROALBCREATRATIO    Medications:    Current Outpatient Medications:   •  Fe Bisgly-Succ-C-Thre-B12-FA (IRON 21/7 PO), Take  by mouth.  •  Vitamin C, Take  by mouth.  •  ascorbic acid, 500 mg, Oral, DAILY  •  ferrous sulfate, 325 mg, Oral, DAILY  •  traZODone, TAKE 1 TO 2 TABLETS BY MOUTH ONCE DAILY AT BEDTIME AS NEEDED FOR SLEEP  •  tamsulosin, Take 2 capsules by mouth once daily  •  celecoxib, Take 1 capsule by mouth twice daily  •  finasteride, Take 1 tablet by mouth once daily  •  amLODIPine, Take 1 tablet by mouth once daily  •  metformin, TAKE 1 TABLET BY MOUTH TWICE DAILY WITH MEALS  •  Trulicity, 3 mg, Subcutaneous, Q7 DAYS  •  losartan, Take 1 tablet by mouth once daily  •  cefdinir,   •  doxycycline,   •  oxycodone,   •  sulfamethoxazole-trimethoprim,   •   metoprolol SR, Take 1 tablet by mouth once daily  •  sertraline, Take 1 tablet by mouth once daily  •  oxyCODONE-acetaminophen, TAKE 1 2 TO 1 (ONE HALF TO ONE) TABLET BY MOUTH EVERY 4 HOURS FOR 10 DAYS AS NEEDED FOR PAIN (MODERATE 4 6)  •  atorvastatin, 20 mg, Oral, DAILY  •  amitriptyline, TAKE 1 TABLET BY MOUTH AT BEDTIME  •  pioglitazone, 30 mg, Oral, DAILY  •  gabapentin, TAKE 3 CAPSULES BY MOUTH THREE TIMES DAILY  •  insulin NPH, 15 Units, Subcutaneous, BID (Patient taking differently: 30 Units, Subcutaneous, 2 TIMES DAILY)  •  Omega-3 Fatty Acids (FISH OIL PO), Take  by mouth every day.  •  FIBER PO, 2 capsule, Oral, DAILY  •  Misc. Devices, One touch ultra test strips. Patient has DM type 2 and tests twice per day  •  Misc. Devices, One touch ultra lancets; patient has diabetes mellitus type 2 and test once per day  •  tizanidine, 4 mg, Oral, Q6HRS PRN (Patient taking differently: 4 mg, Oral, DAILY)  •  omeprazole, TAKE ONE CAPSULE BY MOUTH ONCE DAILY  •  Misc. Devices, CPAP supplies; fax to [place] [phone].  •  fluticasone, 2 Spray, Nasal, DAILY  •  albuterol, 2 Puff, Inhalation, Q6HRS PRN  •  Multiple Vitamins-Minerals (MULTIVITAMIN PO), 1 tablet, Oral, DAILY  •  Fexofenadine HCl (ALLEGRA PO), Take  by mouth as needed.  •  Vitamin D, Take  by mouth every day.  •  GLUCOSAMINE-CHONDROITIN-MSM PO, 2 tablet, Oral, DAILY  •  aspirin, 81 mg, Oral, DAILY    Preventative Management  BP regimen (ACE/ARB) -losartan 100 mg once daily  ASA -81 mg once daily  Statin -Lipitor 20 mg once daily  Last Retinal Scan -up-to-date  Last Foot Exam -up-to-date    Diabetes Medication History and Current Regimen  Metformin: 1000 mg BID   GLP-1 Agent: Dulaglutide 1.5 mg once weekly   Thiazolidinedione: Pioglitazone 30 mg once daily   Basal Insulin: NPH 30 units QAM and QPM    Pt has home glucometer and proper testing technique - y    Pt reports blood sugars:   Before Breakfast: 120  Before Lunch:   Before Dinner:   Before Bedtime:  230  Other times:     Hypoglycemia awareness -yes  Nocturnal hypoglycemia-yes  Hypoglycemia:  None    Current Exercise - PT    Dietary - fast food.     Foot Exam:  Completed     updated caregaps  Immunization History   Administered Date(s) Administered   • Influenza Vaccine Pediatric Split - Historical Data 11/20/2006   • Influenza Vaccine Quad Inj (Pf) 10/24/2017   • Influenza, Unspecified - HISTORICAL DATA 11/20/2006   • Pneumococcal polysaccharide vaccine (PPSV-23) 12/30/2005   • Tdap Vaccine 11/08/2015       Past Medical History:  Patient Active Problem List    Diagnosis Date Noted   • Dural tear 04/06/2021   • H/O cervical spine surgery 01/15/2021   • Memory problem 10/09/2020   • History of colon polyps 11/13/2019   • Flank pain 09/12/2019   • Left lower quadrant pain 08/15/2019   • Dyslipidemia 07/26/2018   • Morbid obesity with BMI of 40.0-44.9, adult ([facility]) 05/10/2018   • H/O colonoscopy with polypectomy 08/17/2017   • Chronic use of opiate drug for therapeutic purpose 08/11/2016   • Lumbar stenosis with neurogenic claudication 12/31/2015   • Anemia of unknown etiology 05/21/2015   • Sinusitis 04/21/2015   • Body aches 11/19/2014   • Nephrolithiasis 07/30/2014   • BPH (benign prostatic hyperplasia) 12/20/2013   • Degeneration of lumbar or lumbosacral intervertebral disc 12/30/2012   • Major depression 12/02/2012   • Obstructive sleep apnea 12/02/2012   • Iron deficiency anemia 12/02/2012   • Degeneration of cervical intervertebral disc 11/30/2012   • Chronic pain of left knee 09/18/2012   • Vitamin D deficiency disease 01/12/2012   • Diabetes mellitus type 2 in obese ([facility]) 12/21/2011   • HTN (hypertension) 12/21/2011   • Insomnia 12/21/2011   • GERD (gastroesophageal reflux disease) 12/21/2011   • Chronic back pain 12/21/2011       Past Surgical History:  Past Surgical History:   Procedure Laterality Date   • COLONOSCOPY  11/13/2019    Procedure: COLONOSCOPY;  Surgeon: Buddy Diaz M.D.;  Location:  SURGERY [facility];  Service: Gastroenterology   • LUMBAR FUSION POSTERIOR  12/30/2012    Performed by Benjamin Kumar M.D. at SURGERY [facility]   • CERVICAL DISK AND FUSION ANTERIOR  11/30/2012    Performed by Benjamin Kumar M.D. at SURGERY [facility]   • HARDWARE REMOVAL ORTHO  11/30/2012    Performed by Benjamin Kumar M.D. at SURGERY [facility]   • KNEE ARTHROSCOPY  9/18/2012    Performed by James Francis M.D. at SURGERY [facility]   • MEDIAL MENISCECTOMY  9/18/2012    Performed by James Francis M.D. at SURGERY [facility]   • DEBRIDEMENT  9/18/2012    Performed by James Francis M.D. at SURGERY [facility]   • LUMBAR FUSION POSTERIOR  6/18/2010    Performed by BENJAMIN KUMAR at SURGERY [facility]   • LUMBAR DECOMPRESSION  6/18/2010    Performed by BENJAMIN KUMAR at SURGERY [facility]   • KNEE ARTHROSCOPY  10/20/2009    Performed by JAMES FRANCIS at SURGERY [facility]   • MEDIAL MENISCECTOMY  10/20/2009    Performed by JAMES FRANCIS at SURGERY [facility]   • CERVICAL FUSION POSTERIOR  9/2007    c -3-4-5    • CHOLECYSTECTOMY  1984    open   • FOOT SURGERY      left  Farrar's neuroma and heel spur       Allergies:  Penicillins, Phenergan [promethazine hcl], and Influenza virus vacc    Social History:  Social History     Socioeconomic History   • Marital status:      Spouse name: Not on file   • Number of children: Not on file   • Years of education: Not on file   • Highest education level: Not on file   Occupational History   • Not on file   Tobacco Use   • Smoking status: Never Smoker   • Smokeless tobacco: Never Used   Vaping Use   • Vaping Use: Never used   Substance and Sexual Activity   • Alcohol use: No     Alcohol/week: 0.0 oz   • Drug use: No   • Sexual activity: Yes     Partners: Female   Other Topics Concern   •  Service No   • Blood Transfusions Yes   • Caffeine Concern No   • Occupational Exposure No   •  Hobby Hazards No   • Sleep Concern Yes   • Stress Concern Yes   • Weight Concern Yes   • Special Diet No   • Back Care No   • Exercise No   • Bike Helmet No   • Seat Belt Yes   • Self-Exams Yes   Social History Narrative   • Not on file     Social Determinants of Health     Financial Resource Strain:    • Difficulty of Paying Living Expenses:    Food Insecurity:    • Worried About Running Out of Food in the Last Year:    • Ran Out of Food in the Last Year:    Transportation Needs:    • Lack of Transportation (Medical):    • Lack of Transportation (Non-Medical):    Physical Activity:    • Days of Exercise per Week:    • Minutes of Exercise per Session:    Stress:    • Feeling of Stress :    Social Connections:    • Frequency of Communication with Friends and Family:    • Frequency of Social Gatherings with Friends and Family:    • Attends Worship Services:    • Active Member of Clubs or Organizations:    • Attends Club or Organization Meetings:    • Marital Status:    Intimate Partner Violence:    • Fear of Current or Ex-Partner:    • Emotionally Abused:    • Physically Abused:    • Sexually Abused:        Family History:  Family History   Problem Relation Age of Onset   • Other Mother         osteopenia   • Hypertension Mother    • Psychiatric Illness Mother    • Other Father         brain tumor   • Heart Attack Maternal Grandfather    • Sleep Apnea Neg Hx        Physical Examination:  Vital signs: /66   Pulse 84   Wt (!) 143 kg (315 lb)   BMI 40.44 kg/m²  Body mass index is 40.44 kg/m².      Assessment and Plan:  -A1c has improved, but glucose still elevated.     1. DM2 medication changes in bold :  Metformin: 1000 mg twice daily   Increase GLP-1 Agent: Dulaglutide 3mg once weekly   Start Jardiance 25mg once daily   Thiazolidinedione: Pioglitazone 30 mg once daily   Basal Insulin: NPH 30 units QAM and QPM    -Blood glucose and A1c target    • However, more aggressive diabetic control is reasonable when  tolerated.  • Pt's treatment of Hypoglycemia. 15:15 Rule discussed with patient      2.  Cardiovascular medication changes in bold:  • pcp to manage       3.  Lifestyle changes   · Focus on eating a DASH/Mediterranean-style diet.   · Exercise 30 minutes daily at least 5 days/week, as tolerated.        Follow-up appointment in 8 weeks    Lamonte Stone, PharmD, MS, BCACP, [facility]  Phone [phone] fax [phone]    This note was created using voice recognition software (Dragon). The accuracy of the dictation is limited by the abilities of the software. I have reviewed the note prior to signing, however some errors in grammar and context are still possible. If you have any questions related to this note please do not hesitate to contact our office.     05/07/21    CC:   Ryann Street M.D.  No ref. provider found  Dr. Shawn Murphy    TIME OUT: n/a  Time with Pt

## 2021-08-09 DIAGNOSIS — E61.1 IRON DEFICIENCY: ICD-10-CM

## 2021-08-09 RX ORDER — LANOLIN ALCOHOL/MO/W.PET/CERES
325 CREAM (GRAM) TOPICAL DAILY
Qty: 90 TABLET | Refills: 3 | Status: SHIPPED | OUTPATIENT
Start: 2021-08-09

## 2021-08-09 RX ORDER — MULTIVITAMIN
1 TABLET ORAL DAILY
Qty: 30 TABLET | Refills: 3 | Status: SHIPPED | OUTPATIENT
Start: 2021-08-09

## 2021-08-09 RX ORDER — ASCORBIC ACID 500 MG
500 TABLET ORAL DAILY
Qty: 100 TABLET | Refills: 5 | Status: SHIPPED | OUTPATIENT
Start: 2021-08-09

## 2021-09-07 NOTE — TELEPHONE ENCOUNTER
Received request via: Pharmacy    Was the patient seen in the last year in this department? Yes    Does the patient have an active prescription (recently filled or refills available) for medication(s) requested? No    Requested Prescriptions     Pending Prescriptions Disp Refills    gabapentin (NEURONTIN) 300 MG Cap [Pharmacy Med Name: Gabapentin 300 MG Oral Capsule] 810 Capsule 3     Sig: TAKE 3 CAPSULES BY MOUTH THREE TIMES DAILY

## 2021-09-08 RX ORDER — GABAPENTIN 300 MG/1
CAPSULE ORAL
Qty: 810 CAPSULE | Refills: 3 | Status: SHIPPED | OUTPATIENT
Start: 2021-09-08

## 2021-10-14 DIAGNOSIS — F51.01 PRIMARY INSOMNIA: ICD-10-CM

## 2021-10-20 RX ORDER — ZOLPIDEM TARTRATE 10 MG/1
TABLET ORAL
Qty: 90 TABLET | Refills: 0 | Status: SHIPPED | OUTPATIENT
Start: 2021-10-20 | End: 2022-01-25

## 2021-10-25 ENCOUNTER — HOSPITAL ENCOUNTER (OUTPATIENT)
Dept: LAB | Facility: MEDICAL CENTER | Age: 61
End: 2021-10-25
Attending: INTERNAL MEDICINE
Payer: COMMERCIAL

## 2021-10-25 DIAGNOSIS — E11.69 DIABETES MELLITUS TYPE 2 IN OBESE: ICD-10-CM

## 2021-10-25 DIAGNOSIS — E66.9 DIABETES MELLITUS TYPE 2 IN OBESE: ICD-10-CM

## 2021-10-25 PROCEDURE — 80053 COMPREHEN METABOLIC PANEL: CPT

## 2021-10-25 PROCEDURE — 83036 HEMOGLOBIN GLYCOSYLATED A1C: CPT

## 2021-10-26 LAB
ALBUMIN SERPL BCP-MCNC: 4.3 G/DL (ref 3.2–4.9)
ALBUMIN/GLOB SERPL: 1.5 G/DL
ALP SERPL-CCNC: 104 U/L (ref 30–99)
ALT SERPL-CCNC: 15 U/L (ref 2–50)
ANION GAP SERPL CALC-SCNC: 12 MMOL/L (ref 7–16)
AST SERPL-CCNC: 13 U/L (ref 12–45)
BILIRUB SERPL-MCNC: 1.1 MG/DL (ref 0.1–1.5)
BUN SERPL-MCNC: 10 MG/DL (ref 8–22)
CALCIUM SERPL-MCNC: 9.5 MG/DL (ref 8.5–10.5)
CHLORIDE SERPL-SCNC: 103 MMOL/L (ref 96–112)
CO2 SERPL-SCNC: 26 MMOL/L (ref 20–33)
CREAT SERPL-MCNC: 0.93 MG/DL (ref 0.5–1.4)
EST. AVERAGE GLUCOSE BLD GHB EST-MCNC: 180 MG/DL
GLOBULIN SER CALC-MCNC: 2.8 G/DL (ref 1.9–3.5)
GLUCOSE SERPL-MCNC: 203 MG/DL (ref 65–99)
HBA1C MFR BLD: 7.9 % (ref 4–5.6)
POTASSIUM SERPL-SCNC: 3.8 MMOL/L (ref 3.6–5.5)
PROT SERPL-MCNC: 7.1 G/DL (ref 6–8.2)
SODIUM SERPL-SCNC: 141 MMOL/L (ref 135–145)

## 2021-10-27 ENCOUNTER — NON-PROVIDER VISIT (OUTPATIENT)
Dept: MEDICAL GROUP | Facility: PHYSICIAN GROUP | Age: 61
End: 2021-10-27
Payer: COMMERCIAL

## 2021-10-27 DIAGNOSIS — E66.9 DIABETES MELLITUS TYPE 2 IN OBESE: ICD-10-CM

## 2021-10-27 DIAGNOSIS — E11.69 DIABETES MELLITUS TYPE 2 IN OBESE: ICD-10-CM

## 2021-10-27 PROCEDURE — 99402 PREV MED CNSL INDIV APPRX 30: CPT | Performed by: FAMILY MEDICINE

## 2021-10-27 RX ORDER — DULAGLUTIDE 4.5 MG/.5ML
0.5 INJECTION, SOLUTION SUBCUTANEOUS
Qty: 2 ML | Refills: 3 | Status: SHIPPED | OUTPATIENT
Start: 2021-10-27

## 2021-10-27 NOTE — NON-PROVIDER
"Patient Consult Note    TIME IN: 1:43 pm  TIME OUT: 2:15 pm    Primary care physician: Ryann Street M.D.    Reason for consult: Management of Uncontrolled Type 2 Diabetes    HPI:  Jamey Riojas is a 61 y.o. old patient who comes in today for evaluation of above stated problem.    Most Recent HbA1c:   Lab Results   Component Value Date/Time    HBA1C 7.9 (H) 10/25/2021 02:14 PM      Lab Results   Component Value Date/Time    CREATININE 0.93 10/25/2021 02:14 PM    CREATININE 0.8 09/10/2007 10:15 AM        Diabetes Medication History and Current Regimen  Metformin: 1000 mg BID   GLP-1 Agent: Trulicity 3 mg once weekly  SGLT-2 Inhibitor:  Empagliflozin 25 mg once daily   Thiazolidinedione: Pioglitazone 30 mg once daily    Basal Insulin: NPH 30 units QAM and QPM - Pt has not been using his insulin regularly. Currently using 15 units once daily intermittently based on his SMBG    Pt has home glucometer and proper testing technique - Yes    Pt reports blood sugars:   Before Breakfast: Typically 125-150 based on what he ate the night prior  Before Lunch: low 200's    Hypoglycemia awareness - Yes  Nocturnal hypoglycemia- Denies  Hypoglycemia:  None - states sometimes he feels \"low\" but does not test    Pt's treatment of Hypoglycemia - Counseled  - 15:15 Rule    Current Exercise - Limited d/t back pain and use of walker, but pt attempting to walk more.  Exercise Goal - Increase as tolerated    Dietary: Pt states he has an addiction to crunchy/salty foods. States quick and easy meals  Breakfast - Slim fast, coffee  Lunch - Typically nothing, occasional apple or orange  Dinner - Potatoes, rice, broccoli cheddar rice, pot pies, casseroles, lasagna  Snack - Chips (snack size bag), Atkins candy bar    Foot Exam:  Monofilament exam - Overdue    Preventative Management  BP regimen (ACE/ARB) - Losartan 100 mg once daily  ASA - N/a  Statin - Atorvastatin 20 mg once daily  Last Retinal Scan - Due 12/2021  Last Foot Exam - Pt " checks regularly  Last A1c -   Lab Results   Component Value Date/Time    HBA1C 7.9 (H) 10/25/2021 02:14 PM      Last Microalbuminuria -    Ref. Range 4/2/2021 15:26   Micro Alb Creat Ratio Latest Ref Range: 0 - 30 mg/g 117 (H)   Creatinine, Urine Latest Units: mg/dL 234.62   Microalbumin, Urine Random Latest Units: mg/dL 27.5       Past Medical History:  Patient Active Problem List    Diagnosis Date Noted   • Dural tear 04/06/2021   • H/O cervical spine surgery 01/15/2021   • Memory problem 10/09/2020   • History of colon polyps 11/13/2019   • Flank pain 09/12/2019   • Left lower quadrant pain 08/15/2019   • Dyslipidemia 07/26/2018   • Morbid obesity with BMI of 40.0-44.9, adult (Formerly [facility]) 05/10/2018   • H/O colonoscopy with polypectomy 08/17/2017   • Chronic use of opiate drug for therapeutic purpose 08/11/2016   • Lumbar stenosis with neurogenic claudication 12/31/2015   • Anemia of unknown etiology 05/21/2015   • Sinusitis 04/21/2015   • Body aches 11/19/2014   • Nephrolithiasis 07/30/2014   • BPH (benign prostatic hyperplasia) 12/20/2013   • Degeneration of lumbar or lumbosacral intervertebral disc 12/30/2012   • Major depression 12/02/2012   • Obstructive sleep apnea 12/02/2012   • Iron deficiency anemia 12/02/2012   • Degeneration of cervical intervertebral disc 11/30/2012   • Chronic pain of left knee 09/18/2012   • Vitamin D deficiency disease 01/12/2012   • Diabetes mellitus type 2 in obese (Formerly [facility]) 12/21/2011   • HTN (hypertension) 12/21/2011   • Insomnia 12/21/2011   • GERD (gastroesophageal reflux disease) 12/21/2011   • Chronic back pain 12/21/2011       Past Surgical History:  Past Surgical History:   Procedure Laterality Date   • COLONOSCOPY  11/13/2019    Procedure: COLONOSCOPY;  Surgeon: Buddy Diaz M.D.;  Location: SURGERY [facility];  Service: Gastroenterology   • LUMBAR FUSION POSTERIOR  12/30/2012    Performed by Mohsen Chan M.D. at [facility]   • CERVICAL DISK AND  FUSION ANTERIOR  11/30/2012    Performed by Benjamin Kumar M.D. at SURGERY [facility]   • HARDWARE REMOVAL ORTHO  11/30/2012    Performed by Benjamin Kumar M.D. at SURGERY [facility]   • KNEE ARTHROSCOPY  9/18/2012    Performed by James Francis M.D. at SURGERY [facility]   • MEDIAL MENISCECTOMY  9/18/2012    Performed by James Francis M.D. at SURGERY [facility]   • DEBRIDEMENT  9/18/2012    Performed by James Francis M.D. at SURGERY [facility]   • LUMBAR FUSION POSTERIOR  6/18/2010    Performed by BENJAMIN KUMAR at SURGERY [facility]   • LUMBAR DECOMPRESSION  6/18/2010    Performed by BENJAMIN KUMAR at SURGERY [facility]   • KNEE ARTHROSCOPY  10/20/2009    Performed by JAMES FRANCIS at SURGERY [facility]   • MEDIAL MENISCECTOMY  10/20/2009    Performed by JAMES FRANCIS at SURGERY [facility]   • CERVICAL FUSION POSTERIOR  9/2007    c -3-4-5    • CHOLECYSTECTOMY  1984    open   • FOOT SURGERY      left  Farrar's neuroma and heel spur       Allergies:  Penicillins, Phenergan [promethazine hcl], and Influenza virus vacc    Social History:  Social History     Socioeconomic History   • Marital status:      Spouse name: Not on file   • Number of children: Not on file   • Years of education: Not on file   • Highest education level: Not on file   Occupational History   • Not on file   Tobacco Use   • Smoking status: Never Smoker   • Smokeless tobacco: Never Used   Vaping Use   • Vaping Use: Never used   Substance and Sexual Activity   • Alcohol use: No     Alcohol/week: 0.0 oz   • Drug use: No   • Sexual activity: Yes     Partners: Female   Other Topics Concern   •  Service No   • Blood Transfusions Yes   • Caffeine Concern No   • Occupational Exposure No   • Hobby Hazards No   • Sleep Concern Yes   • Stress Concern Yes   • Weight Concern Yes   • Special Diet No   • Back Care No   • Exercise No   • Bike Helmet No   • Seat Belt Yes   •  Self-Exams Yes   Social History Narrative   • Not on file     Social Determinants of Health     Financial Resource Strain:    • Difficulty of Paying Living Expenses:    Food Insecurity:    • Worried About Running Out of Food in the Last Year:    • Ran Out of Food in the Last Year:    Transportation Needs:    • Lack of Transportation (Medical):    • Lack of Transportation (Non-Medical):    Physical Activity:    • Days of Exercise per Week:    • Minutes of Exercise per Session:    Stress:    • Feeling of Stress :    Social Connections:    • Frequency of Communication with Friends and Family:    • Frequency of Social Gatherings with Friends and Family:    • Attends Sabianism Services:    • Active Member of Clubs or Organizations:    • Attends Club or Organization Meetings:    • Marital Status:    Intimate Partner Violence:    • Fear of Current or Ex-Partner:    • Emotionally Abused:    • Physically Abused:    • Sexually Abused:        Family History:  Family History   Problem Relation Age of Onset   • Other Mother         osteopenia   • Hypertension Mother    • Psychiatric Illness Mother    • Other Father         brain tumor   • Heart Attack Maternal Grandfather    • Sleep Apnea Neg Hx        Medications:    Current Outpatient Medications:   •  zolpidem (AMBIEN) 10 MG Tab, TAKE 1 TABLET BY MOUTH ONCE DAILY AT BEDTIME AS NEEDED FOR SLEEP, Disp: 90 Tablet, Rfl: 0  •  gabapentin (NEURONTIN) 300 MG Cap, TAKE 3 CAPSULES BY MOUTH THREE TIMES DAILY, Disp: 810 Capsule, Rfl: 3  •  Multiple Vitamin (MULTIVITAMIN) Tab, Take 1 tablet by mouth every day., Disp: 30 tablet, Rfl: 03  •  ferrous sulfate 325 (65 Fe) MG EC tablet, Take 1 tablet by mouth every day., Disp: 90 tablet, Rfl: 3  •  ascorbic acid (VITAMIN C) 500 MG tablet, Take 1 tablet by mouth every day., Disp: 100 tablet, Rfl: 5  •  Empagliflozin (JARDIANCE) 25 MG Tab, Take 25 mg by mouth every day at 6 PM., Disp: 90 tablet, Rfl: 1  •  metformin (GLUCOPHAGE) 1000 MG tablet,  Take 1 tablet by mouth 2 times a day with meals., Disp: 180 tablet, Rfl: 1  •  pioglitazone (ACTOS) 30 MG Tab, Take 1 tablet by mouth every day., Disp: 90 tablet, Rfl: 1  •  insulin NPH (NOVOLIN N RELION) 100 UNIT/ML Suspension, Inject 30-40 Units under the skin 2 times a day., Disp: 80 mL, Rfl: 1  •  Fe Bisgly-Succ-C-Thre-B12-FA (IRON 21/7 PO), Take  by mouth., Disp: , Rfl:   •  Ascorbic Acid (VITAMIN C) 1000 MG Tab, Take  by mouth., Disp: , Rfl:   •  traZODone (DESYREL) 100 MG Tab, TAKE 1 TO 2 TABLETS BY MOUTH ONCE DAILY AT BEDTIME AS NEEDED FOR SLEEP, Disp: 180 tablet, Rfl: 3  •  tamsulosin (FLOMAX) 0.4 MG capsule, Take 2 capsules by mouth once daily, Disp: 180 capsule, Rfl: 3  •  celecoxib (CELEBREX) 200 MG Cap, Take 1 capsule by mouth twice daily, Disp: 180 capsule, Rfl: 3  •  finasteride (PROSCAR) 5 MG Tab, Take 1 tablet by mouth once daily, Disp: 90 tablet, Rfl: 3  •  amLODIPine (NORVASC) 10 MG Tab, Take 1 tablet by mouth once daily, Disp: 90 tablet, Rfl: 3  •  losartan (COZAAR) 100 MG Tab, Take 1 tablet by mouth once daily, Disp: 90 tablet, Rfl: 3  •  cefdinir (OMNICEF) 300 MG Cap, , Disp: , Rfl:   •  doxycycline (VIBRAMYCIN) 100 MG Cap, , Disp: , Rfl:   •  oxycodone (OXY-IR) 15 MG immediate release tablet, , Disp: , Rfl:   •  sulfamethoxazole-trimethoprim (BACTRIM DS) 800-160 MG tablet, , Disp: , Rfl:   •  metoprolol SR (TOPROL XL) 25 MG TABLET SR 24 HR, Take 1 tablet by mouth once daily, Disp: 90 tablet, Rfl: 3  •  sertraline (ZOLOFT) 50 MG Tab, Take 1 tablet by mouth once daily, Disp: 90 tablet, Rfl: 3  •  oxyCODONE-acetaminophen (PERCOCET-10)  MG Tab, TAKE 1 2 TO 1 (ONE HALF TO ONE) TABLET BY MOUTH EVERY 4 HOURS FOR 10 DAYS AS NEEDED FOR PAIN (MODERATE 4 6), Disp: , Rfl:   •  atorvastatin (LIPITOR) 20 MG Tab, Take 1 Tab by mouth every day. To lower you risk of heart attacks and strokes., Disp: 90 Tab, Rfl: 3  •  amitriptyline (ELAVIL) 50 MG Tab, TAKE 1 TABLET BY MOUTH AT BEDTIME, Disp: 90 Tab, Rfl:  3  •  pioglitazone (ACTOS) 30 MG Tab, Take 1 Tab by mouth every day., Disp: 90 Tab, Rfl: 3  •  Omega-3 Fatty Acids (FISH OIL PO), Take  by mouth every day., Disp: , Rfl:   •  FIBER PO, Take 2 Caps by mouth every day. Gummy, Disp: , Rfl:   •  Misc. Devices Misc, One touch ultra test strips. Patient has DM type 2 and tests twice per day, Disp: 100 Each, Rfl: 3  •  Misc. Devices Misc, One touch ultra lancets; patient has diabetes mellitus type 2 and test once per day, Disp: 100 Device, Rfl: 3  •  tizanidine (ZANAFLEX) 4 MG Tab, Take 1 Tab by mouth every 6 hours as needed (back pain). (Patient taking differently: Take 4 mg by mouth every day.), Disp: 270 Tab, Rfl: 3  •  omeprazole (PRILOSEC) 20 MG delayed-release capsule, TAKE ONE CAPSULE BY MOUTH ONCE DAILY, Disp: 90 Cap, Rfl: 3  •  Misc. Devices Misc, CPAP supplies; fax to [employer] [phone]., Disp: 100 Device, Rfl: 3  •  fluticasone (FLONASE) 50 MCG/ACT nasal spray, Spray 2 Sprays in nose every day. Each Nostril, Disp: 1 Bottle, Rfl: 3  •  albuterol (VENTOLIN OR PROVENTIL) 108 (90 BASE) MCG/ACT AERS inhalation aerosol, Inhale 2 Puffs by mouth every 6 hours as needed for Shortness of Breath., Disp: 8.5 g, Rfl: 3  •  Fexofenadine HCl (ALLEGRA PO), Take  by mouth as needed., Disp: , Rfl:   •  Cholecalciferol (VITAMIN D) 2000 UNIT CAPS, Take  by mouth every day., Disp: , Rfl:   •  GLUCOSAMINE-CHONDROITIN-MSM PO, Take 2 Tabs by mouth every day., Disp: , Rfl:   •  aspirin 81 MG tablet, Take 81 mg by mouth every day., Disp: , Rfl:     Labs: Reviewed    Physical Examination:  Vital signs: There were no vitals taken for this visit. There is no height or weight on file to calculate BMI.    Assessment and Plan:    1. DM2  · Since pt's last visit, he was able increase his Trulicity and start Jardiance 25 mg once daily. He has no c/o SE at this time and is tolerating that medications well.  · Pt's A1c did increase from 7.5% (5/2021) to 7.9% (10/2021).  · Pt did decrease his NPH to  15 units once daily since his last appt. W/ this decrease, pt reported SMBG remain elevated but similar to his last visit.  · Pt states he has noted some appetite suppression w/ Trulicity increase, counseled pt to eat smaller portions more often. Given his BG remain elevated, he is tolerating this medication well, and he would benefit from weightloss - will further titrate to goal dose.    - Medication changes -   · Increase Trulicity up to 4.5 mg once weekly (to start after using his #3 pens of Trulicity 3 mg.)  · Start using insulin NPH 7 units BID   · Counseled pt to titrate by 2-3 units every few days to maintain FBG  and 2hPP < 180  · Continue metformin 1000 mg BID   · Continue Jardiance 25 mg once daily  · Continue Pioglitazone 30 mg once daily    - Lifestyle changes -   · Diet: Continue to maximize lean proteins and veggies - less pot pie/carb heavy pre-prepared meals. Continue to cut out/down on carbs. Avoid simple sugars.   · Exercise: Increase as tolerated    FU: 6 weeks     Andrew SoloD  10/27/21    CC:   Ryann Street M.D.

## 2021-12-02 ENCOUNTER — OFFICE VISIT (OUTPATIENT)
Dept: MEDICAL GROUP | Facility: PHYSICIAN GROUP | Age: 61
End: 2021-12-02
Payer: COMMERCIAL

## 2021-12-02 VITALS
BODY MASS INDEX: 40.43 KG/M2 | HEIGHT: 74 IN | RESPIRATION RATE: 12 BRPM | WEIGHT: 315 LBS | HEART RATE: 79 BPM | OXYGEN SATURATION: 96 % | TEMPERATURE: 98.7 F | DIASTOLIC BLOOD PRESSURE: 72 MMHG | SYSTOLIC BLOOD PRESSURE: 120 MMHG

## 2021-12-02 DIAGNOSIS — E11.69 DIABETES MELLITUS TYPE 2 IN OBESE: ICD-10-CM

## 2021-12-02 DIAGNOSIS — E66.9 DIABETES MELLITUS TYPE 2 IN OBESE: ICD-10-CM

## 2021-12-02 DIAGNOSIS — E66.01 MORBID OBESITY WITH BMI OF 40.0-44.9, ADULT (HCC): ICD-10-CM

## 2021-12-02 PROCEDURE — 99214 OFFICE O/P EST MOD 30 MIN: CPT | Performed by: FAMILY MEDICINE

## 2021-12-02 RX ORDER — OXYCODONE AND ACETAMINOPHEN 7.5; 325 MG/1; MG/1
TABLET ORAL
COMMUNITY
Start: 2021-10-30

## 2021-12-02 ASSESSMENT — FIBROSIS 4 INDEX: FIB4 SCORE: 0.93

## 2021-12-02 NOTE — PROGRESS NOTES
Subjective:   Jamey Riojas is a 61 y.o. male here today for evaluation and management of:     Diabetes mellitus type 2 in obese  A1c:   Lab Results   Component Value Date/Time    HBA1C 7.9 (H) 10/25/2021 1414    AVGLUC 180 10/25/2021 1414     Lipids:   Lab Results   Component Value Date/Time    CHOLSTRLTOT 159 10/02/2020 09:40 AM    TRIGLYCERIDE 268 (H) 10/02/2020 09:40 AM    HDL 37 (A) 10/02/2020 09:40 AM    LDL 68 10/02/2020 09:40 AM   ]  BMP:   Lab Results   Component Value Date/Time    SODIUM 141 10/25/2021 1414    POTASSIUM 3.8 10/25/2021 1414    CHLORIDE 103 10/25/2021 1414    CO2 26 10/25/2021 1414    GLUCOSE 203 (H) 10/25/2021 1414    BUN 10 10/25/2021 1414    CREATININE 0.93 10/25/2021 1414    CALCIUM 9.5 10/25/2021 1414    ANION 12.0 10/25/2021 1414     GFR:   Lab Results   Component Value Date/Time    IFAFR[place] >60 10/25/2021 1414    IFNOTAFR >60 10/25/2021 1414     Last eye exam:   Foot exam: decreased sensation to monofilament testing b/l, he has some numbness, checks feet daily.   Medications: met 100bid, actos, jardiance 25, trulicity 4.5 weekly  Uses some novolin occasionally  Has no low blood sugars below 100  Losartan 100mg         Current medicines (including changes today)  Current Outpatient Medications   Medication Sig Dispense Refill   • oxyCODONE-acetaminophen (PERCOCET) 7.5-325 MG per tablet      • Dulaglutide (TRULICITY) 4.5 MG/0.5ML Solution Pen-injector Inject 0.5 mL under the skin every 7 days. 2 mL 3   • zolpidem (AMBIEN) 10 MG Tab TAKE 1 TABLET BY MOUTH ONCE DAILY AT BEDTIME AS NEEDED FOR SLEEP 90 Tablet 0   • gabapentin (NEURONTIN) 300 MG Cap TAKE 3 CAPSULES BY MOUTH THREE TIMES DAILY 810 Capsule 3   • Multiple Vitamin (MULTIVITAMIN) Tab Take 1 tablet by mouth every day. 30 tablet 03   • ferrous sulfate 325 (65 Fe) MG EC tablet Take 1 tablet by mouth every day. 90 tablet 3   • ascorbic acid (VITAMIN C) 500 MG tablet Take 1 tablet by mouth every day. 100 tablet 5   •  Empagliflozin (JARDIANCE) 25 MG Tab Take 25 mg by mouth every day at 6 PM. 90 tablet 1   • metformin (GLUCOPHAGE) 1000 MG tablet Take 1 tablet by mouth 2 times a day with meals. 180 tablet 1   • insulin NPH (NOVOLIN N RELION) 100 UNIT/ML Suspension Inject 30-40 Units under the skin 2 times a day. 80 mL 1   • Fe Bisgly-Succ-C-Thre-B12-FA (IRON 21/7 PO) Take  by mouth.     • Ascorbic Acid (VITAMIN C) 1000 MG Tab Take  by mouth.     • traZODone (DESYREL) 100 MG Tab TAKE 1 TO 2 TABLETS BY MOUTH ONCE DAILY AT BEDTIME AS NEEDED FOR SLEEP 180 tablet 3   • tamsulosin (FLOMAX) 0.4 MG capsule Take 2 capsules by mouth once daily 180 capsule 3   • celecoxib (CELEBREX) 200 MG Cap Take 1 capsule by mouth twice daily 180 capsule 3   • finasteride (PROSCAR) 5 MG Tab Take 1 tablet by mouth once daily 90 tablet 3   • amLODIPine (NORVASC) 10 MG Tab Take 1 tablet by mouth once daily 90 tablet 3   • losartan (COZAAR) 100 MG Tab Take 1 tablet by mouth once daily 90 tablet 3   • cefdinir (OMNICEF) 300 MG Cap      • doxycycline (VIBRAMYCIN) 100 MG Cap      • oxycodone (OXY-IR) 15 MG immediate release tablet      • sulfamethoxazole-trimethoprim (BACTRIM DS) 800-160 MG tablet      • metoprolol SR (TOPROL XL) 25 MG TABLET SR 24 HR Take 1 tablet by mouth once daily 90 tablet 3   • sertraline (ZOLOFT) 50 MG Tab Take 1 tablet by mouth once daily 90 tablet 3   • oxyCODONE-acetaminophen (PERCOCET-10)  MG Tab TAKE 1 2 TO 1 (ONE HALF TO ONE) TABLET BY MOUTH EVERY 4 HOURS FOR 10 DAYS AS NEEDED FOR PAIN (MODERATE 4 6)     • atorvastatin (LIPITOR) 20 MG Tab Take 1 Tab by mouth every day. To lower you risk of heart attacks and strokes. 90 Tab 3   • amitriptyline (ELAVIL) 50 MG Tab TAKE 1 TABLET BY MOUTH AT BEDTIME 90 Tab 3   • pioglitazone (ACTOS) 30 MG Tab Take 1 Tab by mouth every day. 90 Tab 3   • Omega-3 Fatty Acids (FISH OIL PO) Take  by mouth every day.     • FIBER PO Take 2 Caps by mouth every day. Gummy     • Misc. Devices Misc One touch  "ultra test strips. Patient has DM type 2 and tests twice per day 100 Each 3   • Misc. Devices Misc One touch ultra lancets; patient has diabetes mellitus type 2 and test once per day 100 Device 3   • tizanidine (ZANAFLEX) 4 MG Tab Take 1 Tab by mouth every 6 hours as needed (back pain). (Patient taking differently: Take 4 mg by mouth every day.) 270 Tab 3   • omeprazole (PRILOSEC) 20 MG delayed-release capsule TAKE ONE CAPSULE BY MOUTH ONCE DAILY 90 Cap 3   • Misc. Devices Misc CPAP supplies; fax to [employer] [phone]. 100 Device 3   • fluticasone (FLONASE) 50 MCG/ACT nasal spray Spray 2 Sprays in nose every day. Each Nostril 1 Bottle 3   • albuterol (VENTOLIN OR PROVENTIL) 108 (90 BASE) MCG/ACT AERS inhalation aerosol Inhale 2 Puffs by mouth every 6 hours as needed for Shortness of Breath. 8.5 g 3   • Fexofenadine HCl (ALLEGRA PO) Take  by mouth as needed.     • Cholecalciferol (VITAMIN D) 2000 UNIT CAPS Take  by mouth every day.     • GLUCOSAMINE-CHONDROITIN-MSM PO Take 2 Tabs by mouth every day.     • aspirin 81 MG tablet Take 81 mg by mouth every day.       No current facility-administered medications for this visit.     He  has a past medical history of Anesthesia, Anxiety, Arthritis, Body aches (11/19/2014), Bronchitis (2009), Chickenpox, Diabetes, Dysuria (7/30/2014), Enlarged liver, GERD (gastroesophageal reflux disease) (12/21/2011), Heart burn, Hypertension, Indigestion, Influenza, Insomnia (12/21/2011), Nephrolithiasis (7/30/2014), Obesity, BRAIN (obstructive sleep apnea), Pain (12-14-12), Psychiatric problem, Sleep apnea, and Snoring.    ROS  No chest pain, no shortness of breath, no abdominal pain       Objective:     /72   Pulse 79   Temp 37.1 °C (98.7 °F) (Temporal)   Resp 12   Ht 1.88 m (6' 2\")   Wt (!) 147 kg (324 lb)   SpO2 96%  Body mass index is 41.6 kg/m².   Physical Exam:  Constitutional: Alert, no distress.  Skin: Warm, dry, good turgor, no rashes in visible areas.  Eye: Equal, round " and reactive, conjunctiva clear, lids normal.  ENMT: Lips without lesions, good dentition, oropharynx clear.  Neck: Trachea midline, no masses, no thyromegaly. No cervical or supraclavicular lymphadenopathy  Respiratory: Unlabored respiratory effort, lungs clear to auscultation, no wheezes, no ronchi.  Cardiovascular: Normal S1, S2, no murmur, no edema.  Abdomen: Soft, non-tender, no masses, no hepatosplenomegaly.  Psych: Alert and oriented x3, normal affect and mood.        Assessment and Plan:   The following treatment plan was discussed    1. Diabetes mellitus type 2 in obese (HCC)    - Lipid Profile; Future  - Diabetic Monofilament LE Exam  - HEMOGLOBIN A1C; Future  - Comp Metabolic Panel; Future    2. Morbid obesity with BMI of 40.0-44.9, adult (HCC)    - Patient identified as having weight management issue.  Appropriate orders and counseling given.      Followup: Return in about 3 months (around 3/2/2022) for DM, weight, review labs.

## 2021-12-02 NOTE — ASSESSMENT & PLAN NOTE
A1c:   Lab Results   Component Value Date/Time    HBA1C 7.9 (H) 10/25/2021 1414    AVGLUC 180 10/25/2021 1414     Lipids:   Lab Results   Component Value Date/Time    CHOLSTRLTOT 159 10/02/2020 09:40 AM    TRIGLYCERIDE 268 (H) 10/02/2020 09:40 AM    HDL 37 (A) 10/02/2020 09:40 AM    LDL 68 10/02/2020 09:40 AM   ]  BMP:   Lab Results   Component Value Date/Time    SODIUM 141 10/25/2021 1414    POTASSIUM 3.8 10/25/2021 1414    CHLORIDE 103 10/25/2021 1414    CO2 26 10/25/2021 1414    GLUCOSE 203 (H) 10/25/2021 1414    BUN 10 10/25/2021 1414    CREATININE 0.93 10/25/2021 1414    CALCIUM 9.5 10/25/2021 1414    ANION 12.0 10/25/2021 1414     GFR:   Lab Results   Component Value Date/Time    IFA[place] >60 10/25/2021 1414    IFNOTAFR >60 10/25/2021 1414     Last eye exam:   Foot exam: decreased sensation to monofilament testing b/l, he has some numbness, checks feet daily.   Medications: met 100bid, actos, jardiance 25, trulicity 4.5 weekly  Uses some novolin occasionally  Has no low blood sugars below 100  Losartan 100mg

## 2021-12-05 DIAGNOSIS — E66.9 DIABETES MELLITUS TYPE 2 IN OBESE: ICD-10-CM

## 2021-12-05 DIAGNOSIS — E11.69 DIABETES MELLITUS TYPE 2 IN OBESE: ICD-10-CM

## 2021-12-07 RX ORDER — PIOGLITAZONEHYDROCHLORIDE 30 MG/1
TABLET ORAL
Qty: 90 TABLET | Refills: 3 | Status: SHIPPED | OUTPATIENT
Start: 2021-12-07 | End: 2022-03-14 | Stop reason: SDUPTHER

## 2021-12-07 RX ORDER — AMITRIPTYLINE HYDROCHLORIDE 50 MG/1
TABLET, FILM COATED ORAL
Qty: 90 TABLET | Refills: 3 | Status: SHIPPED | OUTPATIENT
Start: 2021-12-07 | End: 2022-11-30

## 2021-12-07 RX ORDER — ATORVASTATIN CALCIUM 20 MG/1
TABLET, FILM COATED ORAL
Qty: 90 TABLET | Refills: 3 | Status: SHIPPED | OUTPATIENT
Start: 2021-12-07 | End: 2022-03-07 | Stop reason: SDUPTHER

## 2021-12-07 NOTE — TELEPHONE ENCOUNTER
Received request via: Pharmacy    Was the patient seen in the last year in this department? Yes    Does the patient have an active prescription (recently filled or refills available) for medication(s) requested? No     12/02/2021  Last OV

## 2021-12-10 ENCOUNTER — NON-PROVIDER VISIT (OUTPATIENT)
Dept: MEDICAL GROUP | Facility: PHYSICIAN GROUP | Age: 61
End: 2021-12-10
Payer: COMMERCIAL

## 2021-12-10 DIAGNOSIS — E11.69 DIABETES MELLITUS TYPE 2 IN OBESE: ICD-10-CM

## 2021-12-10 DIAGNOSIS — E66.9 DIABETES MELLITUS TYPE 2 IN OBESE: ICD-10-CM

## 2021-12-10 PROCEDURE — 99401 PREV MED CNSL INDIV APPRX 15: CPT | Performed by: FAMILY MEDICINE

## 2021-12-10 NOTE — NON-PROVIDER
"Patient Consult Note    TIME IN: 220pm  TIME OUT: 2:47 pm    Primary care physician: Ryann Street M.D.    Reason for consult: Management of Uncontrolled Type 2 Diabetes    HPI:  Jamey Riojas is a 61 y.o. old patient who comes in today for evaluation of above stated problem.    Most Recent HbA1c:   Lab Results   Component Value Date/Time    HBA1C 7.9 (H) 10/25/2021 02:14 PM      Lab Results   Component Value Date/Time    CREATININE 0.93 10/25/2021 02:14 PM    CREATININE 0.8 09/10/2007 10:15 AM        Diabetes Medication History and Current Regimen  · Trulicity up to 4.5 mg once weekly (to start after using his #3 pens of Trulicity 3 mg.)  · Start using insulin NPH 7 units BID   · Counseled pt to titrate by 2-3 units every few days to maintain FBG  and 2hPP < 180  · Continue metformin 1000 mg BID   · Continue Jardiance 25 mg once daily  · Continue Pioglitazone 30 mg once daily    Basal Insulin: NPH 30 units QAM and QPM - Pt has not been using his insulin regularly. Currently using 15 units once daily intermittently based on his SMBG    Pt has home glucometer and proper testing technique - Yes    Pt reports blood sugars:   Before Breakfast: Typically 120-130 based on what he ate the night prior    Hypoglycemia awareness - Yes  Nocturnal hypoglycemia- Denies  Hypoglycemia:  None - states sometimes he feels \"low\" but does not test    Pt's treatment of Hypoglycemia - Counseled  - 15:15 Rule    Current Exercise - Limited d/t back pain and use of walker, but pt attempting to walk more.  Exercise Goal - Increase as tolerated    Dietary: Pt states he has an addiction to crunchy/salty foods. States quick and easy meals  Breakfast - Slim fast, coffee  Lunch - Typically nothing, occasional apple or orange  Dinner - Potatoes, rice, broccoli cheddar rice, pot pies, casseroles, lasagna  Snack - Chips (snack size bag), Atkins candy bar    Foot Exam:  Monofilament exam - Overdue    Preventative Management  BP " regimen (ACE/ARB) - Losartan 100 mg once daily  ASA - N/a  Statin - Atorvastatin 20 mg once daily  Last Retinal Scan - Due 1/21  Last Foot Exam - Pt checks regularly, some pain, but clean and dry.   Last A1c -   Lab Results   Component Value Date/Time    HBA1C 7.9 (H) 10/25/2021 02:14 PM      Lab Results   Component Value Date/Time    CHOLSTRLTOT 159 10/02/2020 09:40 AM    LDL 68 10/02/2020 09:40 AM    HDL 37 (A) 10/02/2020 09:40 AM    TRIGLYCERIDE 268 (H) 10/02/2020 09:40 AM       Lab Results   Component Value Date/Time    SODIUM 141 10/25/2021 02:14 PM    POTASSIUM 3.8 10/25/2021 02:14 PM    CHLORIDE 103 10/25/2021 02:14 PM    CO2 26 10/25/2021 02:14 PM    GLUCOSE 203 (H) 10/25/2021 02:14 PM    BUN 10 10/25/2021 02:14 PM    CREATININE 0.93 10/25/2021 02:14 PM    CREATININE 0.8 09/10/2007 10:15 AM     Lab Results   Component Value Date/Time    ALKPHOSPHAT 104 (H) 10/25/2021 02:14 PM    ASTSGOT 13 10/25/2021 02:14 PM    ALTSGPT 15 10/25/2021 02:14 PM    TBILIRUBIN 1.1 10/25/2021 02:14 PM        Last Microalbuminuria -    Ref. Range 4/2/2021 15:26   Micro Alb Creat Ratio Latest Ref Range: 0 - 30 mg/g 117 (H)   Creatinine, Urine Latest Units: mg/dL 234.62   Microalbumin, Urine Random Latest Units: mg/dL 27.5       Past Medical History:  Patient Active Problem List    Diagnosis Date Noted   • Dural tear 04/06/2021   • H/O cervical spine surgery 01/15/2021   • Memory problem 10/09/2020   • History of colon polyps 11/13/2019   • Flank pain 09/12/2019   • Left lower quadrant pain 08/15/2019   • Dyslipidemia 07/26/2018   • Morbid obesity with BMI of 40.0-44.9, adult (HCC) 05/10/2018   • H/O colonoscopy with polypectomy 08/17/2017   • Chronic use of opiate drug for therapeutic purpose 08/11/2016   • Lumbar stenosis with neurogenic claudication 12/31/2015   • Anemia of unknown etiology 05/21/2015   • Sinusitis 04/21/2015   • Body aches 11/19/2014   • Nephrolithiasis 07/30/2014   • BPH (benign prostatic hyperplasia)  12/20/2013   • Degeneration of lumbar or lumbosacral intervertebral disc 12/30/2012   • Major depression 12/02/2012   • Obstructive sleep apnea 12/02/2012   • Iron deficiency anemia 12/02/2012   • Degeneration of cervical intervertebral disc 11/30/2012   • Chronic pain of left knee 09/18/2012   • Vitamin D deficiency disease 01/12/2012   • Diabetes mellitus type 2 in obese (HCC) 12/21/2011   • HTN (hypertension) 12/21/2011   • Insomnia 12/21/2011   • GERD (gastroesophageal reflux disease) 12/21/2011   • Chronic back pain 12/21/2011       Past Surgical History:  Past Surgical History:   Procedure Laterality Date   • COLONOSCOPY  11/13/2019    Procedure: COLONOSCOPY;  Surgeon: Buddy Diaz M.D.;  Location: [facility];  Service: Gastroenterology   • LUMBAR FUSION POSTERIOR  12/30/2012    Performed by Benjamin Kumar M.D. at [facility]   • CERVICAL DISK AND FUSION ANTERIOR  11/30/2012    Performed by Benjamin Kumar M.D. at [facility]   • HARDWARE REMOVAL ORTHO  11/30/2012    Performed by Benjamin Kumar M.D. at [facility]   • KNEE ARTHROSCOPY  9/18/2012    Performed by Servando Francis M.D. at [facility]   • MEDIAL MENISCECTOMY  9/18/2012    Performed by Servando Francis M.D. at [facility]   • DEBRIDEMENT  9/18/2012    Performed by Servando Francis M.D. at [facility]   • LUMBAR FUSION POSTERIOR  6/18/2010    Performed by BENJAMIN KUMAR at [facility]   • LUMBAR DECOMPRESSION  6/18/2010    Performed by BENJAMIN KUMAR at [facility]   • KNEE ARTHROSCOPY  10/20/2009    Performed by SERVANDO FRANCIS at [facility]   • MEDIAL MENISCECTOMY  10/20/2009    Performed by SERVANDO FRANCIS at [facility]   • CERVICAL FUSION POSTERIOR  9/2007    c -3-4-5    • CHOLECYSTECTOMY  1984    open   • FOOT SURGERY      left  Farrar's neuroma and heel spur        Allergies:  Penicillins, Phenergan [promethazine hcl], and Influenza virus vacc    Social History:  Social History     Socioeconomic History   • Marital status:      Spouse name: Not on file   • Number of children: Not on file   • Years of education: Not on file   • Highest education level: Not on file   Occupational History   • Not on file   Tobacco Use   • Smoking status: Never Smoker   • Smokeless tobacco: Never Used   Vaping Use   • Vaping Use: Never used   Substance and Sexual Activity   • Alcohol use: No     Alcohol/week: 0.0 oz   • Drug use: No   • Sexual activity: Yes     Partners: Female   Other Topics Concern   •  Service No   • Blood Transfusions Yes   • Caffeine Concern No   • Occupational Exposure No   • Hobby Hazards No   • Sleep Concern Yes   • Stress Concern Yes   • Weight Concern Yes   • Special Diet No   • Back Care No   • Exercise No   • Bike Helmet No   • Seat Belt Yes   • Self-Exams Yes   Social History Narrative   • Not on file     Social Determinants of Health     Financial Resource Strain:    • Difficulty of Paying Living Expenses: Not on file   Food Insecurity:    • Worried About Running Out of Food in the Last Year: Not on file   • Ran Out of Food in the Last Year: Not on file   Transportation Needs:    • Lack of Transportation (Medical): Not on file   • Lack of Transportation (Non-Medical): Not on file   Physical Activity:    • Days of Exercise per Week: Not on file   • Minutes of Exercise per Session: Not on file   Stress:    • Feeling of Stress : Not on file   Social Connections:    • Frequency of Communication with Friends and Family: Not on file   • Frequency of Social Gatherings with Friends and Family: Not on file   • Attends Gnosticist Services: Not on file   • Active Member of Clubs or Organizations: Not on file   • Attends Club or Organization Meetings: Not on file   • Marital Status: Not on file   Intimate Partner Violence:    • Fear of Current or Ex-Partner:  Not on file   • Emotionally Abused: Not on file   • Physically Abused: Not on file   • Sexually Abused: Not on file   Housing Stability:    • Unable to Pay for Housing in the Last Year: Not on file   • Number of Places Lived in the Last Year: Not on file   • Unstable Housing in the Last Year: Not on file       Family History:  Family History   Problem Relation Age of Onset   • Other Mother         osteopenia   • Hypertension Mother    • Psychiatric Illness Mother    • Other Father         brain tumor   • Heart Attack Maternal Grandfather    • Sleep Apnea Neg Hx        Medications:    Current Outpatient Medications:   •  atorvastatin (LIPITOR) 20 MG Tab, TAKE 1 TABLET BY MOUTH ONCE DAILY TO  LOWER  YOUR  RISK  OF  HEART  ATTACKS  AND  STROKES, Disp: 90 Tablet, Rfl: 3  •  pioglitazone (ACTOS) 30 MG Tab, Take 1 tablet by mouth once daily, Disp: 90 Tablet, Rfl: 3  •  Dulaglutide (TRULICITY) 4.5 MG/0.5ML Solution Pen-injector, Inject 0.5 mL under the skin every 7 days., Disp: 2 mL, Rfl: 3  •  Multiple Vitamin (MULTIVITAMIN) Tab, Take 1 tablet by mouth every day., Disp: 30 tablet, Rfl: 03  •  Empagliflozin (JARDIANCE) 25 MG Tab, Take 25 mg by mouth every day at 6 PM., Disp: 90 tablet, Rfl: 1  •  metoprolol SR (TOPROL XL) 25 MG TABLET SR 24 HR, Take 1 tablet by mouth once daily, Disp: 90 tablet, Rfl: 3  •  Omega-3 Fatty Acids (FISH OIL PO), Take  by mouth every day., Disp: , Rfl:   •  omeprazole (PRILOSEC) 20 MG delayed-release capsule, TAKE ONE CAPSULE BY MOUTH ONCE DAILY, Disp: 90 Cap, Rfl: 3  •  albuterol (VENTOLIN OR PROVENTIL) 108 (90 BASE) MCG/ACT AERS inhalation aerosol, Inhale 2 Puffs by mouth every 6 hours as needed for Shortness of Breath., Disp: 8.5 g, Rfl: 3  •  Cholecalciferol (VITAMIN D) 2000 UNIT CAPS, Take  by mouth every day., Disp: , Rfl:   •  aspirin 81 MG tablet, Take 81 mg by mouth every day., Disp: , Rfl:   •  amitriptyline (ELAVIL) 50 MG Tab, TAKE 1 TABLET BY MOUTH AT BEDTIME, Disp: 90 Tablet, Rfl:  3  •  oxyCODONE-acetaminophen (PERCOCET) 7.5-325 MG per tablet, , Disp: , Rfl:   •  zolpidem (AMBIEN) 10 MG Tab, TAKE 1 TABLET BY MOUTH ONCE DAILY AT BEDTIME AS NEEDED FOR SLEEP, Disp: 90 Tablet, Rfl: 0  •  gabapentin (NEURONTIN) 300 MG Cap, TAKE 3 CAPSULES BY MOUTH THREE TIMES DAILY, Disp: 810 Capsule, Rfl: 3  •  ferrous sulfate 325 (65 Fe) MG EC tablet, Take 1 tablet by mouth every day., Disp: 90 tablet, Rfl: 3  •  ascorbic acid (VITAMIN C) 500 MG tablet, Take 1 tablet by mouth every day., Disp: 100 tablet, Rfl: 5  •  metformin (GLUCOPHAGE) 1000 MG tablet, Take 1 tablet by mouth 2 times a day with meals., Disp: 180 tablet, Rfl: 1  •  insulin NPH (NOVOLIN N RELION) 100 UNIT/ML Suspension, Inject 30-40 Units under the skin 2 times a day. (Patient taking differently: Inject 30-40 Units under the skin 2 times a day. Or as directed), Disp: 80 mL, Rfl: 1  •  Fe Bisgly-Succ-C-Thre-B12-FA (IRON 21/7 PO), Take  by mouth., Disp: , Rfl:   •  Ascorbic Acid (VITAMIN C) 1000 MG Tab, Take  by mouth., Disp: , Rfl:   •  traZODone (DESYREL) 100 MG Tab, TAKE 1 TO 2 TABLETS BY MOUTH ONCE DAILY AT BEDTIME AS NEEDED FOR SLEEP, Disp: 180 tablet, Rfl: 3  •  tamsulosin (FLOMAX) 0.4 MG capsule, Take 2 capsules by mouth once daily, Disp: 180 capsule, Rfl: 3  •  celecoxib (CELEBREX) 200 MG Cap, Take 1 capsule by mouth twice daily, Disp: 180 capsule, Rfl: 3  •  finasteride (PROSCAR) 5 MG Tab, Take 1 tablet by mouth once daily, Disp: 90 tablet, Rfl: 3  •  amLODIPine (NORVASC) 10 MG Tab, Take 1 tablet by mouth once daily, Disp: 90 tablet, Rfl: 3  •  losartan (COZAAR) 100 MG Tab, Take 1 tablet by mouth once daily, Disp: 90 tablet, Rfl: 3  •  oxycodone (OXY-IR) 15 MG immediate release tablet, , Disp: , Rfl:   •  sertraline (ZOLOFT) 50 MG Tab, Take 1 tablet by mouth once daily, Disp: 90 tablet, Rfl: 3  •  oxyCODONE-acetaminophen (PERCOCET-10)  MG Tab, TAKE 1 2 TO 1 (ONE HALF TO ONE) TABLET BY MOUTH EVERY 4 HOURS FOR 10 DAYS AS NEEDED FOR  PAIN (MODERATE 4 6), Disp: , Rfl:   •  FIBER PO, Take 2 Caps by mouth every day. Gummy, Disp: , Rfl:   •  Misc. Devices Misc, One touch ultra test strips. Patient has DM type 2 and tests twice per day, Disp: 100 Each, Rfl: 3  •  Misc. Devices Misc, One touch ultra lancets; patient has diabetes mellitus type 2 and test once per day, Disp: 100 Device, Rfl: 3  •  tizanidine (ZANAFLEX) 4 MG Tab, Take 1 Tab by mouth every 6 hours as needed (back pain). (Patient taking differently: Take 4 mg by mouth every day.), Disp: 270 Tab, Rfl: 3  •  Misc. Devices Misc, CPAP supplies; fax to [facility] [phone]., Disp: 100 Device, Rfl: 3  •  fluticasone (FLONASE) 50 MCG/ACT nasal spray, Spray 2 Sprays in nose every day. Each Nostril, Disp: 1 Bottle, Rfl: 3  •  Fexofenadine HCl (ALLEGRA PO), Take  by mouth as needed., Disp: , Rfl:   •  GLUCOSAMINE-CHONDROITIN-MSM PO, Take 2 Tabs by mouth every day., Disp: , Rfl:     Labs: Reviewed    Physical Examination:  Vital signs: There were no vitals taken for this visit. There is no height or weight on file to calculate BMI.    Assessment and Plan:    1. DM2  ·  self-reported fasting blood glucose near goal  · Occasional postprandial glucoses above 180  · Continue with plan listed below  · At subsequent appointments, if A1c is above 7, may need to use NPH insulin scheduled 7 units twice daily and titrate accordingly.    - Medication changes -   · Trulicity up to 4.5 mg once weekly  · using insulin NPH 7 units BID prn   · Continue metformin 1000 mg BID   · Continue Jardiance 25 mg once daily  · Continue Pioglitazone 30 mg once daily    Long-acting insulin:   Adjust dose according to FASTING BLOOD GLUCOSE target  mg/dL  (1) Increase the insulin dose every 3-4 days as needed.   (2) Increase by 2 units if FBG average concentration is 131-170 mg/dL.   (3) Increase by 4 units if FBG average concentration is 171-210 mg/dL.   (4) Increase by 6 units if FBG average concentration is 221-260 mg/dL.    (5) Increase by 8 units if FBG average concentration is greater than 261mg/dL and call us.   Consider cutting back by 1-2 units to previous dose if glucose concentration is below 80 mg/dL or symptoms of hypoglycemia.       - Lifestyle changes -   · Diet: Continue to maximize lean proteins and veggies - less pot pie/carb heavy pre-prepared meals. Continue to cut out/down on carbs. Avoid simple sugars.   · Exercise: Increase as tolerated    FU: 8 weeks     Lamonte Stone, PharmD  10/27/21    CC:   Ryann Street M.D.

## 2022-01-23 DIAGNOSIS — F51.01 PRIMARY INSOMNIA: ICD-10-CM

## 2022-01-24 NOTE — TELEPHONE ENCOUNTER
Received request via: Pharmacy    Was the patient seen in the last year in this department? Yes    Does the patient have an active prescription (recently filled or refills available) for medication(s) requested? No     Last Office Visit:12/02/2021  Last Labs:10/25/2021    Next appt: 02/11/2022

## 2022-01-25 RX ORDER — ZOLPIDEM TARTRATE 10 MG/1
TABLET ORAL
Qty: 90 TABLET | Refills: 0 | Status: SHIPPED | OUTPATIENT
Start: 2022-01-25 | End: 2022-04-25

## 2022-02-11 ENCOUNTER — DOCUMENTATION (OUTPATIENT)
Dept: MEDICAL GROUP | Facility: PHYSICIAN GROUP | Age: 62
End: 2022-02-11

## 2022-03-09 RX ORDER — ATORVASTATIN CALCIUM 20 MG/1
TABLET, FILM COATED ORAL
Qty: 90 TABLET | Refills: 3 | Status: SHIPPED | OUTPATIENT
Start: 2022-03-09

## 2022-03-14 DIAGNOSIS — E11.69 DIABETES MELLITUS TYPE 2 IN OBESE: ICD-10-CM

## 2022-03-14 DIAGNOSIS — E66.9 DIABETES MELLITUS TYPE 2 IN OBESE: ICD-10-CM

## 2022-03-14 NOTE — TELEPHONE ENCOUNTER
Received request via: Pharmacy    Was the patient seen in the last year in this department? Yes    Does the patient have an active prescription (recently filled or refills available) for medication(s) requested? No     Different pharmacy

## 2022-03-16 RX ORDER — PIOGLITAZONEHYDROCHLORIDE 30 MG/1
30 TABLET ORAL DAILY
Qty: 90 TABLET | Refills: 3 | Status: SHIPPED | OUTPATIENT
Start: 2022-03-16 | End: 2022-08-08 | Stop reason: SDUPTHER

## 2022-03-16 RX ORDER — LOSARTAN POTASSIUM 100 MG/1
100 TABLET ORAL DAILY
Qty: 90 TABLET | Refills: 3 | Status: SHIPPED | OUTPATIENT
Start: 2022-03-16 | End: 2022-05-26

## 2022-03-28 NOTE — TELEPHONE ENCOUNTER
Received request via: Pharmacy    Was the patient seen in the last year in this department? Yes    Does the patient have an active prescription (recently filled or refills available) for medication(s) requested? No     Last ov: 12/2/21

## 2022-05-26 DIAGNOSIS — N40.1 BENIGN NODULAR PROSTATIC HYPERPLASIA WITH LOWER URINARY TRACT SYMPTOMS: ICD-10-CM

## 2022-05-26 DIAGNOSIS — F51.01 PRIMARY INSOMNIA: ICD-10-CM

## 2022-05-26 DIAGNOSIS — M48.062 LUMBAR STENOSIS WITH NEUROGENIC CLAUDICATION: ICD-10-CM

## 2022-05-26 DIAGNOSIS — F33.1 MODERATE EPISODE OF RECURRENT MAJOR DEPRESSIVE DISORDER (HCC): ICD-10-CM

## 2022-05-26 RX ORDER — TRAZODONE HYDROCHLORIDE 100 MG/1
TABLET ORAL
Qty: 180 TABLET | Refills: 3 | Status: SHIPPED | OUTPATIENT
Start: 2022-05-26

## 2022-05-26 RX ORDER — LOSARTAN POTASSIUM 100 MG/1
TABLET ORAL
Qty: 90 TABLET | Refills: 3 | Status: SHIPPED | OUTPATIENT
Start: 2022-05-26 | End: 2023-03-09 | Stop reason: SDUPTHER

## 2022-05-26 RX ORDER — TAMSULOSIN HYDROCHLORIDE 0.4 MG/1
CAPSULE ORAL
Qty: 180 CAPSULE | Refills: 3 | Status: SHIPPED | OUTPATIENT
Start: 2022-05-26

## 2022-05-26 RX ORDER — CELECOXIB 200 MG/1
CAPSULE ORAL
Qty: 180 CAPSULE | Refills: 3 | Status: SHIPPED | OUTPATIENT
Start: 2022-05-26

## 2022-05-26 NOTE — TELEPHONE ENCOUNTER
Received request via: Pharmacy    Was the patient seen in the last year in this department? Yes    Does the patient have an active prescription (recently filled or refills available) for medication(s) requested? No     Last Office Visit:12/02/2021  Last Labs:10/25/2021

## 2022-06-15 NOTE — TELEPHONE ENCOUNTER
Has upcoming appt w/ PCP. Will send 3 months of fills to pharmacy.     Kobe Soler III 59 y.o. male is here today for Blood Pressure check.   History of HTN yes.    Review of patient's allergies indicates:   Allergen Reactions    Codeine Anaphylaxis    Penicillins Anaphylaxis    Cymbalta [duloxetine]      nitemares     Creatinine   Date Value Ref Range Status   02/11/2022 1.3 0.5 - 1.4 mg/dL Final     Sodium   Date Value Ref Range Status   02/11/2022 139 136 - 145 mmol/L Final     Potassium   Date Value Ref Range Status   02/11/2022 4.0 3.5 - 5.1 mmol/L Final   ]  Patient verifies taking blood pressure medications on a regular basis at the same time of the day.     Current Outpatient Medications:     amLODIPine (NORVASC) 2.5 MG tablet, Take 1 tablet (2.5 mg total) by mouth once daily., Disp: 90 tablet, Rfl: 0    atorvastatin (LIPITOR) 40 MG tablet, TAKE ONE TABLET (40 MG) BY MOUTH DAILY, Disp: 90 tablet, Rfl: 3    blood sugar diagnostic Strp, 1 strip by Misc.(Non-Drug; Combo Route) route 2 (two) times daily., Disp: 150 strip, Rfl: 4    co-enzyme Q-10 30 mg capsule, Take 30 mg by mouth once daily. , Disp: , Rfl:     cyclobenzaprine (FLEXERIL) 5 MG tablet, Take 5 mg by mouth every 4 (four) hours as needed., Disp: , Rfl:     fenofibrate 160 MG Tab, Take 1 tablet (160 mg total) by mouth once daily., Disp: 90 tablet, Rfl: 3    ibuprofen (ADVIL,MOTRIN) 800 MG tablet, TAKE ONE TABLET (800 MG) BY MOUTH THREE TIMES DAILY AS NEEDED; take with food, Disp: 60 tablet, Rfl: 1    lancets (ACCU-CHEK SOFTCLIX LANCETS) Misc, 1 Device by Misc.(Non-Drug; Combo Route) route 2 (two) times daily., Disp: 200 each, Rfl: 3    losartan-hydrochlorothiazide 100-25 mg (HYZAAR) 100-25 mg per tablet, TAKE ONE TABLET BY MOUTH ONCE DAILY, Disp: 90 tablet, Rfl: 3    metFORMIN (GLUCOPHAGE) 500 MG tablet, TAKE ONE TABLET BY MOUTH TWICE DAILY WITH MEALS., Disp: 180 tablet, Rfl: 0    nicotine (NICODERM CQ) 21 mg/24 hr, Place 1 patch onto the skin once daily., Disp: 28 patch, Rfl: 0    nicotine polacrilex  2 MG Lozg, Take 2 mg by mouth as needed (May take as needed 8-10 per day Q 1-3 hrs.)., Disp: , Rfl:     paroxetine (PAXIL) 40 MG tablet, TAKE ONE TABLET (40 MG) BY MOUTH EVERY MORNING, Disp: 90 tablet, Rfl: 0    PERCOCET  mg per tablet, Take 1 tablet by mouth 3 (three) times daily as needed., Disp: , Rfl:     sildenafiL (VIAGRA) 50 MG tablet, Take 1 tablet (50 mg total) by mouth daily as needed for Erectile Dysfunction., Disp: 10 tablet, Rfl: 2    traZODone (DESYREL) 150 MG tablet, Take 1 tablet (150 mg total) by mouth every evening., Disp: 90 tablet, Rfl: 0     Does patient have record of home blood pressure readings no , has bp monitor from [facility] and not sure if giving accurate readings which are much higher at home than when in clinic Readings have been Last dose of blood pressure medication was taken at around 7:00 a.m this morning.  Patient is asymptomatic.   .  Initial BP today was 116/64  Blood Pressure after 15 minutes was 112/64 Pulse: 70  7/14 returning for visit w/myself and digital HTN med representative Lev Tobin for information session and  Possible sign up for Digital Med Program.  12/9/22 artur Quiñones notified.

## 2022-07-05 DIAGNOSIS — I10 ESSENTIAL HYPERTENSION: ICD-10-CM

## 2022-07-05 DIAGNOSIS — N40.1 BENIGN NODULAR PROSTATIC HYPERPLASIA WITH LOWER URINARY TRACT SYMPTOMS: ICD-10-CM

## 2022-07-05 DIAGNOSIS — Z12.5 SCREENING FOR PROSTATE CANCER: ICD-10-CM

## 2022-07-06 RX ORDER — FINASTERIDE 5 MG/1
TABLET, FILM COATED ORAL
Qty: 90 TABLET | Refills: 3 | Status: SHIPPED | OUTPATIENT
Start: 2022-07-06

## 2022-07-06 RX ORDER — METOPROLOL SUCCINATE 25 MG/1
TABLET, EXTENDED RELEASE ORAL
Qty: 90 TABLET | Refills: 3 | Status: SHIPPED | OUTPATIENT
Start: 2022-07-06

## 2022-07-06 NOTE — TELEPHONE ENCOUNTER
[id number]  Last OV    Received request via: Pharmacy    Was the patient seen in the last year in this department? Yes    Does the patient have an active prescription (recently filled or refills available) for medication(s) requested? No

## 2022-08-08 DIAGNOSIS — E11.69 DIABETES MELLITUS TYPE 2 IN OBESE: ICD-10-CM

## 2022-08-08 DIAGNOSIS — E66.9 DIABETES MELLITUS TYPE 2 IN OBESE: ICD-10-CM

## 2022-08-09 RX ORDER — PIOGLITAZONEHYDROCHLORIDE 30 MG/1
30 TABLET ORAL DAILY
Qty: 90 TABLET | Refills: 3 | Status: SHIPPED | OUTPATIENT
Start: 2022-08-09

## 2023-03-02 NOTE — TELEPHONE ENCOUNTER
- STOPBANG low risk 2/8  - RCRI: 0/6 ~3.9% risk complication with >/=4 METs work capacity  -  medium risk procedure and low risk patient  - EKG March 2021 NSR, no ST segment elevation or depression. Labs: CBC, BMP, UA unremarkable     Patient medically optimized for surgery  
I have called and spoke with Nichol to inform her of this message   
Patients spouse called and stated they wee told to take iron but they wanted to know which strength and if they should take with vitamin c ?    Please advise     [phone] (home)     
Usual dose for iron supplement is 325 mg he can take one to two tabs daily, with food as it can cause nausea sometimes.   Can take with vit C 500mg  for improved absorption.   Dr. Street   
8

## 2023-03-09 RX ORDER — LOSARTAN POTASSIUM 100 MG/1
100 TABLET ORAL DAILY
Qty: 90 TABLET | Refills: 3 | Status: SHIPPED | OUTPATIENT
Start: 2023-03-09

## 2023-03-10 NOTE — TELEPHONE ENCOUNTER
Received request via: Pharmacy    Was the patient seen in the last year in this department? Yes    Does the patient have an active prescription (recently filled or refills available) for medication(s) requested? No    Does the patient have [facility] and need 100 day supply (blood pressure, diabetes and cholesterol meds only)? Patient does not have SCP    Last office visit:12/02/2021  Last Labs:10/25/2021

## 2023-03-14 RX ORDER — AMITRIPTYLINE HYDROCHLORIDE 50 MG/1
50 TABLET, FILM COATED ORAL
Qty: 90 TABLET | Refills: 3 | Status: SHIPPED | OUTPATIENT
Start: 2023-03-14

## 2023-03-14 NOTE — TELEPHONE ENCOUNTER
Received request via: Pharmacy    Was the patient seen in the last year in this department? Yes    Does the patient have an active prescription (recently filled or refills available) for medication(s) requested? No    Does the patient have [facility] and need 100 day supply (blood pressure, diabetes and cholesterol meds only)? Patient does not have SCP    Last Office Visit:12/02/2021  Last Labs:10/25/2021

## 2023-08-07 ENCOUNTER — APPOINTMENT (RX ONLY)
Dept: URBAN - NONMETROPOLITAN AREA CLINIC 15 | Facility: CLINIC | Age: 63
Setting detail: DERMATOLOGY
End: 2023-08-07

## 2023-08-07 DIAGNOSIS — D22 MELANOCYTIC NEVI: ICD-10-CM

## 2023-08-07 DIAGNOSIS — L81.4 OTHER MELANIN HYPERPIGMENTATION: ICD-10-CM

## 2023-08-07 DIAGNOSIS — L82.1 OTHER SEBORRHEIC KERATOSIS: ICD-10-CM

## 2023-08-07 DIAGNOSIS — D18.0 HEMANGIOMA: ICD-10-CM

## 2023-08-07 DIAGNOSIS — B07.8 OTHER VIRAL WARTS: ICD-10-CM

## 2023-08-07 DIAGNOSIS — L30.4 ERYTHEMA INTERTRIGO: ICD-10-CM

## 2023-08-07 PROBLEM — D22.61 MELANOCYTIC NEVI OF RIGHT UPPER LIMB, INCLUDING SHOULDER: Status: ACTIVE | Noted: 2023-08-07

## 2023-08-07 PROBLEM — D48.5 NEOPLASM OF UNCERTAIN BEHAVIOR OF SKIN: Status: ACTIVE | Noted: 2023-08-07

## 2023-08-07 PROBLEM — D22.62 MELANOCYTIC NEVI OF LEFT UPPER LIMB, INCLUDING SHOULDER: Status: ACTIVE | Noted: 2023-08-07

## 2023-08-07 PROBLEM — D22.71 MELANOCYTIC NEVI OF RIGHT LOWER LIMB, INCLUDING HIP: Status: ACTIVE | Noted: 2023-08-07

## 2023-08-07 PROBLEM — D18.01 HEMANGIOMA OF SKIN AND SUBCUTANEOUS TISSUE: Status: ACTIVE | Noted: 2023-08-07

## 2023-08-07 PROBLEM — D22.72 MELANOCYTIC NEVI OF LEFT LOWER LIMB, INCLUDING HIP: Status: ACTIVE | Noted: 2023-08-07

## 2023-08-07 PROBLEM — D22.5 MELANOCYTIC NEVI OF TRUNK: Status: ACTIVE | Noted: 2023-08-07

## 2023-08-07 PROCEDURE — ? ADDITIONAL NOTES

## 2023-08-07 PROCEDURE — 11306 SHAVE SKIN LESION 0.6-1.0 CM: CPT

## 2023-08-07 PROCEDURE — ? PRESCRIPTION

## 2023-08-07 PROCEDURE — ? SHAVE REMOVAL AND DESTRUCTION

## 2023-08-07 PROCEDURE — ? COUNSELING

## 2023-08-07 PROCEDURE — 99203 OFFICE O/P NEW LOW 30 MIN: CPT | Mod: 25

## 2023-08-07 RX ORDER — KETOCONAZOLE 20 MG/G
CREAM TOPICAL BID
Qty: 60 | Refills: 6 | Status: ERX | COMMUNITY
Start: 2023-08-07

## 2023-08-07 RX ADMIN — KETOCONAZOLE 1: 20 CREAM TOPICAL at 00:00

## 2023-08-07 ASSESSMENT — LOCATION ZONE DERM
LOCATION ZONE: ARM
LOCATION ZONE: FINGER
LOCATION ZONE: TRUNK
LOCATION ZONE: LEG
LOCATION ZONE: FACE

## 2023-08-07 ASSESSMENT — LOCATION DETAILED DESCRIPTION DERM
LOCATION DETAILED: LEFT LATERAL PROXIMAL PRETIBIAL REGION
LOCATION DETAILED: RIGHT ANTECUBITAL SKIN
LOCATION DETAILED: RIGHT INFERIOR MEDIAL FOREHEAD
LOCATION DETAILED: LEFT VENTRAL LATERAL PROXIMAL FOREARM
LOCATION DETAILED: LEFT ANTERIOR DISTAL UPPER ARM
LOCATION DETAILED: LEFT POPLITEAL SKIN
LOCATION DETAILED: INFERIOR THORACIC SPINE
LOCATION DETAILED: XIPHOID
LOCATION DETAILED: LEFT PROXIMAL PRETIBIAL REGION
LOCATION DETAILED: RIGHT INFERIOR CENTRAL MALAR CHEEK
LOCATION DETAILED: LEFT MID DORSAL INDEX FINGER
LOCATION DETAILED: LEFT ANTERIOR PROXIMAL UPPER ARM
LOCATION DETAILED: LEFT INFERIOR LATERAL MIDBACK
LOCATION DETAILED: RIGHT DISTAL POSTERIOR THIGH
LOCATION DETAILED: RIGHT MEDIAL SUPERIOR CHEST
LOCATION DETAILED: LEFT VENTRAL PROXIMAL FOREARM
LOCATION DETAILED: MIDDLE STERNUM
LOCATION DETAILED: RIGHT VENTRAL PROXIMAL FOREARM
LOCATION DETAILED: RIGHT ANTERIOR PROXIMAL UPPER ARM
LOCATION DETAILED: SUBXIPHOID
LOCATION DETAILED: LEFT DISTAL POSTERIOR THIGH
LOCATION DETAILED: RIGHT POPLITEAL SKIN
LOCATION DETAILED: RIGHT ANTERIOR DISTAL UPPER ARM
LOCATION DETAILED: LEFT SUPERIOR MEDIAL MIDBACK
LOCATION DETAILED: LEFT INFERIOR MEDIAL UPPER BACK
LOCATION DETAILED: RIGHT PROXIMAL PRETIBIAL REGION

## 2023-08-07 ASSESSMENT — LOCATION SIMPLE DESCRIPTION DERM
LOCATION SIMPLE: LEFT INDEX FINGER
LOCATION SIMPLE: RIGHT UPPER ARM
LOCATION SIMPLE: LEFT UPPER ARM
LOCATION SIMPLE: RIGHT FOREHEAD
LOCATION SIMPLE: RIGHT POSTERIOR THIGH
LOCATION SIMPLE: CHEST
LOCATION SIMPLE: RIGHT CHEEK
LOCATION SIMPLE: LEFT POSTERIOR THIGH
LOCATION SIMPLE: ABDOMEN
LOCATION SIMPLE: RIGHT PRETIBIAL REGION
LOCATION SIMPLE: LEFT FOREARM
LOCATION SIMPLE: UPPER BACK
LOCATION SIMPLE: LEFT UPPER BACK
LOCATION SIMPLE: LEFT POPLITEAL SKIN
LOCATION SIMPLE: LEFT LOWER BACK
LOCATION SIMPLE: RIGHT FOREARM
LOCATION SIMPLE: RIGHT POPLITEAL SKIN
LOCATION SIMPLE: LEFT PRETIBIAL REGION

## 2023-08-07 NOTE — PROCEDURE: SHAVE REMOVAL AND DESTRUCTION
Detail Level: Detailed
Size Of Lesion In Cm (Required For Billing): 0.7
Size Of Lesion After Curettage (Will Not Effect Billing): 0
Anesthesia Type: 1% lidocaine with epinephrine
Anesthesia Volume In Cc: 0.5
Hemostasis: Drysol
Cautery Type: electrodesiccation
Number Of Curettages: 3
Wound Care: Petrolatum
Dressing: dry sterile dressing
Lab: [id number]
Lab Facility: 
Render Path Notes In Note?: No
Consent: Written consent was obtained and risks were reviewed including but not limited to scarring, infection, bleeding, scabbing, incomplete removal, nerve damage and allergy to anesthesia.
Post-Care Instructions: I reviewed with the patient in detail post-care instructions. Patient is to keep the biopsy site dry overnight, and then apply bacitracin twice daily until healed. Patient may apply hydrogen peroxide soaks to remove any crusting.
Notification Instructions: Patient will be notified of biopsy results. However, patient instructed to call the office if not contacted within 2 weeks.
Billing Type: Third-Party Bill

## 2023-08-07 NOTE — PROCEDURE: ADDITIONAL NOTES
Detail Level: Detailed
Additional Notes: Start head and shoulders once daily in the shower \\nStart ketoconazole once daily at bed time
Render Risk Assessment In Note?: no

## 2025-05-01 NOTE — ASSESSMENT & PLAN NOTE
BP well controlled on losartan 100 mg amlodipine 10 mg and metoprolol 25 mg   well developed, well nourished , in no acute distress ,

## (undated) DEVICE — GLOVE, LITE (PAIR)

## (undated) DEVICE — GOWN SURGEONS X-LARGE - DISP. (30/CA)

## (undated) DEVICE — NEPTUNE 4 PORT MANIFOLD - (20/PK)

## (undated) DEVICE — MASK ANESTHESIA ADULT  - (100/CA)

## (undated) DEVICE — PAD PREP 24 X 48 CUFFED - (100/CA)

## (undated) DEVICE — KIT  I.V. START (100EA/CA)

## (undated) DEVICE — CANISTER SUCTION RIGID RED 1500CC (40EA/CA)

## (undated) DEVICE — SET EXTENSION WITH 2 PORTS (48EA/CA) ***PART #2C8610 IS A SUBSTITUTE*****

## (undated) DEVICE — KIT ANESTHESIA W/CIRCUIT & 3/LT BAG W/FILTER (20EA/CA)

## (undated) DEVICE — SYRINGE SAFETY 5 ML 18 GA X 1-1/2 BLUNT LL (100/BX 4BX/CA)

## (undated) DEVICE — LACTATED RINGERS INJ 1000 ML - (14EA/CA 60CA/PF)

## (undated) DEVICE — CATHETER IV SAFETY 20 GA X 1-1/4 (50/BX)

## (undated) DEVICE — SYRINGE SAFETY 3 ML 18 GA X 1 1/2 BLUNT LL (100/BX 8BX/CA)

## (undated) DEVICE — SYRINGE DISP. 50CC LS - (40/BX)

## (undated) DEVICE — CANNULA W/ SUPPLY TUBING O2 - (50/CA)

## (undated) DEVICE — SENSOR SPO2 ADULT LNCS ADTX (20/BX) ORDER ITEM #19593

## (undated) DEVICE — ELECTRODE 850 FOAM ADHESIVE - HYDROGEL RADIOTRNSPRNT (50/PK)

## (undated) DEVICE — BASIN EMESIS DISP. - (250/CA)

## (undated) DEVICE — TUBE CONNECTING SUCTION - CLEAR PLASTIC STERILE 72 IN (50EA/CA)

## (undated) DEVICE — TUBING CLEARLINK DUO-VENT - C-FLO (48EA/CA)

## (undated) DEVICE — SPONGE GAUZE NON-STERILE 4X4 - (2000/CA 10PK/CA)